# Patient Record
Sex: FEMALE | Race: WHITE | NOT HISPANIC OR LATINO | Employment: OTHER | URBAN - METROPOLITAN AREA
[De-identification: names, ages, dates, MRNs, and addresses within clinical notes are randomized per-mention and may not be internally consistent; named-entity substitution may affect disease eponyms.]

---

## 2018-08-20 ENCOUNTER — HOSPITAL ENCOUNTER (EMERGENCY)
Facility: HOSPITAL | Age: 78
Discharge: HOME | End: 2018-08-21
Attending: EMERGENCY MEDICINE | Admitting: EMERGENCY MEDICINE

## 2018-08-20 ENCOUNTER — APPOINTMENT (OUTPATIENT)
Dept: GENERAL RADIOLOGY | Facility: HOSPITAL | Age: 78
End: 2018-08-20

## 2018-08-20 DIAGNOSIS — R07.89 ATYPICAL CHEST PAIN: Primary | ICD-10-CM

## 2018-08-20 PROCEDURE — 93005 ELECTROCARDIOGRAM TRACING: CPT | Performed by: EMERGENCY MEDICINE

## 2018-08-20 PROCEDURE — 93005 ELECTROCARDIOGRAM TRACING: CPT

## 2018-08-20 PROCEDURE — 93010 ELECTROCARDIOGRAM REPORT: CPT | Performed by: INTERNAL MEDICINE

## 2018-08-20 PROCEDURE — 99284 EMERGENCY DEPT VISIT MOD MDM: CPT

## 2018-08-20 RX ORDER — SODIUM CHLORIDE 0.9 % (FLUSH) 0.9 %
10 SYRINGE (ML) INJECTION AS NEEDED
Status: DISCONTINUED | OUTPATIENT
Start: 2018-08-20 | End: 2018-08-21 | Stop reason: HOSPADM

## 2018-08-21 ENCOUNTER — APPOINTMENT (OUTPATIENT)
Dept: GENERAL RADIOLOGY | Facility: HOSPITAL | Age: 78
End: 2018-08-21

## 2018-08-21 VITALS
HEIGHT: 63 IN | HEART RATE: 65 BPM | BODY MASS INDEX: 36.41 KG/M2 | OXYGEN SATURATION: 95 % | SYSTOLIC BLOOD PRESSURE: 162 MMHG | TEMPERATURE: 98.4 F | WEIGHT: 205.5 LBS | DIASTOLIC BLOOD PRESSURE: 78 MMHG | RESPIRATION RATE: 18 BRPM

## 2018-08-21 LAB
ALBUMIN SERPL-MCNC: 5.1 G/DL (ref 3.5–5.2)
ALBUMIN/GLOB SERPL: 1.5 G/DL
ALP SERPL-CCNC: 106 U/L (ref 39–117)
ALT SERPL W P-5'-P-CCNC: 20 U/L (ref 1–33)
ANION GAP SERPL CALCULATED.3IONS-SCNC: 17.9 MMOL/L
AST SERPL-CCNC: 16 U/L (ref 1–32)
BASOPHILS # BLD AUTO: 0.01 10*3/MM3 (ref 0–0.2)
BASOPHILS NFR BLD AUTO: 0.1 % (ref 0–1.5)
BILIRUB SERPL-MCNC: 0.4 MG/DL (ref 0.1–1.2)
BUN BLD-MCNC: 23 MG/DL (ref 8–23)
BUN/CREAT SERPL: 16.1 (ref 7–25)
CALCIUM SPEC-SCNC: 10.2 MG/DL (ref 8.6–10.5)
CHLORIDE SERPL-SCNC: 104 MMOL/L (ref 98–107)
CO2 SERPL-SCNC: 20.1 MMOL/L (ref 22–29)
CREAT BLD-MCNC: 1.43 MG/DL (ref 0.57–1)
DEPRECATED RDW RBC AUTO: 46.9 FL (ref 37–54)
EOSINOPHIL # BLD AUTO: 0.1 10*3/MM3 (ref 0–0.7)
EOSINOPHIL NFR BLD AUTO: 1.1 % (ref 0.3–6.2)
ERYTHROCYTE [DISTWIDTH] IN BLOOD BY AUTOMATED COUNT: 13.8 % (ref 11.7–13)
GFR SERPL CREATININE-BSD FRML MDRD: 35 ML/MIN/1.73
GLOBULIN UR ELPH-MCNC: 3.3 GM/DL
GLUCOSE BLD-MCNC: 95 MG/DL (ref 65–99)
HCT VFR BLD AUTO: 40.1 % (ref 35.6–45.5)
HGB BLD-MCNC: 12.3 G/DL (ref 11.9–15.5)
IMM GRANULOCYTES # BLD: 0 10*3/MM3 (ref 0–0.03)
IMM GRANULOCYTES NFR BLD: 0 % (ref 0–0.5)
LYMPHOCYTES # BLD AUTO: 2.28 10*3/MM3 (ref 0.9–4.8)
LYMPHOCYTES NFR BLD AUTO: 24.9 % (ref 19.6–45.3)
MCH RBC QN AUTO: 28.7 PG (ref 26.9–32)
MCHC RBC AUTO-ENTMCNC: 30.7 G/DL (ref 32.4–36.3)
MCV RBC AUTO: 93.5 FL (ref 80.5–98.2)
MONOCYTES # BLD AUTO: 0.94 10*3/MM3 (ref 0.2–1.2)
MONOCYTES NFR BLD AUTO: 10.3 % (ref 5–12)
NEUTROPHILS # BLD AUTO: 5.84 10*3/MM3 (ref 1.9–8.1)
NEUTROPHILS NFR BLD AUTO: 63.6 % (ref 42.7–76)
PLATELET # BLD AUTO: 183 10*3/MM3 (ref 140–500)
PMV BLD AUTO: 11.7 FL (ref 6–12)
POTASSIUM BLD-SCNC: 4.7 MMOL/L (ref 3.5–5.2)
PROT SERPL-MCNC: 8.4 G/DL (ref 6–8.5)
RBC # BLD AUTO: 4.29 10*6/MM3 (ref 3.9–5.2)
SODIUM BLD-SCNC: 142 MMOL/L (ref 136–145)
TROPONIN T SERPL-MCNC: <0.01 NG/ML (ref 0–0.03)
WBC NRBC COR # BLD: 9.17 10*3/MM3 (ref 4.5–10.7)

## 2018-08-21 PROCEDURE — 85025 COMPLETE CBC W/AUTO DIFF WBC: CPT | Performed by: EMERGENCY MEDICINE

## 2018-08-21 PROCEDURE — 84484 ASSAY OF TROPONIN QUANT: CPT | Performed by: EMERGENCY MEDICINE

## 2018-08-21 PROCEDURE — 80053 COMPREHEN METABOLIC PANEL: CPT | Performed by: EMERGENCY MEDICINE

## 2018-08-21 PROCEDURE — 71046 X-RAY EXAM CHEST 2 VIEWS: CPT

## 2018-08-21 RX ORDER — ATENOLOL 100 MG/1
100 TABLET ORAL 2 TIMES DAILY
COMMUNITY

## 2018-08-21 RX ORDER — FERROUS SULFATE 325(65) MG
325 TABLET ORAL
COMMUNITY

## 2018-08-21 RX ORDER — RIVASTIGMINE TARTRATE 1.5 MG/1
1.5 CAPSULE ORAL 2 TIMES DAILY
COMMUNITY

## 2018-08-21 RX ORDER — DESLORATADINE 5 MG/1
5 TABLET, ORALLY DISINTEGRATING ORAL DAILY
COMMUNITY

## 2018-08-21 RX ORDER — AMLODIPINE BESYLATE 10 MG/1
10 TABLET ORAL DAILY
COMMUNITY

## 2018-08-21 RX ORDER — CALCIUM POLYCARBOPHIL 625 MG 625 MG/1
625 TABLET ORAL DAILY
COMMUNITY

## 2018-08-21 RX ORDER — HYDRALAZINE HYDROCHLORIDE 50 MG/1
50 TABLET, FILM COATED ORAL 2 TIMES DAILY
COMMUNITY

## 2018-08-21 RX ORDER — TRAMADOL HYDROCHLORIDE 50 MG/1
50 TABLET ORAL 2 TIMES DAILY PRN
COMMUNITY

## 2018-08-21 RX ORDER — ASPIRIN 81 MG/1
81 TABLET, CHEWABLE ORAL DAILY
COMMUNITY

## 2018-08-21 RX ORDER — TEMAZEPAM 30 MG/1
30 CAPSULE ORAL NIGHTLY PRN
COMMUNITY

## 2018-08-21 RX ORDER — MELATONIN
2000 DAILY
COMMUNITY

## 2018-08-21 NOTE — ED NOTES
"Pt report left side of chest with \"tingling down left side of arm\" that started yesterday. Pt contacted PCP MD Juancarlos and was recommended to be seen in ER.     Pt denies cardiac hx.      Mandy Herr, RN  08/20/18 3206    "

## 2018-08-21 NOTE — ED PROVIDER NOTES
EMERGENCY DEPARTMENT ENCOUNTER    CHIEF COMPLAINT  Chief Complaint: Chest pain  History given by: patient   History limited by: n/a  Room Number: 11/11  PMD: Rowan Bauer MD      HPI:  Pt is a 78 y.o. female who presents complaining of left lateral chest pain that has been intermittent since yesterday. Pt states that the episodes last about 1 minute each. Pt also reports an intermittent tingling sensation in the LUE. Pt states that the CP and the tingling in the LUE occur independently of each other. She denies any aggravating or alleviating factors. She also reports 2 episodes of dizziness today. She denies associated nausea, vomiting, SOA, or diaphoresis.     Duration:  2 day  Onset: gradual  Timing: intermittent- one minute episodes   Location: left lateral chest   Radiation: none  Quality: pain  Intensity/Severity: moderate  Progression: unchanged   Associated Symptoms: LUE tingling, dizziness  Aggravating Factors: none  Alleviating Factors: none  Previous Episodes: none    PAST MEDICAL HISTORY  Active Ambulatory Problems     Diagnosis Date Noted   • No Active Ambulatory Problems     Resolved Ambulatory Problems     Diagnosis Date Noted   • No Resolved Ambulatory Problems     Past Medical History:   Diagnosis Date   • Hypertension    • Kidney stone        PAST SURGICAL HISTORY  Past Surgical History:   Procedure Laterality Date   • ABDOMINAL SURGERY      polyp removal   • CHOLECYSTECTOMY     • HYSTERECTOMY     • TONSILLECTOMY         FAMILY HISTORY  History reviewed. No pertinent family history.    SOCIAL HISTORY  Social History     Social History   • Marital status:      Spouse name: N/A   • Number of children: N/A   • Years of education: N/A     Occupational History   • Not on file.     Social History Main Topics   • Smoking status: Never Smoker   • Smokeless tobacco: Never Used   • Alcohol use Yes      Comment: very rarely   • Drug use: No   • Sexual activity: Not on file     Other Topics  Concern   • Not on file     Social History Narrative   • No narrative on file       ALLERGIES  Patient has no known allergies.    REVIEW OF SYSTEMS  Review of Systems   Constitutional: Negative for fever.   HENT: Negative for sore throat.    Eyes: Negative.    Respiratory: Negative for cough and shortness of breath.    Cardiovascular: Positive for chest pain (left lateral).   Gastrointestinal: Negative for abdominal pain, diarrhea and vomiting.   Genitourinary: Negative for dysuria.   Musculoskeletal: Negative for neck pain.   Skin: Negative for rash.   Allergic/Immunologic: Negative.    Neurological: Positive for dizziness and numbness (LUE tingling). Negative for weakness and headaches.   Hematological: Negative.    Psychiatric/Behavioral: Negative.    All other systems reviewed and are negative.      PHYSICAL EXAM  ED Triage Vitals [08/20/18 2252]   Temp Heart Rate Resp BP SpO2   98.4 °F (36.9 °C) 68 18 -- 98 %      Temp src Heart Rate Source Patient Position BP Location FiO2 (%)   Tympanic -- -- -- --       Physical Exam   Constitutional: She is oriented to person, place, and time. No distress.   HENT:   Head: Normocephalic and atraumatic.   Eyes: Pupils are equal, round, and reactive to light. EOM are normal.   Neck: Normal range of motion. Neck supple.   Cardiovascular: Normal rate, regular rhythm and normal heart sounds.    Pulmonary/Chest: Effort normal and breath sounds normal. No respiratory distress.   Abdominal: Soft. There is no tenderness. There is no rebound and no guarding.   Musculoskeletal: Normal range of motion. She exhibits no edema.   Neurological: She is alert and oriented to person, place, and time. She has normal sensation and normal strength.   Skin: Skin is warm and dry. No rash noted.   Psychiatric: Mood and affect normal.   Nursing note and vitals reviewed.      LAB RESULTS  Lab Results (last 24 hours)     Procedure Component Value Units Date/Time    CBC & Differential [[id number]]  Collected:  08/21/18 0017    Specimen:  Blood Updated:  08/21/18 0030    Narrative:       The following orders were created for panel order CBC & Differential.  Procedure                               Abnormality         Status                     ---------                               -----------         ------                     CBC Auto Differential[[id number]]        Abnormal            Final result                 Please view results for these tests on the individual orders.    Comprehensive Metabolic Panel [[id number]]  (Abnormal) Collected:  08/21/18 0017    Specimen:  Blood Updated:  08/21/18 0043     Glucose 95 mg/dL      BUN 23 mg/dL      Creatinine 1.43 (H) mg/dL      Sodium 142 mmol/L      Potassium 4.7 mmol/L      Chloride 104 mmol/L      CO2 20.1 (L) mmol/L      Calcium 10.2 mg/dL      Total Protein 8.4 g/dL      Albumin 5.10 g/dL      ALT (SGPT) 20 U/L      AST (SGOT) 16 U/L      Alkaline Phosphatase 106 U/L      Total Bilirubin 0.4 mg/dL      eGFR Non African Amer 35 (L) mL/min/1.73      Globulin 3.3 gm/dL      A/G Ratio 1.5 g/dL      BUN/Creatinine Ratio 16.1     Anion Gap 17.9 mmol/L     Narrative:       The MDRD GFR formula is only valid for adults with stable renal function between ages 18 and 70.    Troponin [[id number]]  (Normal) Collected:  08/21/18 0017    Specimen:  Blood Updated:  08/21/18 0043     Troponin T <0.010 ng/mL     Narrative:       Troponin T Reference Ranges:  Less than 0.03 ng/mL:    Negative for AMI  0.03 to 0.09 ng/mL:      Indeterminant for AMI  Greater than 0.09 ng/mL: Positive for AMI    CBC Auto Differential [[id number]]  (Abnormal) Collected:  08/21/18 0017    Specimen:  Blood Updated:  08/21/18 0030     WBC 9.17 10*3/mm3      RBC 4.29 10*6/mm3      Hemoglobin 12.3 g/dL      Hematocrit 40.1 %      MCV 93.5 fL      MCH 28.7 pg      MCHC 30.7 (L) g/dL      RDW 13.8 (H) %      RDW-SD 46.9 fl      MPV 11.7 fL      Platelets 183 10*3/mm3      Neutrophil % 63.6 %      Lymphocyte  % 24.9 %      Monocyte % 10.3 %      Eosinophil % 1.1 %      Basophil % 0.1 %      Immature Grans % 0.0 %      Neutrophils, Absolute 5.84 10*3/mm3      Lymphocytes, Absolute 2.28 10*3/mm3      Monocytes, Absolute 0.94 10*3/mm3      Eosinophils, Absolute 0.10 10*3/mm3      Basophils, Absolute 0.01 10*3/mm3      Immature Grans, Absolute 0.00 10*3/mm3           I ordered the above labs and reviewed the results    RADIOLOGY  XR Chest 2 View   Final Result   Minimal left lung base atelectasis       This report was finalized on 8/21/2018 12:27 AM by James Cooper M.D.               I ordered the above noted radiological studies. Interpreted by radiologist. Reviewed by me in PACS.       PROCEDURES  Procedures    EKG           EKG time: 22:58  Rhythm/Rate: NSR 66  P waves and IA: nml  QRS, axis: nml   ST and T waves: nml     Interpreted Contemporaneously by me, independently viewed  No prior      PROGRESS AND CONSULTS        23;38  CXR, EKG, CMP, CBC, and troponin ordered.    23:47  BP-   HR- 68 Temp- 98.4 °F (36.9 °C) (Tympanic) O2 sat- 98%  Advised pt of the plan for labs, CXR, and EKG. Pt understands and agrees with the plan, all questions answered.    01:35  BP- 162/78 HR- 65 Temp- 98.4 °F (36.9 °C) (Tympanic) O2 sat- 95%  Rechecked the patient who is in NAD and is resting comfortably. Advised pt that the workup in the ED shows NAD, including a negative CXR, EKG, and troponin. Informed her of the plan for discharge and pt will need to f/u with her PMD. Pt understands and agrees with the plan, all questions answered.    MEDICAL DECISION MAKING  Results were reviewed/discussed with the patient and they were also made aware of online access. Pt also made aware that some labs, such as cultures, will not be resulted during ER visit and follow up with PMD is necessary.     MDM  Number of Diagnoses or Management Options     Amount and/or Complexity of Data Reviewed  Clinical lab tests: ordered and reviewed (Troponin is  <0.010)  Tests in the radiology section of CPT®: ordered and reviewed (CXR shows left lung base atelectasis )  Tests in the medicine section of CPT®: ordered and reviewed (See EKG procedure note. )  Decide to obtain previous medical records or to obtain history from someone other than the patient: yes  Review and summarize past medical records: yes (No prior ED visits. )    Patient Progress  Patient progress: stable         DIAGNOSIS  Final diagnoses:   Atypical chest pain       DISPOSITION  DISCHARGE    Patient discharged in stable condition.    Reviewed implications of results, diagnosis, meds, responsibility to follow up, warning signs and symptoms of possible worsening, potential complications and reasons to return to ER.    Patient/Family voiced understanding of above instructions.    Discussed plan for discharge, as there is no emergent indication for admission. Patient referred to primary care provider for BP management due to today's BP. Pt/family is agreeable and understands need for follow up and repeat testing.  Pt is aware that discharge does not mean that nothing is wrong but it indicates no emergency is present that requires admission and they must continue care with follow-up as given below or physician of their choice.     FOLLOW-UP  Rowan Bauer MD  [address] [place] [address]  [phone]    Schedule an appointment as soon as possible for a visit            Medication List      No changes were made to your prescriptions during this visit.       Latest Documented Vital Signs:  As of 6:07 AM  BP- 162/78 HR- 65 Temp- 98.4 °F (36.9 °C) (Tympanic) O2 sat- 95%    --  Documentation assistance provided by claudio Avila for Dr Shearer.  Information recorded by the scrjen was done at my direction and has been verified and validated by me.        Ghazal Avila  08/21/18 0138       Sudeep Shearer MD  08/21/18 7634

## 2019-03-02 ENCOUNTER — HOSPITAL ENCOUNTER (EMERGENCY)
Facility: HOSPITAL | Age: 79
Discharge: HOME | End: 2019-03-02
Attending: EMERGENCY MEDICINE | Admitting: EMERGENCY MEDICINE

## 2019-03-02 ENCOUNTER — APPOINTMENT (OUTPATIENT)
Dept: GENERAL RADIOLOGY | Facility: HOSPITAL | Age: 79
End: 2019-03-02

## 2019-03-02 VITALS
OXYGEN SATURATION: 99 % | HEART RATE: 70 BPM | BODY MASS INDEX: 37.21 KG/M2 | WEIGHT: 210 LBS | RESPIRATION RATE: 16 BRPM | DIASTOLIC BLOOD PRESSURE: 66 MMHG | TEMPERATURE: 98.2 F | SYSTOLIC BLOOD PRESSURE: 98 MMHG | HEIGHT: 63 IN

## 2019-03-02 DIAGNOSIS — M21.612 BUNION OF GREAT TOE OF LEFT FOOT: Primary | ICD-10-CM

## 2019-03-02 PROCEDURE — 73630 X-RAY EXAM OF FOOT: CPT

## 2019-03-02 PROCEDURE — 99283 EMERGENCY DEPT VISIT LOW MDM: CPT

## 2019-03-02 RX ORDER — CEPHALEXIN 500 MG/1
500 CAPSULE ORAL 3 TIMES DAILY
Qty: 15 CAPSULE | Refills: 0 | Status: SHIPPED | OUTPATIENT
Start: 2019-03-02 | End: 2019-03-07

## 2019-03-02 NOTE — ED PROVIDER NOTES
EMERGENCY DEPARTMENT ENCOUNTER    CHIEF COMPLAINT  Chief Complaint: left foot pain  History given by: patient  History limited by: nothing  Room Number: 07/07  PMD: Rowan Bauer MD      HPI:  Pt is a 78 y.o. female who presents complaining of left foot pain that began yesterday. Pt denies any fever or any newly prescribed medications.  The pain is located in her bunion it her left foot.  There was no known direct trauma.  Pt reports that she has a Podiatrist but has not been to recently. Pt denies a family hx of gout.the patient has never had gout in the past as well.  There are no other symptoms at this time.    Duration:  yesterday  Onset: gradual  Timing: constant  Location: left foot  Radiation: none  Quality: pain  Intensity/Severity: moderate  Progression: unchanged  Associated Symptoms: none  Aggravating Factors: none  Alleviating Factors: none  Previous Episodes: none  Treatment before arrival: none    PAST MEDICAL HISTORY  Active Ambulatory Problems     Diagnosis Date Noted   • No Active Ambulatory Problems     Resolved Ambulatory Problems     Diagnosis Date Noted   • No Resolved Ambulatory Problems     Past Medical History:   Diagnosis Date   • Hypertension    • Kidney stone        PAST SURGICAL HISTORY  Past Surgical History:   Procedure Laterality Date   • ABDOMINAL SURGERY      polyp removal   • CHOLECYSTECTOMY     • HYSTERECTOMY     • TONSILLECTOMY         FAMILY HISTORY  No family history on file.    SOCIAL HISTORY  Social History     Socioeconomic History   • Marital status:      Spouse name: Not on file   • Number of children: Not on file   • Years of education: Not on file   • Highest education level: Not on file   Social Needs   • Financial resource strain: Not on file   • Food insecurity - worry: Not on file   • Food insecurity - inability: Not on file   • Transportation needs - medical: Not on file   • Transportation needs - non-medical: Not on file   Occupational History   •  Not on file   Tobacco Use   • Smoking status: Never Smoker   • Smokeless tobacco: Never Used   Substance and Sexual Activity   • Alcohol use: Yes     Comment: very rarely   • Drug use: No   • Sexual activity: Not on file   Other Topics Concern   • Not on file   Social History Narrative   • Not on file       ALLERGIES  Patient has no known allergies.    REVIEW OF SYSTEMS  Review of Systems   Constitutional: Negative for fever.   HENT: Negative for sore throat.    Eyes: Negative.    Respiratory: Negative for cough and shortness of breath.    Cardiovascular: Negative for chest pain.   Gastrointestinal: Negative for abdominal pain, diarrhea and vomiting.   Genitourinary: Negative for dysuria.   Musculoskeletal: Positive for myalgias (foot pain). Negative for neck pain.   Skin: Negative for rash.   Neurological: Negative for weakness, numbness and headaches.   Hematological: Negative.    Psychiatric/Behavioral: Negative.    All other systems reviewed and are negative.      PHYSICAL EXAM  ED Triage Vitals   Temp Heart Rate Resp BP SpO2   03/02/19 1000 03/02/19 0927 03/02/19 0927 03/02/19 1000 03/02/19 0927   98.2 °F (36.8 °C) 70 18 110/71 97 %      Temp src Heart Rate Source Patient Position BP Location FiO2 (%)   03/02/19 1000 -- -- -- --   Oral           Physical Exam   Constitutional: She is oriented to person, place, and time. No distress.   HENT:   Head: Normocephalic and atraumatic.   Eyes: EOM are normal. Pupils are equal, round, and reactive to light.   Neck: Normal range of motion. Neck supple.   Cardiovascular: Normal rate, regular rhythm and normal heart sounds.   Pulmonary/Chest: Effort normal and breath sounds normal. No respiratory distress.   Abdominal: Soft. There is no tenderness. There is no rebound and no guarding.   Musculoskeletal: Normal range of motion. She exhibits no edema.   Very large bunions bilaterally. Mild erythema to the left foot located on the medial aspect of bunion on left foot.  The  area of erythema is approximately an inch and a half in diameter.  There is no signs of abscess.. Severe onychomycosis and onycholysis.    Neurological: She is alert and oriented to person, place, and time. She has normal sensation and normal strength.   Skin: Skin is warm and dry. No rash noted.   Psychiatric: Mood and affect normal.   Nursing note and vitals reviewed.      LAB RESULTS  Lab Results (last 24 hours)     ** No results found for the last 24 hours. **          I ordered the above labs and reviewed the results    RADIOLOGY  XR Foot 3+ View Left   Final Result       No acute fracture or erosion is identified. Chronic and degenerative   changes.       This report was finalized on 3/2/2019 10:35 AM by Dr. Otf Cardenas M.D.               I ordered the above noted radiological studies. Interpreted by radiologist. Discussed with radiologist (). Reviewed by me in PACS.       PROCEDURES  Procedures      PROGRESS AND CONSULTS     1013 Ordered XR Foot for further evaluation.    1043 Rechecked the patient who is resting comfortably and in NAD. Patient is stable. BP- 110/71 HR- 70 Temp- 98.2 °F (36.8 °C) (Oral) O2 sat- 97%. Informed the patient of foot XR which shows nothing acute. Discussed the plan for discharge with a prescription for antibiotics and instructions to f/u with Podiatrist for further evaluation and management. Strict RTER warnings given. Pt understands and agrees with the plan, all questions answered.    3 possible etiology of the patient's symptoms.  I think the most likely etiology is this is inflammation from her bunion is very large to her left foot.  There is localized erythema.  The patient does not have a break in skin and there is no obvious definitive cellulitis seen.  I will start the patient on Keflex for a few days I gave her strict warnings to return the emergency department if erythema progresses up her foot or she develops any fever or worsening pain.  The third possibility is  the possibility of gout.  The patient has never had gout in the past and there is no family history of gout.  The pain is seems to be local to the medial aspect on the skin located over the bunion rather than in the joint of the first MTP.  The patient does have chronic renal failure and is not able to take nonsteroidal medicine.  I instructed the patient take Tylenol and I did give the patient low-dose Lortab to take for breakthrough pain.  She agrees with this plan and will follow up with her primary doctor as scheduled in 2 days on Monday.    MEDICAL DECISION MAKING  Results were reviewed/discussed with the patient and they were also made aware of online access. Pt also made aware that some labs, such as cultures, will not be resulted during ER visit and follow up with PMD is necessary.     MDM  Number of Diagnoses or Management Options  Bunion of great toe of left foot: inflamation:      Amount and/or Complexity of Data Reviewed  Tests in the radiology section of CPT®: reviewed and ordered (XR Foot-No acute fracture or erosion is identified. Chronic and degenerative  changes.)           DIAGNOSIS  Final diagnoses:   Bunion of great toe of left foot: inflamation       DISPOSITION  DISCHARGE    Patient discharged in stable condition.    Reviewed implications of results, diagnosis, meds, responsibility to follow up, warning signs and symptoms of possible worsening, potential complications and reasons to return to ER.    Patient/Family voiced understanding of above instructions.    Discussed plan for discharge, as there is no emergent indication for admission. Patient referred to primary care provider for BP management due to today's BP. Pt/family is agreeable and understands need for follow up and repeat testing.  Pt is aware that discharge does not mean that nothing is wrong but it indicates no emergency is present that requires admission and they must continue care with follow-up as given below or physician of  their choice.     FOLLOW-UP  Rowan Bauer MD  [address] [address]  [phone]      Follow up with your doctor as scheduled in 2 days ( monday)., Return if pain worsens, If symptoms worsen, shortness of breath, fever, any concerns         Medication List      New Prescriptions    cephalexin 500 MG capsule  Commonly known as:  KEFLEX  Take 1 capsule by mouth 3 (Three) Times a Day for 5 days.              Latest Documented Vital Signs:  As of 2:17 PM  BP- 98/66 HR- 70 Temp- 98.2 °F (36.8 °C) (Oral) O2 sat- 99%    --  Documentation assistance provided by claudio Ryan for Dr. Castano.  Information recorded by the scribe was done at my direction and has been verified and validated by me.                        Forest Ryan  03/02/19 5110       Juarez Castano MD  03/02/19 1580

## 2019-03-02 NOTE — ED NOTES
Pt has left bunion pain. Pt reports the pain started yesterday. Pt states she has not worn any new shoes or any injury to the area     Olivia Villalba RN  03/02/19 1007

## 2022-01-18 ENCOUNTER — TRANSCRIBE ORDERS (OUTPATIENT)
Dept: ADMINISTRATIVE | Facility: HOSPITAL | Age: 82
End: 2022-01-18

## 2022-01-18 DIAGNOSIS — Z78.0 MENOPAUSE: Primary | ICD-10-CM

## 2022-01-18 DIAGNOSIS — Z12.31 VISIT FOR SCREENING MAMMOGRAM: ICD-10-CM

## 2022-07-21 ENCOUNTER — APPOINTMENT (OUTPATIENT)
Dept: BONE DENSITY | Facility: HOSPITAL | Age: 82
End: 2022-07-21

## 2024-09-30 ENCOUNTER — APPOINTMENT (OUTPATIENT)
Dept: CT IMAGING | Facility: HOSPITAL | Age: 84
End: 2024-09-30
Payer: MEDICARE

## 2024-09-30 ENCOUNTER — APPOINTMENT (OUTPATIENT)
Dept: ULTRASOUND IMAGING | Facility: HOSPITAL | Age: 84
End: 2024-09-30
Payer: MEDICARE

## 2024-09-30 ENCOUNTER — HOSPITAL ENCOUNTER (INPATIENT)
Facility: HOSPITAL | Age: 84
LOS: 8 days | Discharge: HOME | End: 2024-10-09
Attending: EMERGENCY MEDICINE | Admitting: HOSPITALIST
Payer: MEDICARE

## 2024-09-30 DIAGNOSIS — R10.9 LEFT FLANK PAIN: Primary | ICD-10-CM

## 2024-09-30 DIAGNOSIS — N17.9 AKI (ACUTE KIDNEY INJURY): ICD-10-CM

## 2024-09-30 DIAGNOSIS — N17.9 ACUTE RENAL FAILURE, UNSPECIFIED ACUTE RENAL FAILURE TYPE: ICD-10-CM

## 2024-09-30 DIAGNOSIS — D72.829 LEUKOCYTOSIS, UNSPECIFIED TYPE: ICD-10-CM

## 2024-09-30 DIAGNOSIS — R93.5 ABNORMAL CT OF THE ABDOMEN: ICD-10-CM

## 2024-09-30 LAB
ALBUMIN SERPL-MCNC: 3.6 G/DL (ref 3.5–5.2)
ALBUMIN/GLOB SERPL: 0.9 G/DL
ALP SERPL-CCNC: 159 U/L (ref 39–117)
ALT SERPL W P-5'-P-CCNC: 15 U/L (ref 1–33)
ANION GAP SERPL CALCULATED.3IONS-SCNC: 17 MMOL/L (ref 5–15)
AST SERPL-CCNC: 22 U/L (ref 1–32)
BACTERIA UR QL AUTO: ABNORMAL /HPF
BASOPHILS # BLD AUTO: 0.03 10*3/MM3 (ref 0–0.2)
BASOPHILS NFR BLD AUTO: 0.2 % (ref 0–1.5)
BILIRUB SERPL-MCNC: 0.5 MG/DL (ref 0–1.2)
BILIRUB UR QL STRIP: NEGATIVE
BUN SERPL-MCNC: 53 MG/DL (ref 8–23)
BUN/CREAT SERPL: 17.8 (ref 7–25)
CALCIUM SPEC-SCNC: 9.8 MG/DL (ref 8.6–10.5)
CHLORIDE SERPL-SCNC: 101 MMOL/L (ref 98–107)
CLARITY UR: ABNORMAL
CO2 SERPL-SCNC: 17 MMOL/L (ref 22–29)
COLOR UR: ABNORMAL
CREAT SERPL-MCNC: 2.97 MG/DL (ref 0.57–1)
CREAT UR-MCNC: 133.6 MG/DL
D-LACTATE SERPL-SCNC: 1.5 MMOL/L (ref 0.5–2)
DEPRECATED RDW RBC AUTO: 41 FL (ref 37–54)
EGFRCR SERPLBLD CKD-EPI 2021: 14.9 ML/MIN/1.73
EOSINOPHIL # BLD AUTO: 0.03 10*3/MM3 (ref 0–0.4)
EOSINOPHIL NFR BLD AUTO: 0.2 % (ref 0.3–6.2)
ERYTHROCYTE [DISTWIDTH] IN BLOOD BY AUTOMATED COUNT: 12.6 % (ref 12.3–15.4)
GLOBULIN UR ELPH-MCNC: 3.8 GM/DL
GLUCOSE SERPL-MCNC: 109 MG/DL (ref 65–99)
GLUCOSE UR STRIP-MCNC: NEGATIVE MG/DL
HCT VFR BLD AUTO: 31.9 % (ref 34–46.6)
HGB BLD-MCNC: 10.6 G/DL (ref 12–15.9)
HGB UR QL STRIP.AUTO: ABNORMAL
HYALINE CASTS UR QL AUTO: ABNORMAL /LPF
IMM GRANULOCYTES # BLD AUTO: 0.21 10*3/MM3 (ref 0–0.05)
IMM GRANULOCYTES NFR BLD AUTO: 1.2 % (ref 0–0.5)
KETONES UR QL STRIP: ABNORMAL
LEUKOCYTE ESTERASE UR QL STRIP.AUTO: ABNORMAL
LIPASE SERPL-CCNC: 14 U/L (ref 13–60)
LYMPHOCYTES # BLD AUTO: 0.93 10*3/MM3 (ref 0.7–3.1)
LYMPHOCYTES NFR BLD AUTO: 5.4 % (ref 19.6–45.3)
MCH RBC QN AUTO: 30.2 PG (ref 26.6–33)
MCHC RBC AUTO-ENTMCNC: 33.2 G/DL (ref 31.5–35.7)
MCV RBC AUTO: 90.9 FL (ref 79–97)
MONOCYTES # BLD AUTO: 1.62 10*3/MM3 (ref 0.1–0.9)
MONOCYTES NFR BLD AUTO: 9.5 % (ref 5–12)
NEUTROPHILS NFR BLD AUTO: 14.3 10*3/MM3 (ref 1.7–7)
NEUTROPHILS NFR BLD AUTO: 83.5 % (ref 42.7–76)
NITRITE UR QL STRIP: POSITIVE
NRBC BLD AUTO-RTO: 0 /100 WBC (ref 0–0.2)
PH UR STRIP.AUTO: 5.5 [PH] (ref 5–8)
PLATELET # BLD AUTO: 267 10*3/MM3 (ref 140–450)
PMV BLD AUTO: 11.2 FL (ref 6–12)
POTASSIUM SERPL-SCNC: 4 MMOL/L (ref 3.5–5.2)
PROCALCITONIN SERPL-MCNC: 1.19 NG/ML (ref 0–0.25)
PROT SERPL-MCNC: 7.4 G/DL (ref 6–8.5)
PROT UR QL STRIP: ABNORMAL
RBC # BLD AUTO: 3.51 10*6/MM3 (ref 3.77–5.28)
RBC # UR STRIP: ABNORMAL /HPF
REF LAB TEST METHOD: ABNORMAL
SODIUM SERPL-SCNC: 135 MMOL/L (ref 136–145)
SODIUM UR-SCNC: 35 MMOL/L
SP GR UR STRIP: 1.01 (ref 1–1.03)
SQUAMOUS #/AREA URNS HPF: ABNORMAL /HPF
UROBILINOGEN UR QL STRIP: ABNORMAL
WBC # UR STRIP: ABNORMAL /HPF
WBC NRBC COR # BLD AUTO: 17.12 10*3/MM3 (ref 3.4–10.8)

## 2024-09-30 PROCEDURE — 87088 URINE BACTERIA CULTURE: CPT | Performed by: HOSPITALIST

## 2024-09-30 PROCEDURE — 93005 ELECTROCARDIOGRAM TRACING: CPT | Performed by: HOSPITALIST

## 2024-09-30 PROCEDURE — 83690 ASSAY OF LIPASE: CPT | Performed by: EMERGENCY MEDICINE

## 2024-09-30 PROCEDURE — 93010 ELECTROCARDIOGRAM REPORT: CPT | Performed by: INTERNAL MEDICINE

## 2024-09-30 PROCEDURE — G0378 HOSPITAL OBSERVATION PER HR: HCPCS

## 2024-09-30 PROCEDURE — 36415 COLL VENOUS BLD VENIPUNCTURE: CPT

## 2024-09-30 PROCEDURE — 25010000002 CEFTRIAXONE PER 250 MG: Performed by: EMERGENCY MEDICINE

## 2024-09-30 PROCEDURE — 87086 URINE CULTURE/COLONY COUNT: CPT | Performed by: HOSPITALIST

## 2024-09-30 PROCEDURE — 74176 CT ABD & PELVIS W/O CONTRAST: CPT

## 2024-09-30 PROCEDURE — 81001 URINALYSIS AUTO W/SCOPE: CPT | Performed by: EMERGENCY MEDICINE

## 2024-09-30 PROCEDURE — 84300 ASSAY OF URINE SODIUM: CPT | Performed by: INTERNAL MEDICINE

## 2024-09-30 PROCEDURE — 36415 COLL VENOUS BLD VENIPUNCTURE: CPT | Performed by: EMERGENCY MEDICINE

## 2024-09-30 PROCEDURE — 25810000003 SODIUM CHLORIDE 0.9 % SOLUTION: Performed by: HOSPITALIST

## 2024-09-30 PROCEDURE — 83605 ASSAY OF LACTIC ACID: CPT | Performed by: EMERGENCY MEDICINE

## 2024-09-30 PROCEDURE — 25810000003 SODIUM CHLORIDE 0.9 % SOLUTION: Performed by: EMERGENCY MEDICINE

## 2024-09-30 PROCEDURE — 76775 US EXAM ABDO BACK WALL LIM: CPT

## 2024-09-30 PROCEDURE — 82570 ASSAY OF URINE CREATININE: CPT | Performed by: INTERNAL MEDICINE

## 2024-09-30 PROCEDURE — 87040 BLOOD CULTURE FOR BACTERIA: CPT | Performed by: EMERGENCY MEDICINE

## 2024-09-30 PROCEDURE — 99285 EMERGENCY DEPT VISIT HI MDM: CPT

## 2024-09-30 PROCEDURE — 83880 ASSAY OF NATRIURETIC PEPTIDE: CPT | Performed by: HOSPITALIST

## 2024-09-30 PROCEDURE — 80053 COMPREHEN METABOLIC PANEL: CPT | Performed by: EMERGENCY MEDICINE

## 2024-09-30 PROCEDURE — 87186 SC STD MICRODIL/AGAR DIL: CPT | Performed by: HOSPITALIST

## 2024-09-30 PROCEDURE — 85025 COMPLETE CBC W/AUTO DIFF WBC: CPT | Performed by: EMERGENCY MEDICINE

## 2024-09-30 PROCEDURE — 84145 PROCALCITONIN (PCT): CPT | Performed by: EMERGENCY MEDICINE

## 2024-09-30 RX ORDER — SODIUM BICARBONATE 650 MG/1
650 TABLET ORAL 3 TIMES DAILY
Status: DISCONTINUED | OUTPATIENT
Start: 2024-09-30 | End: 2024-10-01

## 2024-09-30 RX ORDER — IRBESARTAN 300 MG/1
300 TABLET ORAL NIGHTLY
COMMUNITY
End: 2024-10-09 | Stop reason: HOSPADM

## 2024-09-30 RX ORDER — TEMAZEPAM 7.5 MG/1
30 CAPSULE ORAL NIGHTLY PRN
Status: DISCONTINUED | OUTPATIENT
Start: 2024-09-30 | End: 2024-10-09

## 2024-09-30 RX ORDER — AMLODIPINE BESYLATE 10 MG/1
10 TABLET ORAL NIGHTLY
Status: DISCONTINUED | OUTPATIENT
Start: 2024-09-30 | End: 2024-10-02

## 2024-09-30 RX ORDER — ASPIRIN 81 MG/1
81 TABLET ORAL NIGHTLY
Status: DISCONTINUED | OUTPATIENT
Start: 2024-09-30 | End: 2024-10-09 | Stop reason: HOSPADM

## 2024-09-30 RX ORDER — LOSARTAN POTASSIUM 50 MG/1
100 TABLET ORAL
Status: DISCONTINUED | OUTPATIENT
Start: 2024-09-30 | End: 2024-09-30

## 2024-09-30 RX ORDER — RIVASTIGMINE TARTRATE 1.5 MG/1
6 CAPSULE ORAL 2 TIMES DAILY WITH MEALS
Status: DISCONTINUED | OUTPATIENT
Start: 2024-09-30 | End: 2024-10-09 | Stop reason: HOSPADM

## 2024-09-30 RX ORDER — SODIUM CHLORIDE 9 MG/ML
75 INJECTION, SOLUTION INTRAVENOUS CONTINUOUS
Status: DISCONTINUED | OUTPATIENT
Start: 2024-09-30 | End: 2024-10-01

## 2024-09-30 RX ORDER — SODIUM CHLORIDE 0.9 % (FLUSH) 0.9 %
10 SYRINGE (ML) INJECTION AS NEEDED
Status: DISCONTINUED | OUTPATIENT
Start: 2024-09-30 | End: 2024-10-09 | Stop reason: HOSPADM

## 2024-09-30 RX ORDER — MEMANTINE HYDROCHLORIDE 10 MG/1
10 TABLET ORAL EVERY 12 HOURS SCHEDULED
Status: DISCONTINUED | OUTPATIENT
Start: 2024-09-30 | End: 2024-10-09 | Stop reason: HOSPADM

## 2024-09-30 RX ORDER — CALCIUM POLYCARBOPHIL 625 MG
625 TABLET ORAL DAILY
Status: DISCONTINUED | OUTPATIENT
Start: 2024-10-01 | End: 2024-10-09 | Stop reason: HOSPADM

## 2024-09-30 RX ORDER — MEMANTINE HYDROCHLORIDE 10 MG/1
10 TABLET ORAL 2 TIMES DAILY
COMMUNITY

## 2024-09-30 RX ORDER — FERROUS SULFATE 325(65) MG
325 TABLET ORAL NIGHTLY
Status: DISCONTINUED | OUTPATIENT
Start: 2024-09-30 | End: 2024-09-30

## 2024-09-30 RX ORDER — FAMOTIDINE 20 MG/1
20 TABLET, FILM COATED ORAL 2 TIMES DAILY
Status: DISCONTINUED | OUTPATIENT
Start: 2024-09-30 | End: 2024-10-09 | Stop reason: HOSPADM

## 2024-09-30 RX ADMIN — CEFTRIAXONE 2000 MG: 2 INJECTION, POWDER, FOR SOLUTION INTRAMUSCULAR; INTRAVENOUS at 16:48

## 2024-09-30 RX ADMIN — SODIUM CHLORIDE 1000 ML: 9 INJECTION, SOLUTION INTRAVENOUS at 15:56

## 2024-09-30 RX ADMIN — SODIUM BICARBONATE 650 MG: 650 TABLET ORAL at 21:10

## 2024-09-30 RX ADMIN — METOPROLOL TARTRATE 5 MG: 1 INJECTION, SOLUTION INTRAVENOUS at 21:11

## 2024-09-30 RX ADMIN — MEMANTINE HYDROCHLORIDE 10 MG: 10 TABLET, FILM COATED ORAL at 21:11

## 2024-09-30 RX ADMIN — RIVASTIGMINE TARTRATE 6 MG: 1.5 CAPSULE ORAL at 21:11

## 2024-09-30 RX ADMIN — FAMOTIDINE 20 MG: 20 TABLET, FILM COATED ORAL at 21:11

## 2024-09-30 RX ADMIN — SODIUM CHLORIDE 75 ML/HR: 9 INJECTION, SOLUTION INTRAVENOUS at 19:11

## 2024-09-30 NOTE — H&P
History and physical    Primary care physician  Dr. Bauer    Chief complaint  Left flank pain    History of present illness  84-year-old female with history of hypertension and dementia who lives by herself presented to [facility] emergency room with left flank pain for last few days which is getting worse.  Patient also complained of nausea but no vomiting diarrhea.  Patient also denies any fever chills chest pain shortness of breath palpitation.  Patient workup in ER revealed    PAST MEDICAL HISTORY   Hypertension      Dementia  Osteoarthritis        PAST SURGICAL HISTORY              Procedure Laterality Date    ABDOMINAL SURGERY         polyp removal    CHOLECYSTECTOMY        HYSTERECTOMY        TONSILLECTOMY             FAMILY HISTORY  No family history on file.     SOCIAL HISTORY                 Socioeconomic History    Marital status:    Tobacco Use    Smoking status: Never    Smokeless tobacco: Never   Substance and Sexual Activity    Alcohol use: Yes       Comment: very rarely    Drug use: No         ALLERGIES  Patient has no known allergies.  Home medications reviewed     REVIEW OF SYSTEMS  All systems reviewed and negative except for those discussed in HPI.     PHYSICAL EXAM   Blood pressure 152/93, pulse 90, temperature 96.4 °F (35.8 °C), temperature source Tympanic, resp. rate 16, SpO2 97%.    GENERAL: Alert well-appearing female no obvious distress.   SKIN: Warm, dry  HENT: Normocephalic, atraumatic  EYES: no scleral icterus  CV: regular rhythm, regular rate  RESPIRATORY: normal effort, moving air bilaterally  ABDOMEN: soft, nontender, nondistended bowel sounds positive  MUSCULOSKELETAL: no deformity  NEURO: alert, moves all extremities, follows commands     LAB RESULTS  Lab Results (last 24 hours)       Procedure Component Value Units Date/Time    Lactic Acid, Plasma [[id number]]  (Normal) Collected: 09/30/24 1631    Specimen: Blood Updated: 09/30/24 1711     Lactate 1.5 mmol/L   "   Urinalysis, Microscopic Only - Straight Cath [[id number]]  (Abnormal) Collected: 09/30/24 1610    Specimen: Urine from Straight Cath Updated: 09/30/24 1641     RBC, UA 0-2 /HPF      WBC, UA Too Numerous to Count /HPF      Bacteria, UA 4+ /HPF      Squamous Epithelial Cells, UA 3-6 /HPF      Hyaline Casts, UA 13-20 /LPF      Methodology Automated Microscopy    Urinalysis With Microscopic If Indicated (No Culture) - Straight Cath [[id number]]  (Abnormal) Collected: 09/30/24 1610    Specimen: Urine from Straight Cath Updated: 09/30/24 1639     Color, UA Dark Yellow     Appearance, UA Turbid     pH, UA 5.5     Specific Gravity, UA 1.015     Glucose, UA Negative     Ketones, UA Trace     Bilirubin, UA Negative     Blood, UA Small (1+)     Protein, UA 30 mg/dL (1+)     Leuk Esterase, UA Large (3+)     Nitrite, UA Positive     Urobilinogen, UA 0.2 E.U./dL    Blood Culture - Blood, Arm, Left [[id number]] Collected: 09/30/24 1622    Specimen: Blood from Arm, Left Updated: 09/30/24 1639    Blood Culture - Blood, Arm, Right [[id number]] Collected: 09/30/24 1631    Specimen: Blood from Arm, Right Updated: 09/30/24 1639    Procalcitonin [[id number]]  (Abnormal) Collected: 09/30/24 1408    Specimen: Blood Updated: 09/30/24 1636     Procalcitonin 1.19 ng/mL     Narrative:      As a Marker for Sepsis (Non-Neonates):    1. <0.5 ng/mL represents a low risk of severe sepsis and/or septic shock.  2. >2 ng/mL represents a high risk of severe sepsis and/or septic shock.    As a Marker for Lower Respiratory Tract Infections that require antibiotic therapy:    PCT on Admission    Antibiotic Therapy       6-12 Hrs later    >0.5                Strongly Recommended  >0.25 - <0.5        Recommended   0.1 - 0.25          Discouraged              Remeasure/reassess PCT  <0.1                Strongly Discouraged     Remeasure/reassess PCT    As 28 day mortality risk marker: \"Change in Procalcitonin Result\" (>80% or <=80%) if Day 0 (or Day 1) and Day " 4 values are available. Refer to [email or web address]    Change in PCT <=80%  A decrease of PCT levels below or equal to 80% defines a positive change in PCT test result representing a higher risk for 28-day all-cause mortality of patients diagnosed with severe sepsis for septic shock.    Change in PCT >80%  A decrease of PCT levels of more than 80% defines a negative change in PCT result representing a lower risk for 28-day all-cause mortality of patients diagnosed with severe sepsis or septic shock.       Comprehensive Metabolic Panel [[id number]]  (Abnormal) Collected: 09/30/24 1408    Specimen: Blood Updated: 09/30/24 1445     Glucose 109 mg/dL      BUN 53 mg/dL      Creatinine 2.97 mg/dL      Sodium 135 mmol/L      Potassium 4.0 mmol/L      Comment: Slight hemolysis detected by analyzer. Result may be falsely elevated.        Chloride 101 mmol/L      CO2 17.0 mmol/L      Calcium 9.8 mg/dL      Total Protein 7.4 g/dL      Albumin 3.6 g/dL      ALT (SGPT) 15 U/L      AST (SGOT) 22 U/L      Comment: Slight hemolysis detected by analyzer. Result may be falsely elevated.        Alkaline Phosphatase 159 U/L      Total Bilirubin 0.5 mg/dL      Globulin 3.8 gm/dL      A/G Ratio 0.9 g/dL      BUN/Creatinine Ratio 17.8     Anion Gap 17.0 mmol/L      eGFR 14.9 mL/min/1.73      Comment: <15 Indicative of kidney failure       Narrative:      GFR Normal >60  Chronic Kidney Disease <60  Kidney Failure <15    The GFR formula is only valid for adults with stable renal function between ages 18 and 70.    Lipase [[id number]]  (Normal) Collected: 09/30/24 1408    Specimen: Blood Updated: 09/30/24 1444     Lipase 14 U/L     CBC & Differential [[id number]]  (Abnormal) Collected: 09/30/24 1408    Specimen: Blood Updated: 09/30/24 1420    Narrative:      The following orders were created for panel order CBC & Differential.  Procedure                               Abnormality         Status                     ---------                                -----------         ------                     CBC Auto Differential[[id number]]        Abnormal            Final result                 Please view results for these tests on the individual orders.    CBC Auto Differential [[id number]]  (Abnormal) Collected: 09/30/24 1408    Specimen: Blood Updated: 09/30/24 1420     WBC 17.12 10*3/mm3      RBC 3.51 10*6/mm3      Hemoglobin 10.6 g/dL      Hematocrit 31.9 %      MCV 90.9 fL      MCH 30.2 pg      MCHC 33.2 g/dL      RDW 12.6 %      RDW-SD 41.0 fl      MPV 11.2 fL      Platelets 267 10*3/mm3      Neutrophil % 83.5 %      Lymphocyte % 5.4 %      Monocyte % 9.5 %      Eosinophil % 0.2 %      Basophil % 0.2 %      Immature Grans % 1.2 %      Neutrophils, Absolute 14.30 10*3/mm3      Lymphocytes, Absolute 0.93 10*3/mm3      Monocytes, Absolute 1.62 10*3/mm3      Eosinophils, Absolute 0.03 10*3/mm3      Basophils, Absolute 0.03 10*3/mm3      Immature Grans, Absolute 0.21 10*3/mm3      nRBC 0.0 /100 WBC           Imaging Results (Last 24 Hours)       Procedure Component Value Units Date/Time    US Renal Bilateral [[id number]] Collected: 09/30/24 1750     Updated: 09/30/24 1756    Narrative:      US RENAL BILATERAL-     INDICATIONS: Complex renal cyst versus mass versus infection     TECHNIQUE: ULTRASOUND OF THE KIDNEYS AND URINARY BLADDER.     COMPARISON: CT from same date     FINDINGS:     The right kidney measures 10.0 centimeters, the left kidney measures  10.6 centimeters.     Multiple left renal cysts are apparent, largest measuring 6.0 cm. No  hydronephrosis or echogenic nephrolithiasis.     The urinary bladder appears unremarkable. Bilateral ureteral jets were  observed.       Impression:      Multiple left renal cysts. No solid renal mass was identified by  ultrasound, suggest follow-up evaluation with enhanced renal imaging if  not contraindicated. No hydronephrosis or echogenic nephrolithiasis.        This report was finalized on 9/30/2024  5:53 PM by Dr. Otf Cardenas M.D on Workstation: [id number]       CT Abdomen Pelvis Without Contrast [[id number]] Collected: 09/30/24 1524     Updated: 09/30/24 1544    Narrative:      CT ABDOMEN PELVIS WO CONTRAST-     DATE OF EXAM: 9/30/2024 2:32 PM     INDICATION: Left flank pain, suspect kidney stone.     COMPARISON: Chest radiographs 8/21/2018.     TECHNIQUE: Multiple contiguous axial images were acquired through the  abdomen and pelvis without the intravenous administration of contrast.  Reformatted coronal and sagittal sequences were also reviewed. Radiation  dose reduction techniques were utilized, including automated exposure  control and exposure modulation based on body size.     FINDINGS:  Multifocal bibasilar subsegmental atelectasis and/or scarring. Partially  imaged calcified coronary artery disease. Aortic valve calcifications.     Incompletely evaluated 1.5 cm low-density lesion in the right lobe of  the liver, statistically representing a hepatic cyst or hemangioma.  Status post cholecystectomy. The spleen is unremarkable in limited  noncontrast CT appearance. Fatty atrophy of the pancreas. Nonspecific  but possibly benign low-density nodular thickening of both adrenal  glands. Complex bilobed heterogeneous predominantly low-attenuation  masslike collection in the upper pole of the left kidney and extending  posteriorly into the retroperitoneal fat, measuring approximately 8.5 cm  x 5 cm in axial dimensions (axial series 2 image 51) and 7 cm  craniocaudal (coronal series 4 image 74). Tiny 2 mm nonobstructing  calcification in the lower pole the right kidney. The right kidney is  otherwise unremarkable in limited noncontrast CT appearance. No  obstructing renal or ureteral stone is identified. No hydronephrosis or  hydroureter. The urinary bladder is nondistended. Status post  hysterectomy. The adnexa not definitively identified.     Mild colorectal stool. Colonic diverticula, without CT evidence  of  diverticulitis. Mild diffuse colonic wall thickening could be  accentuated by underdistention but could reflect a nonspecific colitis.  No bowel obstruction. The appendix is normal.     No free fluid in the abdomen or pelvis. No free intraperitoneal air. No  pathologically enlarged lymph nodes are identified, although evaluation  is mildly limited by the lack of intravenous contrast. Moderate to  severe calcified atherosclerotic disease in the abdominal aorta and its  distal branches without aneurysm.     Mild diffuse anasarca. Rectus abdominis diastases. Anterior lower  abdominal wall surgical scar. Diffuse osteopenia. Transitional  lumbosacral vertebral anatomy with partially lumbarized S1 vertebral  segment and rudimentary S1-S2 disc space. Nonspecific sclerotic foci in  the right T10 transverse process and the right side of the sacrum at the  S4 vertebral level, possible benign bone islands. Mild to moderate  bilateral hip and SI joint DJD and mild to moderate DJD of the pubic  symphysis with chondrocalcinosis. No acute osseous abnormality is  identified.       Impression:         1. Nonspecific complex bilobed heterogeneous predominately  low-attenuation masslike collection in the upper pole of the left kidney  and extending posteriorly into the retroperitoneal fat. Findings could  represent a primary renal neoplasm or possible pyelonephritis with  intrarenal and perirenal abscess. Correlate with urinalysis. Could  consider further evaluation with focused ultrasound or dedicated pre and  postcontrast CT or MRI if clinically indicated.  2. Mild diffuse anasarca.  3. Incompletely evaluated 1.5 cm low-density lesion in the right lobe of  the liver, statistically representing a hepatic cyst or hemangioma.  Recommend attention on follow-up.  4. Sclerotic foci in the right T10 transverse process and right side of  the sacrum, possible benign bone islands. Could consider further  evaluation with routine outpatient  nuclear medicine bone scan if  clinically indicated.  5. Mild diffuse colonic wall thickening could be accentuated by  underdistention but could reflect a nonspecific colitis.     This report was finalized on 9/30/2024 3:41 PM by Dom Dalton MD on  Workstation: [id number]               Current Facility-Administered Medications:     amLODIPine (NORVASC) tablet 10 mg, 10 mg, Oral, Nightly, Roger Rush MD    aspirin EC tablet 81 mg, 81 mg, Oral, Nightly, Roger Rush MD    cefTRIAXone (ROCEPHIN) 1,000 mg in sodium chloride 0.9 % 100 mL MBP, 1,000 mg, Intravenous, Q24H, Roger Rsuh MD    famotidine (PEPCID) tablet 20 mg, 20 mg, Oral, BID, Roger Rush MD    memantine (NAMENDA) tablet 10 mg, 10 mg, Oral, Q12H, Roger Rush MD    polycarbophil tablet 625 mg, 625 mg, Oral, Daily, Roger Rush MD    rivastigmine (EXELON) capsule 6 mg, 6 mg, Oral, BID With Meals, Roger Rush MD    [COMPLETED] Insert Peripheral IV, , , Once **AND** sodium chloride 0.9 % flush 10 mL, 10 mL, Intravenous, PRN, [place], Baldo ZACARIAS MD    sodium chloride 0.9 % infusion, 75 mL/hr, Intravenous, Continuous, Roger Rush MD    temazepam (RESTORIL) capsule 30 mg, 30 mg, Oral, Nightly PRN, Roger Rush MD    Current Outpatient Medications:     amLODIPine (NORVASC) 10 MG tablet, Take 1 tablet by mouth Every Night., Disp: , Rfl:     aspirin 81 MG EC tablet, Take 1 tablet by mouth Every Night., Disp: , Rfl:     B Complex-C (SUPER B COMPLEX PO), Take 1 tablet by mouth Daily., Disp: , Rfl:     calcium polycarbophil (FIBER LAXATIVE) 625 MG tablet, Take 1 tablet by mouth Daily., Disp: , Rfl:     cholecalciferol (VITAMIN D3) 1000 units tablet, Take 2 tablets by mouth Daily., Disp: , Rfl:     Cyanocobalamin (B-12 PO), Take 1 tablet by mouth Every Night., Disp: , Rfl:     desloratadine (CLARINEX REDITAB) 5 MG disintegrating tablet, Take 1 tablet by mouth Every Morning., Disp: , Rfl:     ferrous sulfate 325 (65 FE) MG tablet, Take 1 tablet by mouth  Every Night., Disp: , Rfl:     irbesartan (AVAPRO) 300 MG tablet, Take 1 tablet by mouth Every Night., Disp: , Rfl:     memantine (NAMENDA) 10 MG tablet, Take 1 tablet by mouth 2 (Two) Times a Day., Disp: , Rfl:     rivastigmine (EXELON) 6 MG capsule, Take 1 capsule by mouth 2 (Two) Times a Day., Disp: , Rfl:     temazepam (RESTORIL) 30 MG capsule, Take 1 capsule by mouth At Night As Needed for Sleep., Disp: , Rfl:     atenolol (TENORMIN) 100 MG tablet, Take 1 tablet by mouth 2 (Two) Times a Day., Disp: , Rfl:     hydrALAZINE (APRESOLINE) 50 MG tablet, Take 1 tablet by mouth 2 (Two) Times a Day., Disp: , Rfl:     traMADol (ULTRAM) 50 MG tablet, Take 1 tablet by mouth 2 (Two) Times a Day As Needed for Moderate Pain., Disp: , Rfl:      ASSESSMENT  Acute kidney injury  Left cystic renal mass  Acute UTI  Hypertension  Dementia    PLAN  Admit  IVF  IV antibiotics  Discontinue Avapro  Nephrology consult  Urology to follow patient  Adjust home medications  Stress ulcer DVT prophylaxis  Supportive care  DNR  Discussed with family nursing staff  Follow closely further recommendation current hospital course    LATONIA VELASQUEZ MD

## 2024-09-30 NOTE — CONSULTS
FIRST UROLOGY CONSULT      Patient Identification:  NAME:  Anita Frankel  Age:  84 y.o.   Sex:  female   :  1940   MRN:  [id number]     Chief complaint: L renal mass vs cyst    History of present illness:      84 year old female admitted with elevated leukosytosis from PCP. WBC 17 and came to ED. No fevers.  Poor p.o. intake for last couple days.  Has generalized abdominal pain.  No urinary symptoms.  No dysuria.  No hematuria.  No history of recurrent urinary tract infections.  No  pertinent history.    In hospital:  -AVSS, good UOP  -WBC - 17  -Creat - 2.97  -UA -infected; 4+ bacteria and WBC too numerous to count; nitrite positive    -CT - hypodense cystic structure of L upper pole kidney on noncontrast imaging      Asked to see    Past medical history:  Past Medical History:   Diagnosis Date    Hypertension     Kidney stone        Past surgical history:  Past Surgical History:   Procedure Laterality Date    ABDOMINAL SURGERY      polyp removal    CHOLECYSTECTOMY      HYSTERECTOMY      TONSILLECTOMY         Allergies:  Patient has no known allergies.    Home medications:  (Not in a hospital admission)       Hospital medications:  cefTRIAXone, 2,000 mg, Intravenous, Once           [COMPLETED] Insert Peripheral IV **AND** sodium chloride    Family history:  No family history on file.    Social history:  Social History     Tobacco Use    Smoking status: Never    Smokeless tobacco: Never   Substance Use Topics    Alcohol use: Yes     Comment: very rarely    Drug use: No       Review of systems:      Positive for:  nothing  Negative for:  chest pain, cough, sob, o/w neg    Objective:  TMax 24 hours:   Temp (24hrs), Av.4 °F (35.8 °C), Min:96.4 °F (35.8 °C), Max:96.4 °F (35.8 °C)      Vitals Ranges:   Temp:  [96.4 °F (35.8 °C)] 96.4 °F (35.8 °C)  Heart Rate:  [] 90  Resp:  [16] 16  BP: (136-152)/(72-93) 152/93    Intake/Output Last 3 shifts:  No intake/output data recorded.     Physical  Exam:    General Appearance:    Alert, cooperative, NAD   Back:     No CVA tenderness   Lungs:     Respirations unlabored, no wheezing    Heart:    RRR, intact peripheral pulses   Abdomen:     Soft, NDNT, no masses, no guarding   :    Pelvic not performed, bladder nondistended and nontender   Neuro/Psych:   Orientation intact, mood/affect pleasant       Results review:   I reviewed the patient's new clinical results.    Data review:  Lab Results (last 24 hours)       Procedure Component Value Units Date/Time    Lactic Acid, Plasma [[id number]] Collected: 09/30/24 1631    Specimen: Blood Updated: 09/30/24 1644    Urinalysis, Microscopic Only - Straight Cath [[id number]]  (Abnormal) Collected: 09/30/24 1610    Specimen: Urine from Straight Cath Updated: 09/30/24 1641     RBC, UA 0-2 /HPF      WBC, UA Too Numerous to Count /HPF      Bacteria, UA 4+ /HPF      Squamous Epithelial Cells, UA 3-6 /HPF      Hyaline Casts, UA 13-20 /LPF      Methodology Automated Microscopy    Urinalysis With Microscopic If Indicated (No Culture) - Straight Cath [[id number]]  (Abnormal) Collected: 09/30/24 1610    Specimen: Urine from Straight Cath Updated: 09/30/24 1639     Color, UA Dark Yellow     Appearance, UA Turbid     pH, UA 5.5     Specific Gravity, UA 1.015     Glucose, UA Negative     Ketones, UA Trace     Bilirubin, UA Negative     Blood, UA Small (1+)     Protein, UA 30 mg/dL (1+)     Leuk Esterase, UA Large (3+)     Nitrite, UA Positive     Urobilinogen, UA 0.2 E.U./dL    Blood Culture - Blood, Arm, Left [[id number]] Collected: 09/30/24 1622    Specimen: Blood from Arm, Left Updated: 09/30/24 1639    Blood Culture - Blood, Arm, Right [[id number]] Collected: 09/30/24 1631    Specimen: Blood from Arm, Right Updated: 09/30/24 1639    Procalcitonin [[id number]]  (Abnormal) Collected: 09/30/24 1408    Specimen: Blood Updated: 09/30/24 1636     Procalcitonin 1.19 ng/mL     Narrative:      As a Marker for Sepsis (Non-Neonates):    1. <0.5  "ng/mL represents a low risk of severe sepsis and/or septic shock.  2. >2 ng/mL represents a high risk of severe sepsis and/or septic shock.    As a Marker for Lower Respiratory Tract Infections that require antibiotic therapy:    PCT on Admission    Antibiotic Therapy       6-12 Hrs later    >0.5                Strongly Recommended  >0.25 - <0.5        Recommended   0.1 - 0.25          Discouraged              Remeasure/reassess PCT  <0.1                Strongly Discouraged     Remeasure/reassess PCT    As 28 day mortality risk marker: \"Change in Procalcitonin Result\" (>80% or <=80%) if Day 0 (or Day 1) and Day 4 values are available. Refer to http://[email or web address] Association[employer]istForcepct-calculator.com    Change in PCT <=80%  A decrease of PCT levels below or equal to 80% defines a positive change in PCT test result representing a higher risk for 28-day all-cause mortality of patients diagnosed with severe sepsis for septic shock.    Change in PCT >80%  A decrease of PCT levels of more than 80% defines a negative change in PCT result representing a lower risk for 28-day all-cause mortality of patients diagnosed with severe sepsis or septic shock.       Comprehensive Metabolic Panel [[id number]]  (Abnormal) Collected: 09/30/24 1408    Specimen: Blood Updated: 09/30/24 1445     Glucose 109 mg/dL      BUN 53 mg/dL      Creatinine 2.97 mg/dL      Sodium 135 mmol/L      Potassium 4.0 mmol/L      Comment: Slight hemolysis detected by analyzer. Result may be falsely elevated.        Chloride 101 mmol/L      CO2 17.0 mmol/L      Calcium 9.8 mg/dL      Total Protein 7.4 g/dL      Albumin 3.6 g/dL      ALT (SGPT) 15 U/L      AST (SGOT) 22 U/L      Comment: Slight hemolysis detected by analyzer. Result may be falsely elevated.        Alkaline Phosphatase 159 U/L      Total Bilirubin 0.5 mg/dL      Globulin 3.8 gm/dL      A/G Ratio 0.9 g/dL      BUN/Creatinine Ratio 17.8     Anion Gap 17.0 mmol/L      eGFR 14.9 mL/min/1.73      Comment: <15 Indicative " of kidney failure       Narrative:      GFR Normal >60  Chronic Kidney Disease <60  Kidney Failure <15    The GFR formula is only valid for adults with stable renal function between ages 18 and 70.    Lipase [[id number]]  (Normal) Collected: 09/30/24 1408    Specimen: Blood Updated: 09/30/24 1444     Lipase 14 U/L     CBC & Differential [[id number]]  (Abnormal) Collected: 09/30/24 1408    Specimen: Blood Updated: 09/30/24 1420    Narrative:      The following orders were created for panel order CBC & Differential.  Procedure                               Abnormality         Status                     ---------                               -----------         ------                     CBC Auto Differential[[id number]]        Abnormal            Final result                 Please view results for these tests on the individual orders.    CBC Auto Differential [[id number]]  (Abnormal) Collected: 09/30/24 1408    Specimen: Blood Updated: 09/30/24 1420     WBC 17.12 10*3/mm3      RBC 3.51 10*6/mm3      Hemoglobin 10.6 g/dL      Hematocrit 31.9 %      MCV 90.9 fL      MCH 30.2 pg      MCHC 33.2 g/dL      RDW 12.6 %      RDW-SD 41.0 fl      MPV 11.2 fL      Platelets 267 10*3/mm3      Neutrophil % 83.5 %      Lymphocyte % 5.4 %      Monocyte % 9.5 %      Eosinophil % 0.2 %      Basophil % 0.2 %      Immature Grans % 1.2 %      Neutrophils, Absolute 14.30 10*3/mm3      Lymphocytes, Absolute 0.93 10*3/mm3      Monocytes, Absolute 1.62 10*3/mm3      Eosinophils, Absolute 0.03 10*3/mm3      Basophils, Absolute 0.03 10*3/mm3      Immature Grans, Absolute 0.21 10*3/mm3      nRBC 0.0 /100 WBC              Imaging:  Imaging Results (Last 24 Hours)       Procedure Component Value Units Date/Time    CT Abdomen Pelvis Without Contrast [[id number]] Collected: 09/30/24 1524     Updated: 09/30/24 1544    Narrative:      CT ABDOMEN PELVIS WO CONTRAST-     DATE OF EXAM: 9/30/2024 2:32 PM     INDICATION: Left flank pain, suspect kidney  stone.     COMPARISON: Chest radiographs 8/21/2018.     TECHNIQUE: Multiple contiguous axial images were acquired through the  abdomen and pelvis without the intravenous administration of contrast.  Reformatted coronal and sagittal sequences were also reviewed. Radiation  dose reduction techniques were utilized, including automated exposure  control and exposure modulation based on body size.     FINDINGS:  Multifocal bibasilar subsegmental atelectasis and/or scarring. Partially  imaged calcified coronary artery disease. Aortic valve calcifications.     Incompletely evaluated 1.5 cm low-density lesion in the right lobe of  the liver, statistically representing a hepatic cyst or hemangioma.  Status post cholecystectomy. The spleen is unremarkable in limited  noncontrast CT appearance. Fatty atrophy of the pancreas. Nonspecific  but possibly benign low-density nodular thickening of both adrenal  glands. Complex bilobed heterogeneous predominantly low-attenuation  masslike collection in the upper pole of the left kidney and extending  posteriorly into the retroperitoneal fat, measuring approximately 8.5 cm  x 5 cm in axial dimensions (axial series 2 image 51) and 7 cm  craniocaudal (coronal series 4 image 74). Tiny 2 mm nonobstructing  calcification in the lower pole the right kidney. The right kidney is  otherwise unremarkable in limited noncontrast CT appearance. No  obstructing renal or ureteral stone is identified. No hydronephrosis or  hydroureter. The urinary bladder is nondistended. Status post  hysterectomy. The adnexa not definitively identified.     Mild colorectal stool. Colonic diverticula, without CT evidence of  diverticulitis. Mild diffuse colonic wall thickening could be  accentuated by underdistention but could reflect a nonspecific colitis.  No bowel obstruction. The appendix is normal.     No free fluid in the abdomen or pelvis. No free intraperitoneal air. No  pathologically enlarged lymph nodes are  identified, although evaluation  is mildly limited by the lack of intravenous contrast. Moderate to  severe calcified atherosclerotic disease in the abdominal aorta and its  distal branches without aneurysm.     Mild diffuse anasarca. Rectus abdominis diastases. Anterior lower  abdominal wall surgical scar. Diffuse osteopenia. Transitional  lumbosacral vertebral anatomy with partially lumbarized S1 vertebral  segment and rudimentary S1-S2 disc space. Nonspecific sclerotic foci in  the right T10 transverse process and the right side of the sacrum at the  S4 vertebral level, possible benign bone islands. Mild to moderate  bilateral hip and SI joint DJD and mild to moderate DJD of the pubic  symphysis with chondrocalcinosis. No acute osseous abnormality is  identified.       Impression:         1. Nonspecific complex bilobed heterogeneous predominately  low-attenuation masslike collection in the upper pole of the left kidney  and extending posteriorly into the retroperitoneal fat. Findings could  represent a primary renal neoplasm or possible pyelonephritis with  intrarenal and perirenal abscess. Correlate with urinalysis. Could  consider further evaluation with focused ultrasound or dedicated pre and  postcontrast CT or MRI if clinically indicated.  2. Mild diffuse anasarca.  3. Incompletely evaluated 1.5 cm low-density lesion in the right lobe of  the liver, statistically representing a hepatic cyst or hemangioma.  Recommend attention on follow-up.  4. Sclerotic foci in the right T10 transverse process and right side of  the sacrum, possible benign bone islands. Could consider further  evaluation with routine outpatient nuclear medicine bone scan if  clinically indicated.  5. Mild diffuse colonic wall thickening could be accentuated by  underdistention but could reflect a nonspecific colitis.     This report was finalized on 9/30/2024 3:41 PM by Dom Dalton MD on  Workstation: [id number]                 Assessment:     84 year old female with     L cystic renal mass; new and undiagnosed  SHARON; new and undiagnosed    Plan:     -Renal ultrasound to better characterize this cystic lesion on the left side  -IV fluids and trend creatinine  -Will potentially need repeat axial imaging with contrast or an MRI in the future to better delineate this left sided cystic structure  -Does appear to have a bad UTI; treat with antibiotics; urine culture pending  -If at any point she starts to develop fevers or abnormal vitals potentially will need to have IR aspirate or drain this cystic structure out of concern for potential abscess?  -Will follow    Sea Limon MD  09/30/24  16:49 EDT

## 2024-09-30 NOTE — PROGRESS NOTES
Clinical Pharmacy Services: Medication History    Anita Frankel is a 84 y.o. female presenting to [facility] for   Chief Complaint   Patient presents with    Abnormal Lab     Elevated WBC per family       She  has a past medical history of Hypertension and Kidney stone.    Allergies as of 09/30/2024    (No Known Allergies)       Medication information was obtained from: Family Member   Pharmacy and Phone Number:     Prior to Admission Medications       Prescriptions Last Dose Informant Patient Reported? Taking?    amLODIPine (NORVASC) 10 MG tablet  Family Member Yes Yes    Take 1 tablet by mouth Every Night.    aspirin 81 MG EC tablet  Family Member Yes Yes    Take 1 tablet by mouth Every Night.    B Complex-C (SUPER B COMPLEX PO)  Family Member Yes Yes    Take 1 tablet by mouth Daily.    calcium polycarbophil (FIBER LAXATIVE) 625 MG tablet  Family Member Yes Yes    Take 1 tablet by mouth Daily.    cholecalciferol (VITAMIN D3) 1000 units tablet  Family Member Yes Yes    Take 2 tablets by mouth Daily.    Cyanocobalamin (B-12 PO)  Family Member Yes Yes    Take 1 tablet by mouth Every Night.    desloratadine (CLARINEX REDITAB) 5 MG disintegrating tablet  Family Member Yes Yes    Take 1 tablet by mouth Every Morning.    ferrous sulfate 325 (65 FE) MG tablet  Family Member Yes Yes    Take 1 tablet by mouth Every Night.    irbesartan (AVAPRO) 300 MG tablet  Family Member Yes Yes    Take 1 tablet by mouth Every Night.    memantine (NAMENDA) 10 MG tablet  Family Member Yes Yes    Take 1 tablet by mouth 2 (Two) Times a Day.    rivastigmine (EXELON) 6 MG capsule  Family Member Yes Yes    Take 1 capsule by mouth 2 (Two) Times a Day.    temazepam (RESTORIL) 30 MG capsule  Family Member Yes Yes    Take 1 capsule by mouth At Night As Needed for Sleep.    atenolol (TENORMIN) 100 MG tablet   Yes No    Take 1 tablet by mouth 2 (Two) Times a Day.    hydrALAZINE (APRESOLINE) 50 MG tablet   Yes No    Take 1 tablet by  mouth 2 (Two) Times a Day.    traMADol (ULTRAM) 50 MG tablet   Yes No    Take 1 tablet by mouth 2 (Two) Times a Day As Needed for Moderate Pain.              Medication notes:     This medication list is complete to the best of my knowledge as of 9/30/2024    Please call if questions.    Becka Ghotra  Medication History Technician   [phone]    9/30/2024 16:36 EDT

## 2024-09-30 NOTE — ED PROVIDER NOTES
EMERGENCY DEPARTMENT ENCOUNTER  Room Number:  N532/1  PCP: Rowan Bauer MD  Independent Historians: Patient, daughter at bedside      HPI:  Chief Complaint: had concerns including Abnormal Lab (Elevated WBC per family).  Left flank pain    A complete HPI/ROS/PMH/PSH/SH/FH are unobtainable due to:   Chronic or social conditions impacting patient care (Social Determinants of Health):       Context: Anita Frankel is a 84 y.o. female with a medical history of hypertension, memory loss who presents to the ED c/o acute left flank pain.  Pain is a dull ache and worsened by nothing, improved by nothing.  Pain is moderate at its worst and mild currently.  Patient was a primary care provider to get checked out and apparently had elevated white blood count was told to come to the ER for further workup.  Patient denies history of prior pain in the past although she thinks she might of had a kidney stone many years ago.  Prior abdominal surgery includes cholecystectomy and hysterectomy.  Denies dysuria or problems with bowel.  No recent fever.  No chest pain or trouble breathing.      Review of prior external notes (non-ED) -and- Review of prior external test results outside of this encounter:   I reviewed prior medical records including recent neurology office note with [facility] neurology.  Patient seen in follow-up for memory loss dementia.      Prescription drug monitoring program review:         PAST MEDICAL HISTORY  Active Ambulatory Problems     Diagnosis Date Noted    No Active Ambulatory Problems     Resolved Ambulatory Problems     Diagnosis Date Noted    No Resolved Ambulatory Problems     Past Medical History:   Diagnosis Date    Hypertension     Kidney stone          PAST SURGICAL HISTORY  Past Surgical History:   Procedure Laterality Date    ABDOMINAL SURGERY      polyp removal    CHOLECYSTECTOMY      HYSTERECTOMY      TONSILLECTOMY           FAMILY HISTORY  History reviewed. No pertinent family  history.      SOCIAL HISTORY  Social History     Socioeconomic History    Marital status:    Tobacco Use    Smoking status: Never     Passive exposure: Past    Smokeless tobacco: Never   Vaping Use    Vaping status: Never Used   Substance and Sexual Activity    Alcohol use: Yes     Comment: very rarely    Drug use: No    Sexual activity: Defer         ALLERGIES  Patient has no known allergies.      REVIEW OF SYSTEMS  Review of Systems   Constitutional:  Negative for fever.   Respiratory:  Negative for shortness of breath.    Cardiovascular:  Negative for chest pain.   Genitourinary:  Positive for flank pain.   All other systems reviewed and are negative.    Included in HPI  All systems reviewed and negative except for those discussed in HPI.      PHYSICAL EXAM    I have reviewed the triage vital signs and nursing notes.    ED Triage Vitals   Temp Heart Rate Resp BP SpO2   09/30/24 1333 09/30/24 1333 09/30/24 1333 09/30/24 1336 09/30/24 1333   96.4 °F (35.8 °C) 106 16 136/72 96 %      Temp src Heart Rate Source Patient Position BP Location FiO2 (%)   09/30/24 1333 09/30/24 1333 09/30/24 1336 09/30/24 1336 --   Tympanic Monitor Sitting Right arm        Physical Exam  GENERAL: Alert well-appearing female no obvious distress.  Triage vitals reviewed notable for mild tachycardia with pulse 106.  Temperature, blood pressure and O2 sats unremarkable  SKIN: Warm, dry  HENT: Normocephalic, atraumatic  EYES: no scleral icterus  CV: regular rhythm, regular rate  RESPIRATORY: normal effort, lungs clear  ABDOMEN: soft, nontender, nondistended  MUSCULOSKELETAL: no deformity  NEURO: alert, moves all extremities, follows commands      LAB RESULTS  Recent Results (from the past 24 hour(s))   BNP    Collection Time: 09/30/24 11:50 PM    Specimen: Blood   Result Value Ref Range    proBNP 1,009.0 0.0 - 1,800.0 pg/mL   ECG 12 Lead Rhythm Change    Collection Time: 10/01/24  1:23 AM   Result Value Ref Range    QT Interval 315 ms     QTC Interval 415 ms   Comprehensive Metabolic Panel    Collection Time: 10/01/24  2:48 AM    Specimen: Blood   Result Value Ref Range    Glucose 94 65 - 99 mg/dL    BUN 43 (H) 8 - 23 mg/dL    Creatinine 2.15 (H) 0.57 - 1.00 mg/dL    Sodium 136 136 - 145 mmol/L    Potassium 3.6 3.5 - 5.2 mmol/L    Chloride 104 98 - 107 mmol/L    CO2 16.4 (L) 22.0 - 29.0 mmol/L    Calcium 9.2 8.6 - 10.5 mg/dL    Total Protein 6.4 6.0 - 8.5 g/dL    Albumin 3.2 (L) 3.5 - 5.2 g/dL    ALT (SGPT) 14 1 - 33 U/L    AST (SGOT) 20 1 - 32 U/L    Alkaline Phosphatase 156 (H) 39 - 117 U/L    Total Bilirubin 0.3 0.0 - 1.2 mg/dL    Globulin 3.2 gm/dL    A/G Ratio 1.0 g/dL    BUN/Creatinine Ratio 20.0 7.0 - 25.0    Anion Gap 15.6 (H) 5.0 - 15.0 mmol/L    eGFR 21.9 (L) >60.0 mL/min/1.73   TSH    Collection Time: 10/01/24  2:48 AM    Specimen: Blood   Result Value Ref Range    TSH 0.701 0.270 - 4.200 uIU/mL   Lipid Panel    Collection Time: 10/01/24  2:48 AM    Specimen: Blood   Result Value Ref Range    Total Cholesterol 129 0 - 200 mg/dL    Triglycerides 94 0 - 150 mg/dL    HDL Cholesterol 43 40 - 60 mg/dL    LDL Cholesterol  68 0 - 100 mg/dL    VLDL Cholesterol 18 5 - 40 mg/dL    LDL/HDL Ratio 1.56    Hemoglobin A1c    Collection Time: 10/01/24  2:48 AM    Specimen: Blood   Result Value Ref Range    Hemoglobin A1C 5.00 4.80 - 5.60 %   CBC Auto Differential    Collection Time: 10/01/24  2:48 AM    Specimen: Blood   Result Value Ref Range    WBC 15.88 (H) 3.40 - 10.80 10*3/mm3    RBC 3.27 (L) 3.77 - 5.28 10*6/mm3    Hemoglobin 9.4 (L) 12.0 - 15.9 g/dL    Hematocrit 29.8 (L) 34.0 - 46.6 %    MCV 91.1 79.0 - 97.0 fL    MCH 28.7 26.6 - 33.0 pg    MCHC 31.5 31.5 - 35.7 g/dL    RDW 12.4 12.3 - 15.4 %    RDW-SD 41.9 37.0 - 54.0 fl    MPV 11.1 6.0 - 12.0 fL    Platelets 270 140 - 450 10*3/mm3    Neutrophil % 82.6 (H) 42.7 - 76.0 %    Lymphocyte % 6.3 (L) 19.6 - 45.3 %    Monocyte % 9.2 5.0 - 12.0 %    Eosinophil % 0.1 (L) 0.3 - 6.2 %    Basophil % 0.2  0.0 - 1.5 %    Immature Grans % 1.6 (H) 0.0 - 0.5 %    Neutrophils, Absolute 13.12 (H) 1.70 - 7.00 10*3/mm3    Lymphocytes, Absolute 1.00 0.70 - 3.10 10*3/mm3    Monocytes, Absolute 1.46 (H) 0.10 - 0.90 10*3/mm3    Eosinophils, Absolute 0.01 0.00 - 0.40 10*3/mm3    Basophils, Absolute 0.03 0.00 - 0.20 10*3/mm3    Immature Grans, Absolute 0.26 (H) 0.00 - 0.05 10*3/mm3    nRBC 0.0 0.0 - 0.2 /100 WBC   Uric Acid    Collection Time: 10/01/24  2:48 AM    Specimen: Blood   Result Value Ref Range    Uric Acid 8.9 (H) 2.4 - 5.7 mg/dL   Phosphorus    Collection Time: 10/01/24  2:48 AM    Specimen: Blood   Result Value Ref Range    Phosphorus 2.9 2.5 - 4.5 mg/dL   Magnesium    Collection Time: 10/01/24  2:48 AM    Specimen: Blood   Result Value Ref Range    Magnesium 1.5 (L) 1.6 - 2.4 mg/dL   ECG 12 Lead Drug Monitoring; Amiodarone    Collection Time: 10/01/24  3:23 AM   Result Value Ref Range    QT Interval 320 ms    QTC Interval 409 ms         RADIOLOGY  No Radiology Exams Resulted Within Past 24 Hours      MEDICATIONS GIVEN IN ER  Medications   sodium chloride 0.9 % flush 10 mL (has no administration in time range)   amLODIPine (NORVASC) tablet 10 mg (10 mg Oral Given 10/1/24 2025)   aspirin EC tablet 81 mg (81 mg Oral Given 10/1/24 2025)   polycarbophil tablet 625 mg (625 mg Oral Given 10/1/24 0836)   memantine (NAMENDA) tablet 10 mg (10 mg Oral Given 10/1/24 2025)   rivastigmine (EXELON) capsule 6 mg ( Oral Canceled Entry 10/1/24 1741)   temazepam (RESTORIL) capsule 30 mg (has no administration in time range)   cefTRIAXone (ROCEPHIN) 1,000 mg in sodium chloride 0.9 % 100 mL MBP (1,000 mg Intravenous New Bag 10/1/24 1321)   famotidine (PEPCID) tablet 20 mg (20 mg Oral Given 10/1/24 2025)   amiodarone 150 mg in 100 mL D5W (loading dose) (150 mg Intravenous Not Given 10/1/24 0030)     Followed by   amiodarone 360 mg in 200 mL D5W infusion (1 mg/min Intravenous Not Given 9/30/24 2133)     Followed by   amiodarone 360 mg in  200 mL D5W infusion (0.5 mg/min Intravenous Not Given 10/1/24 0327)   sodium bicarbonate tablet 1,300 mg (1,300 mg Oral Given 10/1/24 2025)   sodium chloride 0.9 % bolus 1,000 mL (0 mL Intravenous Stopped 9/30/24 1900)   cefTRIAXone (ROCEPHIN) 2,000 mg in sodium chloride 0.9 % 100 mL MBP (0 mg Intravenous Stopped 9/30/24 1859)   metoprolol tartrate (LOPRESSOR) injection 5 mg (5 mg Intravenous Given 9/30/24 2111)   magnesium sulfate 2g/50 mL (PREMIX) infusion (2 g Intravenous New Bag 10/1/24 1449)   potassium chloride (K-DUR,KLOR-CON) ER tablet 40 mEq (40 mEq Oral Given 10/1/24 1448)         ORDERS PLACED DURING THIS VISIT:  Orders Placed This Encounter   Procedures    Blood Culture - Blood,    Blood Culture - Blood,    CT Abdomen Pelvis Without Contrast    US Renal Bilateral    Comprehensive Metabolic Panel    Urinalysis With Microscopic If Indicated (No Culture) - Urine, Clean Catch    Lipase    CBC Auto Differential    Lactic Acid, Plasma    Procalcitonin    Urinalysis, Microscopic Only - Urine, Clean Catch    Comprehensive Metabolic Panel    BNP    TSH    Lipid Panel    Hemoglobin A1c    CBC Auto Differential    Uric Acid    Phosphorus    Magnesium    Sodium, Urine, Random -    Creatinine Urine Random (kidney function) GFR component -    Magnesium    Phosphorus    Comprehensive Metabolic Panel    Uric Acid    CBC Auto Differential    Diet: Regular/House; Fluid Consistency: Thin (IDDSI 0)    Place Sequential Compression Device    Maintain Sequential Compression Device    Monitor QTc    Notify Provider - QTc 500 milliseconds or Greater    Code Status and Medical Interventions: No CPR (Do Not Attempt to Resuscitate); Limited Support; No cardioversion, No intubation (DNI)    Urology (on-call MD unless specified)    Nephrology (on -call MD unless specified)    Inpatient Cardiology Consult    Inpatient Case Management  Consult    OT Consult: Eval & Treat ADL Performance Below Baseline    PT Consult: Eval  & Treat Functional Mobility Below Baseline    PT Plan of Care Cert / Re-Cert    ECG 12 Lead Rhythm Change    ECG 12 Lead Drug Monitoring; Amiodarone    Telemetry Scan    Telemetry Scan    ECG 12 Lead Rhythm Change    Telemetry Scan    ECG 12 Lead Other; ER EKG    Telemetry Scan    Adult Transthoracic Echo Complete W/ Cont if Necessary Per Protocol    Insert Peripheral IV    Initiate Observation Status    Inpatient Admission    CBC & Differential    CBC & Differential    CBC & Differential         OUTPATIENT MEDICATION MANAGEMENT:  Current Facility-Administered Medications Ordered in Epic   Medication Dose Route Frequency Provider Last Rate Last Admin    amiodarone 150 mg in 100 mL D5W (loading dose)  150 mg Intravenous Once Braden James MD        amLODIPine (NORVASC) tablet 10 mg  10 mg Oral Nightly Roger Rush MD   10 mg at 10/01/24 2025    aspirin EC tablet 81 mg  81 mg Oral Nightly Roger Rush MD   81 mg at 10/01/24 2025    cefTRIAXone (ROCEPHIN) 1,000 mg in sodium chloride 0.9 % 100 mL MBP  1,000 mg Intravenous Q24H Roger Rush  mL/hr at 10/01/24 1321 1,000 mg at 10/01/24 1321    famotidine (PEPCID) tablet 20 mg  20 mg Oral BID Roger Rush MD   20 mg at 10/01/24 2025    memantine (NAMENDA) tablet 10 mg  10 mg Oral Q12H Roger Rush MD   10 mg at 10/01/24 2025    polycarbophil tablet 625 mg  625 mg Oral Daily Roger Rush MD   625 mg at 10/01/24 0836    rivastigmine (EXELON) capsule 6 mg  6 mg Oral BID With Meals Roger Rush MD   6 mg at 10/01/24 1624    sodium bicarbonate tablet 1,300 mg  1,300 mg Oral TID Gomez Mesa MD   1,300 mg at 10/01/24 2025    sodium chloride 0.9 % flush 10 mL  10 mL Intravenous PRN Baldo Dominguez MD        temazepam (RESTORIL) capsule 30 mg  30 mg Oral Nightly PRN Roger Rush MD         No current [facility]-ordered outpatient medications on file.         PROCEDURES  Procedures            PROGRESS, DATA ANALYSIS, CONSULTS, AND MEDICAL DECISION  MAKING  All labs have been independently interpreted by me.  All radiology studies have been reviewed by me. All EKG's have been independently viewed and interpreted by me.  Discussion below represents my analysis of pertinent findings related to patient's condition, differential diagnosis, treatment plan and final disposition.    Differential diagnosis includes but is not limited to kidney stone, UTI, musculoskeletal pain, diverticulitis or colitis.      ED Course as of 10/01/24 2301   Mon Sep 30, 2024   1428 Patient does have elevated white count of 17.1 of unclear clinical significance.  Hemoglobin 10.6 without recent comparison, last hemoglobin 4 years ago 11.8. [DB]   1449 Chemistries show acute renal failure with elevated BUN/creatinine of 53 and 2.97.  Etiology of renal failure is unclear, would consider ureteral obstruction on the left with left flank pain.  Patient also has metabolic acidosis with bicarb of 17. [DB]   1452 CT imaging independently interpreted by me shows marked distention of the left kidney. [DB]   1631 Phone call with Dr Limon, urology.  Discussed the patient, relevant history, exam, diagnostics, ED findings/progress, and concerns.  He states his desire for a contrasted study if patient's kidney function improved, states no plans for operative intervention at present but patient may need IR procedure if they become febrile.  They agree to consult.    [JG]   1642 Phone call with Dr. Mesa, nephrology.  Discussed the patient, relevant history, exam, diagnostics, ED findings/progress, and concerns. They agree to consult.    [JG]   1644 Phone call with Dr. Rush.  Discussed the patient, relevant history, exam, diagnostics, ED findings/progress, and concerns. They agree to admit the patient to telemetry observation. Care assumed by the admitting physician at this time.     [JG]      ED Course User Index  [DB] Baldo Dominguez MD  [JG] Savage Amador MD             AS OF 23:01 EDT  VITALS:    BP - 151/56  HR - 100  TEMP - 99.3 °F (37.4 °C) (Oral)  O2 SATS - 98%    COMPLEXITY OF CARE  Complicated patient presenting with left flank pain and elevated white count at primary care office.    Please see ED course above my independent valuation for potation of ED testing.  She was found to have acute renal failure, leukocytosis and abnormal CT scan showing left-sided renal mass.    Care of this patient was discussed with Dr. Rush from [facility], on-call urology and Dr. Mesa from nephrology.  Patient treated with IV antibiotics and pain medication.      DIAGNOSIS  Final diagnoses:   Left flank pain   Acute renal failure, unspecified acute renal failure type   Leukocytosis, unspecified type   Abnormal CT of the abdomen   SHARON (acute kidney injury)         DISPOSITION  ED Disposition       ED Disposition   Decision to Admit    Condition   --    Comment   Level of Care: Telemetry [5]   Diagnosis: SHARON (acute kidney injury) [[id number]]   Admitting Physician: LATONIA RUSH [[id number]]   Attending Physician: LATONIA RUSH [[id number]]                  Please note that portions of this document were completed with a voice recognition program.    Note Disclaimer: At [facility], we believe that sharing information builds trust and better relationships. You are receiving this note because you recently visited [facility]. It is possible you will see health information before a provider has talked with you about it. This kind of information can be easy to misunderstand. To help you fully understand what it means for your health, we urge you to discuss this note with your provider.         Baldo Dominguez MD  10/01/24 1067

## 2024-09-30 NOTE — ED TRIAGE NOTES
Patient was sent by PCP for elevated WBC. Patient reports it was being checked at a routine checkup.

## 2024-10-01 LAB
ALBUMIN SERPL-MCNC: 3.2 G/DL (ref 3.5–5.2)
ALBUMIN/GLOB SERPL: 1 G/DL
ALP SERPL-CCNC: 156 U/L (ref 39–117)
ALT SERPL W P-5'-P-CCNC: 14 U/L (ref 1–33)
ANION GAP SERPL CALCULATED.3IONS-SCNC: 15.6 MMOL/L (ref 5–15)
AST SERPL-CCNC: 20 U/L (ref 1–32)
BASOPHILS # BLD AUTO: 0.03 10*3/MM3 (ref 0–0.2)
BASOPHILS NFR BLD AUTO: 0.2 % (ref 0–1.5)
BILIRUB SERPL-MCNC: 0.3 MG/DL (ref 0–1.2)
BUN SERPL-MCNC: 43 MG/DL (ref 8–23)
BUN/CREAT SERPL: 20 (ref 7–25)
CALCIUM SPEC-SCNC: 9.2 MG/DL (ref 8.6–10.5)
CHLORIDE SERPL-SCNC: 104 MMOL/L (ref 98–107)
CHOLEST SERPL-MCNC: 129 MG/DL (ref 0–200)
CO2 SERPL-SCNC: 16.4 MMOL/L (ref 22–29)
CREAT SERPL-MCNC: 2.15 MG/DL (ref 0.57–1)
DEPRECATED RDW RBC AUTO: 41.9 FL (ref 37–54)
EGFRCR SERPLBLD CKD-EPI 2021: 21.9 ML/MIN/1.73
EOSINOPHIL # BLD AUTO: 0.01 10*3/MM3 (ref 0–0.4)
EOSINOPHIL NFR BLD AUTO: 0.1 % (ref 0.3–6.2)
ERYTHROCYTE [DISTWIDTH] IN BLOOD BY AUTOMATED COUNT: 12.4 % (ref 12.3–15.4)
GLOBULIN UR ELPH-MCNC: 3.2 GM/DL
GLUCOSE SERPL-MCNC: 94 MG/DL (ref 65–99)
HBA1C MFR BLD: 5 % (ref 4.8–5.6)
HCT VFR BLD AUTO: 29.8 % (ref 34–46.6)
HDLC SERPL-MCNC: 43 MG/DL (ref 40–60)
HGB BLD-MCNC: 9.4 G/DL (ref 12–15.9)
IMM GRANULOCYTES # BLD AUTO: 0.26 10*3/MM3 (ref 0–0.05)
IMM GRANULOCYTES NFR BLD AUTO: 1.6 % (ref 0–0.5)
LDLC SERPL CALC-MCNC: 68 MG/DL (ref 0–100)
LDLC/HDLC SERPL: 1.56 {RATIO}
LYMPHOCYTES # BLD AUTO: 1 10*3/MM3 (ref 0.7–3.1)
LYMPHOCYTES NFR BLD AUTO: 6.3 % (ref 19.6–45.3)
MAGNESIUM SERPL-MCNC: 1.5 MG/DL (ref 1.6–2.4)
MCH RBC QN AUTO: 28.7 PG (ref 26.6–33)
MCHC RBC AUTO-ENTMCNC: 31.5 G/DL (ref 31.5–35.7)
MCV RBC AUTO: 91.1 FL (ref 79–97)
MONOCYTES # BLD AUTO: 1.46 10*3/MM3 (ref 0.1–0.9)
MONOCYTES NFR BLD AUTO: 9.2 % (ref 5–12)
NEUTROPHILS NFR BLD AUTO: 13.12 10*3/MM3 (ref 1.7–7)
NEUTROPHILS NFR BLD AUTO: 82.6 % (ref 42.7–76)
NRBC BLD AUTO-RTO: 0 /100 WBC (ref 0–0.2)
NT-PROBNP SERPL-MCNC: 1009 PG/ML (ref 0–1800)
PHOSPHATE SERPL-MCNC: 2.9 MG/DL (ref 2.5–4.5)
PLATELET # BLD AUTO: 270 10*3/MM3 (ref 140–450)
PMV BLD AUTO: 11.1 FL (ref 6–12)
POTASSIUM SERPL-SCNC: 3.6 MMOL/L (ref 3.5–5.2)
PROT SERPL-MCNC: 6.4 G/DL (ref 6–8.5)
QT INTERVAL: 210 MS
QT INTERVAL: 307 MS
QT INTERVAL: 315 MS
QTC INTERVAL: 347 MS
QTC INTERVAL: 415 MS
QTC INTERVAL: 492 MS
RBC # BLD AUTO: 3.27 10*6/MM3 (ref 3.77–5.28)
SODIUM SERPL-SCNC: 136 MMOL/L (ref 136–145)
TRIGL SERPL-MCNC: 94 MG/DL (ref 0–150)
TSH SERPL DL<=0.05 MIU/L-ACNC: 0.7 UIU/ML (ref 0.27–4.2)
URATE SERPL-MCNC: 8.9 MG/DL (ref 2.4–5.7)
VLDLC SERPL-MCNC: 18 MG/DL (ref 5–40)
WBC NRBC COR # BLD AUTO: 15.88 10*3/MM3 (ref 3.4–10.8)

## 2024-10-01 PROCEDURE — 93005 ELECTROCARDIOGRAM TRACING: CPT | Performed by: HOSPITALIST

## 2024-10-01 PROCEDURE — 97530 THERAPEUTIC ACTIVITIES: CPT

## 2024-10-01 PROCEDURE — 84550 ASSAY OF BLOOD/URIC ACID: CPT | Performed by: INTERNAL MEDICINE

## 2024-10-01 PROCEDURE — 25010000002 CEFTRIAXONE PER 250 MG: Performed by: HOSPITALIST

## 2024-10-01 PROCEDURE — 84100 ASSAY OF PHOSPHORUS: CPT | Performed by: INTERNAL MEDICINE

## 2024-10-01 PROCEDURE — 80053 COMPREHEN METABOLIC PANEL: CPT | Performed by: HOSPITALIST

## 2024-10-01 PROCEDURE — 93010 ELECTROCARDIOGRAM REPORT: CPT | Performed by: INTERNAL MEDICINE

## 2024-10-01 PROCEDURE — 83036 HEMOGLOBIN GLYCOSYLATED A1C: CPT | Performed by: HOSPITALIST

## 2024-10-01 PROCEDURE — 80061 LIPID PANEL: CPT | Performed by: HOSPITALIST

## 2024-10-01 PROCEDURE — 97165 OT EVAL LOW COMPLEX 30 MIN: CPT

## 2024-10-01 PROCEDURE — 85025 COMPLETE CBC W/AUTO DIFF WBC: CPT | Performed by: HOSPITALIST

## 2024-10-01 PROCEDURE — 25010000002 MAGNESIUM SULFATE 2 GM/50ML SOLUTION

## 2024-10-01 PROCEDURE — 93005 ELECTROCARDIOGRAM TRACING: CPT | Performed by: INTERNAL MEDICINE

## 2024-10-01 PROCEDURE — 83735 ASSAY OF MAGNESIUM: CPT | Performed by: INTERNAL MEDICINE

## 2024-10-01 PROCEDURE — 97161 PT EVAL LOW COMPLEX 20 MIN: CPT

## 2024-10-01 PROCEDURE — 84443 ASSAY THYROID STIM HORMONE: CPT | Performed by: HOSPITALIST

## 2024-10-01 PROCEDURE — 97535 SELF CARE MNGMENT TRAINING: CPT

## 2024-10-01 RX ORDER — POTASSIUM CHLORIDE 750 MG/1
40 TABLET, FILM COATED, EXTENDED RELEASE ORAL ONCE
Status: COMPLETED | OUTPATIENT
Start: 2024-10-01 | End: 2024-10-01

## 2024-10-01 RX ORDER — MAGNESIUM SULFATE HEPTAHYDRATE 40 MG/ML
2 INJECTION, SOLUTION INTRAVENOUS ONCE
Status: COMPLETED | OUTPATIENT
Start: 2024-10-01 | End: 2024-10-01

## 2024-10-01 RX ORDER — SODIUM BICARBONATE 650 MG/1
1300 TABLET ORAL 3 TIMES DAILY
Status: DISCONTINUED | OUTPATIENT
Start: 2024-10-01 | End: 2024-10-04

## 2024-10-01 RX ORDER — POTASSIUM CHLORIDE 750 MG/1
40 TABLET, FILM COATED, EXTENDED RELEASE ORAL ONCE
Status: DISCONTINUED | OUTPATIENT
Start: 2024-10-01 | End: 2024-10-01

## 2024-10-01 RX ADMIN — MEMANTINE HYDROCHLORIDE 10 MG: 10 TABLET, FILM COATED ORAL at 20:25

## 2024-10-01 RX ADMIN — FAMOTIDINE 20 MG: 20 TABLET, FILM COATED ORAL at 20:25

## 2024-10-01 RX ADMIN — RIVASTIGMINE TARTRATE 6 MG: 1.5 CAPSULE ORAL at 16:24

## 2024-10-01 RX ADMIN — SODIUM BICARBONATE 650 MG: 650 TABLET ORAL at 08:36

## 2024-10-01 RX ADMIN — FAMOTIDINE 20 MG: 20 TABLET, FILM COATED ORAL at 08:36

## 2024-10-01 RX ADMIN — SODIUM BICARBONATE 1300 MG: 650 TABLET, ORALLY DISINTEGRATING ORAL at 20:25

## 2024-10-01 RX ADMIN — POTASSIUM CHLORIDE 40 MEQ: 750 TABLET, EXTENDED RELEASE ORAL at 14:48

## 2024-10-01 RX ADMIN — CALCIUM POLYCARBOPHIL 625 MG: 625 TABLET, FILM COATED ORAL at 08:36

## 2024-10-01 RX ADMIN — MEMANTINE HYDROCHLORIDE 10 MG: 10 TABLET, FILM COATED ORAL at 08:36

## 2024-10-01 RX ADMIN — RIVASTIGMINE TARTRATE 6 MG: 1.5 CAPSULE ORAL at 08:36

## 2024-10-01 RX ADMIN — SODIUM BICARBONATE 1300 MG: 650 TABLET, ORALLY DISINTEGRATING ORAL at 16:24

## 2024-10-01 RX ADMIN — MAGNESIUM SULFATE HEPTAHYDRATE 2 G: 40 INJECTION, SOLUTION INTRAVENOUS at 14:49

## 2024-10-01 RX ADMIN — AMLODIPINE BESYLATE 10 MG: 10 TABLET ORAL at 20:25

## 2024-10-01 RX ADMIN — CEFTRIAXONE SODIUM 1000 MG: 1 INJECTION, POWDER, FOR SOLUTION INTRAMUSCULAR; INTRAVENOUS at 13:21

## 2024-10-01 RX ADMIN — ASPIRIN 81 MG: 81 TABLET, COATED ORAL at 20:25

## 2024-10-01 NOTE — PLAN OF CARE
Goal Outcome Evaluation:              Outcome Evaluation: New admit from ER, Pt is A&Ox3, pt had two small runs of SVT, EKG ordered but pt converted out of it by the time it came. VSS, Will continue plan of care.

## 2024-10-01 NOTE — PROGRESS NOTES
"Daily progress note    Primary care physician  Dr. Bauer    Subjective  Awake and alert and feeling little better than yesterday with no new complaints and family at bedside.    History of present illness  84-year-old female with history of hypertension and dementia who lives by herself presented to [facility] emergency room with left flank pain for last few days which is getting worse.  Patient also complained of nausea but no vomiting diarrhea.  Patient also denies any fever chills chest pain shortness of breath palpitation.  Patient workup in ER revealed acute kidney injury and left cystic renal mass admitted for management.      REVIEW OF SYSTEMS  All systems reviewed and negative except for those discussed in HPI.     PHYSICAL EXAM   Blood pressure 140/51, pulse 95, temperature 98.2 °F (36.8 °C), temperature source Oral, resp. rate 18, height 160 cm (63\"), weight 72.9 kg (160 lb 11.5 oz), SpO2 98%.    GENERAL: Alert well-appearing female no obvious distress.   SKIN: Warm, dry  HENT: Normocephalic, atraumatic  EYES: no scleral icterus  CV: regular rhythm, regular rate  RESPIRATORY: normal effort, moving air bilaterally  ABDOMEN: soft, nontender, nondistended bowel sounds positive  MUSCULOSKELETAL: no deformity  NEURO: alert, moves all extremities, follows commands     LAB RESULTS  Lab Results (last 24 hours)       Procedure Component Value Units Date/Time    TSH [[id number]]  (Normal) Collected: 10/01/24 0248    Specimen: Blood Updated: 10/01/24 0348     TSH 0.701 uIU/mL     Comprehensive Metabolic Panel [[id number]]  (Abnormal) Collected: 10/01/24 0248    Specimen: Blood Updated: 10/01/24 0345     Glucose 94 mg/dL      BUN 43 mg/dL      Creatinine 2.15 mg/dL      Sodium 136 mmol/L      Potassium 3.6 mmol/L      Chloride 104 mmol/L      CO2 16.4 mmol/L      Calcium 9.2 mg/dL      Total Protein 6.4 g/dL      Albumin 3.2 g/dL      ALT (SGPT) 14 U/L      AST (SGOT) 20 U/L      Alkaline Phosphatase " 156 U/L      Total Bilirubin 0.3 mg/dL      Globulin 3.2 gm/dL      A/G Ratio 1.0 g/dL      BUN/Creatinine Ratio 20.0     Anion Gap 15.6 mmol/L      eGFR 21.9 mL/min/1.73     Narrative:      GFR Normal >60  Chronic Kidney Disease <60  Kidney Failure <15    The GFR formula is only valid for adults with stable renal function between ages 18 and 70.    Lipid Panel [[id number]] Collected: 10/01/24 0248    Specimen: Blood Updated: 10/01/24 0345     Total Cholesterol 129 mg/dL      Triglycerides 94 mg/dL      HDL Cholesterol 43 mg/dL      LDL Cholesterol  68 mg/dL      VLDL Cholesterol 18 mg/dL      LDL/HDL Ratio 1.56    Narrative:      Cholesterol Reference Ranges  (U.S. Department of Health and Human Services ATP III Classifications)    Desirable          <200 mg/dL  Borderline High    200-239 mg/dL  High Risk          >240 mg/dL      Triglyceride Reference Ranges  (U.S. Department of Health and Human Services ATP III Classifications)    Normal           <150 mg/dL  Borderline High  150-199 mg/dL  High             200-499 mg/dL  Very High        >500 mg/dL    HDL Reference Ranges  (U.S. Department of Health and Human Services ATP III Classifications)    Low     <40 mg/dl (major risk factor for CHD)  High    >60 mg/dl ('negative' risk factor for CHD)        LDL Reference Ranges  (U.S. Department of Health and Human Services ATP III Classifications)    Optimal          <100 mg/dL  Near Optimal     100-129 mg/dL  Borderline High  130-159 mg/dL  High             160-189 mg/dL  Very High        >189 mg/dL    Magnesium [[id number]]  (Abnormal) Collected: 10/01/24 0248    Specimen: Blood Updated: 10/01/24 0345     Magnesium 1.5 mg/dL     Uric Acid [[id number]]  (Abnormal) Collected: 10/01/24 0248    Specimen: Blood Updated: 10/01/24 0345     Uric Acid 8.9 mg/dL     Phosphorus [[id number]]  (Normal) Collected: 10/01/24 0248    Specimen: Blood Updated: 10/01/24 0345     Phosphorus 2.9 mg/dL     Hemoglobin A1c [[id number]]  (Normal)  Collected: 10/01/24 0248    Specimen: Blood Updated: 10/01/24 0333     Hemoglobin A1C 5.00 %     Narrative:      Hemoglobin A1C Ranges:    Increased Risk for Diabetes  5.7% to 6.4%  Diabetes                     >= 6.5%  Diabetic Goal                < 7.0%    CBC & Differential [[id number]]  (Abnormal) Collected: 10/01/24 0248    Specimen: Blood Updated: 10/01/24 0320    Narrative:      The following orders were created for panel order CBC & Differential.  Procedure                               Abnormality         Status                     ---------                               -----------         ------                     CBC Auto Differential[[id number]]        Abnormal            Final result                 Please view results for these tests on the individual orders.    CBC Auto Differential [[id number]]  (Abnormal) Collected: 10/01/24 0248    Specimen: Blood Updated: 10/01/24 0320     WBC 15.88 10*3/mm3      RBC 3.27 10*6/mm3      Hemoglobin 9.4 g/dL      Hematocrit 29.8 %      MCV 91.1 fL      MCH 28.7 pg      MCHC 31.5 g/dL      RDW 12.4 %      RDW-SD 41.9 fl      MPV 11.1 fL      Platelets 270 10*3/mm3      Neutrophil % 82.6 %      Lymphocyte % 6.3 %      Monocyte % 9.2 %      Eosinophil % 0.1 %      Basophil % 0.2 %      Immature Grans % 1.6 %      Neutrophils, Absolute 13.12 10*3/mm3      Lymphocytes, Absolute 1.00 10*3/mm3      Monocytes, Absolute 1.46 10*3/mm3      Eosinophils, Absolute 0.01 10*3/mm3      Basophils, Absolute 0.03 10*3/mm3      Immature Grans, Absolute 0.26 10*3/mm3      nRBC 0.0 /100 WBC     BNP [[id number]]  (Normal) Collected: 09/30/24 2350    Specimen: Blood Updated: 10/01/24 0045     proBNP 1,009.0 pg/mL     Narrative:      This assay is used as an aid in the diagnosis of individuals suspected of having heart failure. It can be used as an aid in the diagnosis of acute decompensated heart failure (ADHF) in patients presenting with signs and symptoms of ADHF to the emergency department  (ED). In addition, NT-proBNP of <300 pg/mL indicates ADHF is not likely.    Age Range Result Interpretation  NT-proBNP Concentration (pg/mL:      <50             Positive            >450                   Gray                 300-450                    Negative             <300    50-75           Positive            >900                  Gray                300-900                  Negative            <300      >75             Positive            >1800                  Gray                300-1800                  Negative            <300    Sodium, Urine, Random - Straight Cath [[id number]] Collected: 09/30/24 1610    Specimen: Urine from Straight Cath Updated: 09/30/24 2354     Sodium, Urine 35 mmol/L     Narrative:      Reference intervals for random urine have not been established.  Clinical usage is dependent upon physician's interpretation in combination with other laboratory tests.       Creatinine Urine Random (kidney function) GFR component - Straight Cath [[id number]] Collected: 09/30/24 1610    Specimen: Urine from Straight Cath Updated: 09/30/24 2354     Creatinine, Urine 133.6 mg/dL     Narrative:      Reference intervals for random urine have not been established.  Clinical usage is dependent upon physician's interpretation in combination with other laboratory tests.       Lactic Acid, Plasma [[id number]]  (Normal) Collected: 09/30/24 1631    Specimen: Blood Updated: 09/30/24 1711     Lactate 1.5 mmol/L     Urinalysis, Microscopic Only - Straight Cath [[id number]]  (Abnormal) Collected: 09/30/24 1610    Specimen: Urine from Straight Cath Updated: 09/30/24 1641     RBC, UA 0-2 /HPF      WBC, UA Too Numerous to Count /HPF      Bacteria, UA 4+ /HPF      Squamous Epithelial Cells, UA 3-6 /HPF      Hyaline Casts, UA 13-20 /LPF      Methodology Automated Microscopy    Urinalysis With Microscopic If Indicated (No Culture) - Straight Cath [[id number]]  (Abnormal) Collected: 09/30/24 1610    Specimen: Urine from  "Straight Cath Updated: 09/30/24 1639     Color, UA Dark Yellow     Appearance, UA Turbid     pH, UA 5.5     Specific Gravity, UA 1.015     Glucose, UA Negative     Ketones, UA Trace     Bilirubin, UA Negative     Blood, UA Small (1+)     Protein, UA 30 mg/dL (1+)     Leuk Esterase, UA Large (3+)     Nitrite, UA Positive     Urobilinogen, UA 0.2 E.U./dL    Blood Culture - Blood, Arm, Left [[id number]] Collected: 09/30/24 1622    Specimen: Blood from Arm, Left Updated: 09/30/24 1639    Blood Culture - Blood, Arm, Right [[id number]] Collected: 09/30/24 1631    Specimen: Blood from Arm, Right Updated: 09/30/24 1639    Procalcitonin [[id number]]  (Abnormal) Collected: 09/30/24 1408    Specimen: Blood Updated: 09/30/24 1636     Procalcitonin 1.19 ng/mL     Narrative:      As a Marker for Sepsis (Non-Neonates):    1. <0.5 ng/mL represents a low risk of severe sepsis and/or septic shock.  2. >2 ng/mL represents a high risk of severe sepsis and/or septic shock.    As a Marker for Lower Respiratory Tract Infections that require antibiotic therapy:    PCT on Admission    Antibiotic Therapy       6-12 Hrs later    >0.5                Strongly Recommended  >0.25 - <0.5        Recommended   0.1 - 0.25          Discouraged              Remeasure/reassess PCT  <0.1                Strongly Discouraged     Remeasure/reassess PCT    As 28 day mortality risk marker: \"Change in Procalcitonin Result\" (>80% or <=80%) if Day 0 (or Day 1) and Day 4 values are available. Refer to http://www.[email or web address]-pct-calculator.com    Change in PCT <=80%  A decrease of PCT levels below or equal to 80% defines a positive change in PCT test result representing a higher risk for 28-day all-cause mortality of patients diagnosed with severe sepsis for septic shock.    Change in PCT >80%  A decrease of PCT levels of more than 80% defines a negative change in PCT result representing a lower risk for 28-day all-cause mortality of patients diagnosed with severe sepsis " or septic shock.       Comprehensive Metabolic Panel [[id number]]  (Abnormal) Collected: 09/30/24 1408    Specimen: Blood Updated: 09/30/24 1445     Glucose 109 mg/dL      BUN 53 mg/dL      Creatinine 2.97 mg/dL      Sodium 135 mmol/L      Potassium 4.0 mmol/L      Comment: Slight hemolysis detected by analyzer. Result may be falsely elevated.        Chloride 101 mmol/L      CO2 17.0 mmol/L      Calcium 9.8 mg/dL      Total Protein 7.4 g/dL      Albumin 3.6 g/dL      ALT (SGPT) 15 U/L      AST (SGOT) 22 U/L      Comment: Slight hemolysis detected by analyzer. Result may be falsely elevated.        Alkaline Phosphatase 159 U/L      Total Bilirubin 0.5 mg/dL      Globulin 3.8 gm/dL      A/G Ratio 0.9 g/dL      BUN/Creatinine Ratio 17.8     Anion Gap 17.0 mmol/L      eGFR 14.9 mL/min/1.73      Comment: <15 Indicative of kidney failure       Narrative:      GFR Normal >60  Chronic Kidney Disease <60  Kidney Failure <15    The GFR formula is only valid for adults with stable renal function between ages 18 and 70.    Lipase [[id number]]  (Normal) Collected: 09/30/24 1408    Specimen: Blood Updated: 09/30/24 1444     Lipase 14 U/L     CBC & Differential [[id number]]  (Abnormal) Collected: 09/30/24 1408    Specimen: Blood Updated: 09/30/24 1420    Narrative:      The following orders were created for panel order CBC & Differential.  Procedure                               Abnormality         Status                     ---------                               -----------         ------                     CBC Auto Differential[[id number]]        Abnormal            Final result                 Please view results for these tests on the individual orders.    CBC Auto Differential [[id number]]  (Abnormal) Collected: 09/30/24 1408    Specimen: Blood Updated: 09/30/24 1420     WBC 17.12 10*3/mm3      RBC 3.51 10*6/mm3      Hemoglobin 10.6 g/dL      Hematocrit 31.9 %      MCV 90.9 fL      MCH 30.2 pg      MCHC 33.2 g/dL      RDW 12.6 %       RDW-SD 41.0 fl      MPV 11.2 fL      Platelets 267 10*3/mm3      Neutrophil % 83.5 %      Lymphocyte % 5.4 %      Monocyte % 9.5 %      Eosinophil % 0.2 %      Basophil % 0.2 %      Immature Grans % 1.2 %      Neutrophils, Absolute 14.30 10*3/mm3      Lymphocytes, Absolute 0.93 10*3/mm3      Monocytes, Absolute 1.62 10*3/mm3      Eosinophils, Absolute 0.03 10*3/mm3      Basophils, Absolute 0.03 10*3/mm3      Immature Grans, Absolute 0.21 10*3/mm3      nRBC 0.0 /100 WBC           Imaging Results (Last 24 Hours)       Procedure Component Value Units Date/Time    US Renal Bilateral [[id number]] Collected: 09/30/24 1750     Updated: 09/30/24 1756    Narrative:      US RENAL BILATERAL-     INDICATIONS: Complex renal cyst versus mass versus infection     TECHNIQUE: ULTRASOUND OF THE KIDNEYS AND URINARY BLADDER.     COMPARISON: CT from same date     FINDINGS:     The right kidney measures 10.0 centimeters, the left kidney measures  10.6 centimeters.     Multiple left renal cysts are apparent, largest measuring 6.0 cm. No  hydronephrosis or echogenic nephrolithiasis.     The urinary bladder appears unremarkable. Bilateral ureteral jets were  observed.       Impression:      Multiple left renal cysts. No solid renal mass was identified by  ultrasound, suggest follow-up evaluation with enhanced renal imaging if  not contraindicated. No hydronephrosis or echogenic nephrolithiasis.        This report was finalized on 9/30/2024 5:53 PM by Dr. Otf Cardenas M.D on Workstation: [id number]       CT Abdomen Pelvis Without Contrast [[id number]] Collected: 09/30/24 1524     Updated: 09/30/24 1544    Narrative:      CT ABDOMEN PELVIS WO CONTRAST-     DATE OF EXAM: 9/30/2024 2:32 PM     INDICATION: Left flank pain, suspect kidney stone.     COMPARISON: Chest radiographs 8/21/2018.     TECHNIQUE: Multiple contiguous axial images were acquired through the  abdomen and pelvis without the intravenous administration of  contrast.  Reformatted coronal and sagittal sequences were also reviewed. Radiation  dose reduction techniques were utilized, including automated exposure  control and exposure modulation based on body size.     FINDINGS:  Multifocal bibasilar subsegmental atelectasis and/or scarring. Partially  imaged calcified coronary artery disease. Aortic valve calcifications.     Incompletely evaluated 1.5 cm low-density lesion in the right lobe of  the liver, statistically representing a hepatic cyst or hemangioma.  Status post cholecystectomy. The spleen is unremarkable in limited  noncontrast CT appearance. Fatty atrophy of the pancreas. Nonspecific  but possibly benign low-density nodular thickening of both adrenal  glands. Complex bilobed heterogeneous predominantly low-attenuation  masslike collection in the upper pole of the left kidney and extending  posteriorly into the retroperitoneal fat, measuring approximately 8.5 cm  x 5 cm in axial dimensions (axial series 2 image 51) and 7 cm  craniocaudal (coronal series 4 image 74). Tiny 2 mm nonobstructing  calcification in the lower pole the right kidney. The right kidney is  otherwise unremarkable in limited noncontrast CT appearance. No  obstructing renal or ureteral stone is identified. No hydronephrosis or  hydroureter. The urinary bladder is nondistended. Status post  hysterectomy. The adnexa not definitively identified.     Mild colorectal stool. Colonic diverticula, without CT evidence of  diverticulitis. Mild diffuse colonic wall thickening could be  accentuated by underdistention but could reflect a nonspecific colitis.  No bowel obstruction. The appendix is normal.     No free fluid in the abdomen or pelvis. No free intraperitoneal air. No  pathologically enlarged lymph nodes are identified, although evaluation  is mildly limited by the lack of intravenous contrast. Moderate to  severe calcified atherosclerotic disease in the abdominal aorta and its  distal  branches without aneurysm.     Mild diffuse anasarca. Rectus abdominis diastases. Anterior lower  abdominal wall surgical scar. Diffuse osteopenia. Transitional  lumbosacral vertebral anatomy with partially lumbarized S1 vertebral  segment and rudimentary S1-S2 disc space. Nonspecific sclerotic foci in  the right T10 transverse process and the right side of the sacrum at the  S4 vertebral level, possible benign bone islands. Mild to moderate  bilateral hip and SI joint DJD and mild to moderate DJD of the pubic  symphysis with chondrocalcinosis. No acute osseous abnormality is  identified.       Impression:         1. Nonspecific complex bilobed heterogeneous predominately  low-attenuation masslike collection in the upper pole of the left kidney  and extending posteriorly into the retroperitoneal fat. Findings could  represent a primary renal neoplasm or possible pyelonephritis with  intrarenal and perirenal abscess. Correlate with urinalysis. Could  consider further evaluation with focused ultrasound or dedicated pre and  postcontrast CT or MRI if clinically indicated.  2. Mild diffuse anasarca.  3. Incompletely evaluated 1.5 cm low-density lesion in the right lobe of  the liver, statistically representing a hepatic cyst or hemangioma.  Recommend attention on follow-up.  4. Sclerotic foci in the right T10 transverse process and right side of  the sacrum, possible benign bone islands. Could consider further  evaluation with routine outpatient nuclear medicine bone scan if  clinically indicated.  5. Mild diffuse colonic wall thickening could be accentuated by  underdistention but could reflect a nonspecific colitis.     This report was finalized on 2024 3:41 PM by Dom Dalton MD on  Workstation: [id number]               Current Facility-Administered Medications:     amiodarone 150 mg in 100 mL D5W (loading dose), 150 mg, Intravenous, Once **FOLLOWED BY** [] amiodarone 360 mg in 200 mL D5W infusion, 1  mg/min, Intravenous, Continuous **FOLLOWED BY** amiodarone 360 mg in 200 mL D5W infusion, 0.5 mg/min, Intravenous, Continuous, Braden James MD    amLODIPine (NORVASC) tablet 10 mg, 10 mg, Oral, Nightly, Latonia Rush MD    aspirin EC tablet 81 mg, 81 mg, Oral, Nightly, Latonia Rush MD    cefTRIAXone (ROCEPHIN) 1,000 mg in sodium chloride 0.9 % 100 mL MBP, 1,000 mg, Intravenous, Q24H, Latonia Rush MD, Last Rate: 200 mL/hr at 10/01/24 1321, 1,000 mg at 10/01/24 1321    famotidine (PEPCID) tablet 20 mg, 20 mg, Oral, BID, Latonia Rush MD, 20 mg at 10/01/24 0836    magnesium sulfate 2g/50 mL (PREMIX) infusion, 2 g, Intravenous, Once, Kerline Elaine APRN    memantine (NAMENDA) tablet 10 mg, 10 mg, Oral, Q12H, Latonia Rush MD, 10 mg at 10/01/24 0836    polycarbophil tablet 625 mg, 625 mg, Oral, Daily, Latonia Rush MD, 625 mg at 10/01/24 0836    potassium chloride (K-DUR,KLOR-CON) ER tablet 40 mEq, 40 mEq, Oral, Once, Gomez Mesa MD    rivastigmine (EXELON) capsule 6 mg, 6 mg, Oral, BID With Meals, Latonia Rush MD, 6 mg at 10/01/24 0836    sodium bicarbonate tablet 1,300 mg, 1,300 mg, Oral, TID, Gomez Mesa MD    [COMPLETED] Insert Peripheral IV, , , Once **AND** sodium chloride 0.9 % flush 10 mL, 10 mL, Intravenous, PRN, AlbertoBaldo MD    temazepam (RESTORIL) capsule 30 mg, 30 mg, Oral, Nightly PRN, Latonia Rush MD     ASSESSMENT  Acute kidney injury  Left cystic renal mass  Supplemented tachycardia  Acute UTI  Hypertension  Dementia    PLAN  CPM  Continue IVF  Continue IV antibiotics  Control the heart rate  OFF Avapro  Cardiology consult pending  Nephrology and urology to follow patient  Adjust home medications  Stress ulcer DVT prophylaxis  Supportive care  PT/OT  Discussed with family nursing staff  Follow closely further recommendation current hospital course    LATONIA RUSH MD    Copied text in this note has been reviewed and is accurate as of 10/01/24

## 2024-10-01 NOTE — THERAPY EVALUATION
Patient Name: Anita Frankel  : 1940    MRN: [id number]                              Today's Date: 10/1/2024       Admit Date: 2024    Visit Dx:     ICD-10-CM ICD-9-CM   1. Left flank pain  R10.9 789.09   2. Acute renal failure, unspecified acute renal failure type  N17.9 584.9   3. Leukocytosis, unspecified type  D72.829 288.60   4. Abnormal CT of the abdomen  R93.5 793.6   5. SHARON (acute kidney injury)  N17.9 584.9     Patient Active Problem List   Diagnosis    SHARON (acute kidney injury)     Past Medical History:   Diagnosis Date    Hypertension     Kidney stone      Past Surgical History:   Procedure Laterality Date    ABDOMINAL SURGERY      polyp removal    CHOLECYSTECTOMY      HYSTERECTOMY      TONSILLECTOMY        General Information       Row Name 10/01/24 1254          OT Time and Intention    Document Type evaluation  -MM     Mode of Treatment co-treatment;physical therapy;occupational therapy  -MM       Row Name 10/01/24 1254          General Information    Patient Profile Reviewed yes  -MM     Prior Level of Function independent:;ADL's;transfer;all household mobility  no AD used, no hx falls  -MM     Existing Precautions/Restrictions fall  -MM     Barriers to Rehab --  hx dementia  -MM       Row Name 10/01/24 1254          Living Environment    People in Home alone  -MM       Row Name 10/01/24 1254          Home Main Entrance    Number of Stairs, Main Entrance two  -MM       Row Name 10/01/24 1254          Cognition    Orientation Status (Cognition) oriented x 3  -MM       Row Name 10/01/24 1254          Safety Issues, Functional Mobility    Impairments Affecting Function (Mobility) pain;balance;endurance/activity tolerance;strength  -MM     Comment, Safety Issues/Impairments (Mobility) Co-treatment medically necessary and appropriate d/t pt's acuity level, decreased endurance and activity tolerance, and safety of patient and staff. Treatment focused on progression of care and goals established  in POC. Gait belt and non-skid socks worn.  -MM               User Key  (r) = Recorded By, (t) = Taken By, (c) = Cosigned By      Initials Name Provider Type    MM Jackie Moya OT Occupational Therapist                     Mobility/ADL's       Row Name 10/01/24 1254          Bed Mobility    Bed Mobility supine-sit;sit-supine  -MM     Supine-Sit [place] (Bed Mobility) contact guard  -MM     Sit-Supine [place] (Bed Mobility) standby assist  -MM     Assistive Device (Bed Mobility) bed rails;head of bed elevated  -MM       Row Name 10/01/24 1254          Transfers    Transfers sit-stand transfer;stand-sit transfer;toilet transfer  -MM       Row Name 10/01/24 1254          Sit-Stand Transfer    Sit-Stand [place] (Transfers) contact guard  -MM     Comment, (Sit-Stand Transfer) once standing, requesting use of rwx for increased stability  -MM       Row Name 10/01/24 1254          Toilet Transfer    Type (Toilet Transfer) stand-sit;sit-stand  -MM     [place] Level (Toilet Transfer) contact guard  -MM     Assistive Device (Toilet Transfer) commode;grab bars/safety frame;walker, front-wheeled  -MM     Comment, (Toilet Transfer) vc's for use of grab bars  -MM       Row Name 10/01/24 1254          Functional Mobility    Functional Mobility- Ind. Level contact guard assist  -MM     Functional Mobility- Comment household distances, to BR commode  -MM       Row Name 10/01/24 1254          Activities of Daily Living    BADL Assessment/Intervention lower body dressing;toileting  -MM       Row Name 10/01/24 1254          Lower Body Dressing Assessment/Training    [place] Level (Lower Body Dressing) don;socks;minimum assist (75% patient effort)  -MM       Row Name 10/01/24 1254          Toileting Assessment/Training    Comment, (Toileting) hygiene while seated on commode SPV, brief mgment SBA/CGA in sitting and standing  -MM               User Key  (r) = Recorded By, (t) = Taken By, (c) = Cosigned By       Initials Name Provider Type    MM Jackie Moya OT Occupational Therapist                   Obj/Interventions       Row Name 10/01/24 1256          Sensory Assessment (Somatosensory)    Sensory Assessment (Somatosensory) UE sensation intact  -MM       Row Name 10/01/24 1256          Vision Assessment/Intervention    Visual Impairment/Limitations WFL  -MM       Row Name 10/01/24 1256          Range of Motion Comprehensive    General Range of Motion bilateral upper extremity ROM WNL  -MM       Row Name 10/01/24 1256          Strength Comprehensive (MMT)    General Manual Muscle Testing (MMT) Assessment upper extremity strength deficits identified  -MM     Comment, General Manual Muscle Testing (MMT) Assessment generalized weakness, BUE grossly 4-/5  -MM       Row Name 10/01/24 1256          Motor Skills    Motor Skills functional endurance  -MM     Functional Endurance fair  -MM       Row Name 10/01/24 1256          Balance    Balance Assessment sitting static balance;sitting dynamic balance;sit to stand dynamic balance;standing dynamic balance;standing static balance  -MM     Static Sitting Balance standby assist  -MM     Dynamic Sitting Balance standby assist  -MM     Position, Sitting Balance sitting edge of bed  -MM     Sit to Stand Dynamic Balance contact guard  -MM     Static Standing Balance contact guard  -MM     Dynamic Standing Balance contact guard  -MM     Position/Device Used, Standing Balance supported;walker, front-wheeled  -MM     Balance Interventions sitting;standing;sit to stand;supported;static;dynamic;minimal challenge;occupation based/functional task;weight shifting activity  -MM     Comment, Balance no overt LOBs  -MM               User Key  (r) = Recorded By, (t) = Taken By, (c) = Cosigned By      Initials Name Provider Type    Jackie Ellison OT Occupational Therapist                   Goals/Plan       Row Name 10/01/24 1259          Transfer Goal 1 (OT)    Activity/Assistive Device  (Transfer Goal 1, OT) transfers, all  -MM     [place] Level/Cues Needed (Transfer Goal 1, OT) modified independence  -MM     Time Frame (Transfer Goal 1, OT) short term goal (STG);2 weeks  -MM     Progress/Outcome (Transfer Goal 1, OT) goal ongoing  -MM       Row Name 10/01/24 1259          Dressing Goal 1 (OT)    Activity/Device (Dressing Goal 1, OT) dressing skills, all  -MM     [place]offee/Cues Needed (Dressing Goal 1, OT) modified independence  -MM     Time Frame (Dressing Goal 1, OT) short term goal (STG);2 weeks  -MM     Progress/Outcome (Dressing Goal 1, OT) goal ongoing  -MM       Row Name 10/01/24 1259          Toileting Goal 1 (OT)    Activity/Device (Toileting Goal 1, OT) toileting skills, all  -MM     [place] Level/Cues Needed (Toileting Goal 1, OT) modified independence  -MM     Time Frame (Toileting Goal 1, OT) short term goal (STG);2 weeks  -MM     Progress/Outcome (Toileting Goal 1, OT) goal ongoing  -MM       Row Name 10/01/24 1257          Grooming Goal 1 (OT)    Activity/Device (Grooming Goal 1, OT) grooming skills, all  -MM     Coffee (Grooming Goal 1, OT) modified independence  -MM     Time Frame (Grooming Goal 1, OT) short term goal (STG);2 weeks  -MM     Progress/Outcome (Grooming Goal 1, OT) goal ongoing  -MM       Row Name 10/01/24 125          Therapy Assessment/Plan (OT)    Planned Therapy Interventions (OT) activity tolerance training;neuromuscular control/coordination retraining;patient/caregiver education/training;transfer/mobility retraining;strengthening exercise;ROM/therapeutic exercise;occupation/activity based interventions;functional balance retraining  -MM               User Key  (r) = Recorded By, (t) = Taken By, (c) = Cosigned By      Initials Name Provider Type    Jackie Ellison OT Occupational Therapist                   Clinical Impression       Row Name 10/01/24 1252          Pain Assessment    Pretreatment Pain Rating 0/10 - no pain  -MM      Posttreatment Pain Rating 0/10 - no pain  -MM       Row Name 10/01/24 1256          Plan of Care Review    Plan of Care Reviewed With patient;daughter  -MM     Progress no change  -MM     Outcome Evaluation Pt is an 85 yo female admitted for L flank pain and nausea. She is seen this date for OT eval, she lives in [facility] alone, (I) with ADLs, no AD used prior to admission. She reports she feels weaker than normal, presenting with generalized weakness and decreased activity tolerance. She requires CGA for STS and functional mobility using rwx this date. She does not own rwx at this time. BR commode transfer with CGA, SBA for hygiene, CGA for brief mgment while standing. She will continue to benefit from skilled OT to address noted functional deficits and progress toward prior level of (I). Anticipate home at d/c.  -MM       Row Name 10/01/24 1256          Therapy Assessment/Plan (OT)    Rehab Potential (OT) good, to achieve stated therapy goals  -MM     Criteria for Skilled Therapeutic Interventions Met (OT) skilled treatment is necessary  -MM     Therapy Frequency (OT) 3 times/wk  -MM       Row Name 10/01/24 1256          Therapy Plan Review/Discharge Plan (OT)    Anticipated Discharge Disposition (OT) home  -MM       Row Name 10/01/24 1256          Vital Signs    O2 Delivery Pre Treatment room air  -MM       Row Name 10/01/24 1256          Positioning and Restraints    Pre-Treatment Position in bed  -MM     Post Treatment Position bed  -MM     In Bed notified nsg;fowlers;call light within reach;encouraged to call for assist;exit alarm on;with family/caregiver;side rails up x3  -MM               User Key  (r) = Recorded By, (t) = Taken By, (c) = Cosigned By      Initials Name Provider Type    Jackie Ellison OT Occupational Therapist                   Outcome Measures       Row Name 10/01/24 1257          How much help from another is currently needed...    Putting on and taking off regular lower body clothing? 3   -MM     Bathing (including washing, rinsing, and drying) 3  -MM     Toileting (which includes using toilet bed pan or urinal) 3  -MM     Putting on and taking off regular upper body clothing 3  -MM     Taking care of personal grooming (such as brushing teeth) 3  -MM     Eating meals 4  -MM     AM-PAC 6 Clicks Score (OT) 19  -MM       Row Name 10/01/24 1007 10/01/24 0800       How much help from another person do you currently need...    Turning from your back to your side while in flat bed without using bedrails? 3  -EF (r) CK (t) EF (c) 3  -RF    Moving from lying on back to sitting on the side of a flat bed without bedrails? 3  -EF (r) CK (t) EF (c) 3  -RF    Moving to and from a bed to a chair (including a wheelchair)? 3  -EF (r) CK (t) EF (c) 3  -RF    Standing up from a chair using your arms (e.g., wheelchair, bedside chair)? 3  -EF (r) CK (t) EF (c) 3  -RF    Climbing 3-5 steps with a railing? 3  -EF (r) CK (t) EF (c) 3  -RF    To walk in hospital room? 3  -EF (r) CK (t) EF (c) 3  -RF    AM-PAC 6 Clicks Score (PT) 18  -EF (r) CK (t) 18  -RF    Highest Level of Mobility Goal 6 --> Walk 10 steps or more  -EF (r) CK (t) 6 --> Walk 10 steps or more  -RF      Row Name 10/01/24 1259          Modified [place] Scale    Modified [place] Scale 3 - Moderate disability.  Requiring some help, but able to walk without assistance.  -MM       Row Name 10/01/24 1259          Functional Assessment    Outcome Measure Options AM-PAC 6 Clicks Daily Activity (OT);Modified [place]  -MM               User Key  (r) = Recorded By, (t) = Taken By, (c) = Cosigned By      Initials Name Provider Type    EF Angelina Mills, PT Physical Therapist    MM Jackie Moya, OT Occupational Therapist    RF Chhaya Weeks RN Registered Nurse    Kiersten Ching, PT Student PT Student                    Occupational Therapy Education       Title: PT OT SLP Therapies (Done)       Topic: Occupational Therapy (Done)       Point: ADL training (Done)        Description:   Instruct learner(s) on proper safety adaptation and remediation techniques during self care or transfers.   Instruct in proper use of assistive devices.                  Learning Progress Summary             Patient Acceptance, E, VU by MM at 10/1/2024 1300    Comment: role of OT                         Point: Precautions (Done)       Description:   Instruct learner(s) on prescribed precautions during self-care and functional transfers.                  Learning Progress Summary             Patient Acceptance, E, VU by MM at 10/1/2024 1300    Comment: role of OT                         Point: Body mechanics (Done)       Description:   Instruct learner(s) on proper positioning and spine alignment during self-care, functional mobility activities and/or exercises.                  Learning Progress Summary             Patient Acceptance, E, VU by MM at 10/1/2024 1300    Comment: role of OT                                         User Key       Initials Effective Dates Name Provider Type Discipline    DARLYN 05/31/24 -  Jackie Moya OT Occupational Therapist OT                  OT Recommendation and Plan  Planned Therapy Interventions (OT): activity tolerance training, neuromuscular control/coordination retraining, patient/caregiver education/training, transfer/mobility retraining, strengthening exercise, ROM/therapeutic exercise, occupation/activity based interventions, functional balance retraining  Therapy Frequency (OT): 3 times/wk  Plan of Care Review  Plan of Care Reviewed With: patient, daughter  Progress: no change  Outcome Evaluation: Pt is an 85 yo female admitted for L flank pain and nausea. She is seen this date for OT krupa, she lives in [facility] alone, (I) with ADLs, no AD used prior to admission. She reports she feels weaker than normal, presenting with generalized weakness and decreased activity tolerance. She requires CGA for STS and functional mobility using rwx this date. She does not  own rwx at this time. BR commode transfer with CGA, SBA for hygiene, CGA for brief mgment while standing. She will continue to benefit from skilled OT to address noted functional deficits and progress toward prior level of (I). Anticipate home at d/c.     Time Calculation:   Evaluation Complexity (OT)  Review Occupational Profile/Medical/Therapy History Complexity: brief/low complexity  Assessment, Occupational Performance/Identification of Deficit Complexity: 1-3 performance deficits  Clinical Decision Making Complexity (OT): problem focused assessment/low complexity  Overall Complexity of Evaluation (OT): low complexity     Time Calculation- OT       Row Name 10/01/24 1300             Time Calculation- OT    OT Start Time 0929  -MM      OT Stop Time 0948  -MM      OT Time Calculation (min) 19 min  -MM      Total Timed Code Minutes- OT 12 minute(s)  -MM      OT Received On 10/01/24  -MM      OT - Next Appointment 10/03/24  -MM      OT Goal Re-Cert Due Date 10/15/24  -MM         Timed Charges    96845 - OT Self Care/Mgmt Minutes 12  -MM         Untimed Charges    OT Eval/Re-eval Minutes 7  -MM         Total Minutes    Timed Charges Total Minutes 12  -MM      Untimed Charges Total Minutes 7  -MM       Total Minutes 19  -MM                User Key  (r) = Recorded By, (t) = Taken By, (c) = Cosigned By      Initials Name Provider Type    MM Jackie Moya OT Occupational Therapist                  Therapy Charges for Today       Code Description Service Date Service Provider Modifiers Qty    29231163046  OT SELF CARE/MGMT/TRAIN EA 15 MIN 10/1/2024 Jackie Moya OT GO 1    [id number] HC OT EVAL LOW COMPLEXITY 2 10/1/2024 Jackie Moya OT GO 1                 Jackie Moya OT  10/1/2024

## 2024-10-01 NOTE — PLAN OF CARE
Goal Outcome Evaluation:              Outcome Evaluation: Pt is an 83 y/o female admitted on 9/30/2024 for L flank pain and nausea. Prior to admission, pt lives in a condo by herself w/2 [address] from outside and uses elevator to reach her floor. No prior AD use & I w/ADLs. Pt is currently A&Ox3 w/no c/o pain. Upon exam, presents with decreased strength and endurance. Reports she is feeling weaker than normal. Pt able to complete BM SBA, STS CGA, and ambulate 50' CGA w/rwx. Pt would benefit from skilled therapy to increase deficits noted above to return to baseline function w/mobility. Rec D/C to home w/HH vs. home pending progress.      Anticipated Discharge Disposition (PT): home with home health, home

## 2024-10-01 NOTE — PROGRESS NOTES
[facility] Progress Note      Patient Name: Anita Frankel  : 1940  MRN: [id number]  Primary Care Physician:  Rowan Bauer MD  Date of admission: 2024    Subjective     Interval History:   Follow-up on SHARON on CKD  Low back pain less than yesterday; still no urinary complaints  No chest pain, SOA, or N/V  Tolerating clear liquid without issues.  Had 1 episode of SVT last night with HR 160s, resolved spontaneously   UOP not fully quantified; 250 mL in canister    Review of Systems:   As noted above    Objective     Vitals:   Temp:  [98.2 °F (36.8 °C)-100.4 °F (38 °C)] 98.2 °F (36.8 °C)  Heart Rate:  [] 95  Resp:  [18] 18  BP: (130-173)/(51-93) 140/51    Intake/Output Summary (Last 24 hours) at 10/1/2024 1358  Last data filed at 2024 1900  Gross per 24 hour   Intake 1100 ml   Output 100 ml   Net 1000 ml       Physical Exam:    General Appearance: alert, not oriented, NAD  Skin: warm and dry  HEENT: oral mucosa normal, nonicteric sclera, mucous membrane moist  Neck: supple, no JVD  Lungs: Bibasilar crackles, nonlabored respiration on RA  Heart: Irregularly irregular, tachycardic, no murmur, no rub  Abdomen: soft, nontender, nondistended, + BS  : no palpable bladder  Extremities: Trace doughy BLE edema, no cyanosis or clubbing  Neuro: normal speech and mental status     Scheduled Meds:     amiodarone, 150 mg, Intravenous, Once  amLODIPine, 10 mg, Oral, Nightly  aspirin, 81 mg, Oral, Nightly  cefTRIAXone, 1,000 mg, Intravenous, Q24H  famotidine, 20 mg, Oral, BID  magnesium sulfate, 2 g, Intravenous, Once  memantine, 10 mg, Oral, Q12H  polycarbophil, 625 mg, Oral, Daily  potassium chloride, 40 mEq, Oral, Once  rivastigmine, 6 mg, Oral, BID With Meals  sodium bicarbonate, 1,300 mg, Oral, TID      IV Meds:   amiodarone, 0.5 mg/min        Results Reviewed:   I have personally reviewed the results from the time of this admission to 10/1/2024 13:58 EDT      Results from last 7 days   Lab Units 10/01/24  0248 09/30/24  1408   SODIUM mmol/L 136 135*   POTASSIUM mmol/L 3.6 4.0   CHLORIDE mmol/L 104 101   CO2 mmol/L 16.4* 17.0*   BUN mg/dL 43* 53*   CREATININE mg/dL 2.15* 2.97*   CALCIUM mg/dL 9.2 9.8   BILIRUBIN mg/dL 0.3 0.5   ALK PHOS U/L 156* 159*   ALT (SGPT) U/L 14 15   AST (SGOT) U/L 20 22   GLUCOSE mg/dL 94 109*       Estimated Creatinine Clearance: 18 mL/min (A) (by C-G formula based on SCr of 2.15 mg/dL (H)).    Results from last 7 days   Lab Units 10/01/24  0248   MAGNESIUM mg/dL 1.5*   PHOSPHORUS mg/dL 2.9       Results from last 7 days   Lab Units 10/01/24  0248   URIC ACID mg/dL 8.9*       Results from last 7 days   Lab Units 10/01/24  0248 09/30/24  1408   WBC 10*3/mm3 15.88* 17.12*   HEMOGLOBIN g/dL 9.4* 10.6*   PLATELETS 10*3/mm3 270 267             Assessment / Plan     ASSESSMENT:  SHARON on likely CKD, improving:  prerenal from decline in oral intake, use of irbesartan limiting renal autoregulation, and possible infection in the urinary tract. Hypovolemia resolved; has a few bibasilar crackles now; low-normal potassium; low mag; likely NAGMA.  Difficult to interpret urine given squamous cells.  Despite significant pyuria and bacteriuria, she has no urinary complaints other than low back pain  UTI/left renal cystic mass by CT scan, continue IV antibiotics, urology not planning any intervention for now  Dementia  Hypertension, BP acceptable on amlodipine 10 mg  SVT, resolved without amiodarone drip, awaiting cardiology evaluation  Anemia       PLAN:  Discontinue IVF (done); encouraged her to eat  Replace magnesium and potassium  Increase sodium bicarbonate to 1300 mg TID  Surveillance labs    Thank you for involving us in the care of Anita Frankel.  Please feel free to call with any questions.    Gomez Mesa MD  10/01/24  13:58 EDT    [facility]  [phone]    Please note that portions of this note were completed  with a voice recognition program.

## 2024-10-01 NOTE — CONSULTS
[facility] Consult Note      Patient Name: Anita Frankel  : 1940  MRN: [id number]  Primary Care Physician:  Rowan Bauer MD  Referring Physician: Roger Rush MD  Date of admission: 2024    Subjective     Reason for Consult:  SHARON on likely CKD    HPI:   Anita Frankel is a 84 y.o. female with history of kidney disease, admitted today for further evaluation of worsening low-back pain, center-left, for the past 5 days.  On arrival, found to have low-grade fever, tachycardia, and stable blood pressure.  SCR found to be 3.0; CT scan of the abdomen pelvis found a complex heterogeneous mass-like collection in the upper pole of the left kidney; primary renal neoplasm versus possible pyelonephritis suggested by CT scan.  UA: Turbid, TNTC WBCs, 4+ bacteria, but also squamous cells.  PMH notable for dementia and hypertension on Avapro.    No dysuria, frequency, urgency, or gross hematuria; denies any color change to urine  No fever at home  Appetite usually good, but was poor for the past 2 days, per family  No significant cognitive changes over the last week, per family  No shortness of breath, chest pain, orthopnea, or PND; no leg swelling  Weight has been fairly stable over the course of this year, per family    Review of Systems:   14 point review of systems is otherwise negative except for mentioned above on HPI    Personal History     Past Medical History:   Diagnosis Date    Hypertension     Kidney stone        Past Surgical History:   Procedure Laterality Date    ABDOMINAL SURGERY      polyp removal    CHOLECYSTECTOMY      HYSTERECTOMY      TONSILLECTOMY         Family History: family history is not on file.    Social History:  reports that she has never smoked. She has never used smokeless tobacco. She reports current alcohol use. She reports that she does not use drugs.    Home Medications:  Prior to Admission medications    Medication Sig Start Date End Date  Taking? Authorizing Provider   amLODIPine (NORVASC) 10 MG tablet Take 1 tablet by mouth Every Night.   Yes Benedicto Noonan MD   aspirin 81 MG EC tablet Take 1 tablet by mouth Every Night.   Yes Benedicto Noonan MD   B Complex-C (SUPER B COMPLEX PO) Take 1 tablet by mouth Daily.   Yes Benedicto Noonan MD   calcium polycarbophil (FIBER LAXATIVE) 625 MG tablet Take 1 tablet by mouth Daily.   Yes Benedicto Noonan MD   cholecalciferol (VITAMIN D3) 1000 units tablet Take 2 tablets by mouth Daily.   Yes Benedicto Noonan MD   Cyanocobalamin (B-12 PO) Take 1 tablet by mouth Every Night.   Yes Benedicto Noonan MD   desloratadine (CLARINEX REDITAB) 5 MG disintegrating tablet Take 1 tablet by mouth Every Morning.   Yes Benedicto Noonan MD   ferrous sulfate 325 (65 FE) MG tablet Take 1 tablet by mouth Every Night.   Yes Benedicto Noonan MD   irbesartan (AVAPRO) 300 MG tablet Take 1 tablet by mouth Every Night.   Yes Benedicto Noonan MD   memantine (NAMENDA) 10 MG tablet Take 1 tablet by mouth 2 (Two) Times a Day.   Yes Benedicto Noonan MD   rivastigmine (EXELON) 6 MG capsule Take 1 capsule by mouth 2 (Two) Times a Day.   Yes Benedicto Noonan MD   temazepam (RESTORIL) 30 MG capsule Take 1 capsule by mouth At Night As Needed for Sleep.   Yes Benedicto Noonan MD   atenolol (TENORMIN) 100 MG tablet Take 1 tablet by mouth 2 (Two) Times a Day.    Benedicto Noonan MD   hydrALAZINE (APRESOLINE) 50 MG tablet Take 1 tablet by mouth 2 (Two) Times a Day.    Benedicto Noonan MD   traMADol (ULTRAM) 50 MG tablet Take 1 tablet by mouth 2 (Two) Times a Day As Needed for Moderate Pain.    Benedicto Noonan MD       Allergies:  No Known Allergies    Objective     Vitals:   Temp:  [96.4 °F (35.8 °C)] 96.4 °F (35.8 °C)  Heart Rate:  [] 100  Resp:  [16] 16  BP: (136-152)/(67-93) 141/67    Intake/Output Summary (Last 24 hours) at 9/30/2024 2047  Last data filed at  9/30/2024 1900  Gross per 24 hour   Intake 1100 ml   Output 100 ml   Net 1000 ml       Physical Exam:   Constitutional: Awake, alert, NAD; cannot identify month or year  HEENT: Sclera anicteric, no conjunctival injection  Neck: Supple, no carotid bruit, trachea at midline, no JVD  Respiratory: Clear to auscultation bilaterally, nonlabored respiration on RA  Cardiovascular: RR, tachycardic, no murmur  Gastrointestinal: BS +, soft, nontender and nondistended  : No palpable bladder  Musculoskeletal: Trace doughy edema, no clubbing or cyanosis  Psychiatric: Appropriate affect, cooperative; not oriented  Neurologic: No asterixis, moving all extremities, normal speech   Skin: Warm and dry       Scheduled Meds:     amLODIPine, 10 mg, Oral, Nightly  aspirin, 81 mg, Oral, Nightly  [START ON 10/1/2024] cefTRIAXone, 1,000 mg, Intravenous, Q24H  famotidine, 20 mg, Oral, BID  memantine, 10 mg, Oral, Q12H  [START ON 10/1/2024] polycarbophil, 625 mg, Oral, Daily  rivastigmine, 6 mg, Oral, BID With Meals  sodium bicarbonate, 650 mg, Oral, TID      IV Meds:   sodium chloride, 75 mL/hr, Last Rate: 75 mL/hr (09/30/24 1911)        Results Reviewed:   I have personally reviewed the results from the time of this admission to 9/30/2024 20:47 EDT     Lab Results   Component Value Date    GLUCOSE 109 (H) 09/30/2024    CALCIUM 9.8 09/30/2024     (L) 09/30/2024    K 4.0 09/30/2024    CO2 17.0 (L) 09/30/2024     09/30/2024    BUN 53 (H) 09/30/2024    CREATININE 2.97 (H) 09/30/2024    EGFRIFNONA 35 (L) 08/21/2018    BCR 17.8 09/30/2024    ANIONGAP 17.0 (H) 09/30/2024      Lab Results   Component Value Date    ALBUMIN 3.6 09/30/2024     US Renal Bilateral    Result Date: 9/30/2024  US RENAL BILATERAL-  INDICATIONS: Complex renal cyst versus mass versus infection  TECHNIQUE: ULTRASOUND OF THE KIDNEYS AND URINARY BLADDER.  COMPARISON: CT from same date  FINDINGS:  The right kidney measures 10.0 centimeters, the left kidney measures  10.6 centimeters.  Multiple left renal cysts are apparent, largest measuring 6.0 cm. No hydronephrosis or echogenic nephrolithiasis.  The urinary bladder appears unremarkable. Bilateral ureteral jets were observed.      Impression: Multiple left renal cysts. No solid renal mass was identified by ultrasound, suggest follow-up evaluation with enhanced renal imaging if not contraindicated. No hydronephrosis or echogenic nephrolithiasis.   This report was finalized on 9/30/2024 5:53 PM by Dr. Otf Cardenas M.D on Workstation: [employer]        Assessment / Plan       SHARON (acute kidney injury)      ASSESSMENT:  1.  SHARON versus SHARON/CKD:  suspect abnormal renal function at baseline given previously abnormal serum creatinine levels several years ago.  Family also was told that patient has abnormal kidney function.  Likely prerenal from decline in oral intake, use of a irbesartan limiting renal autoregulation, and possible infection in the urinary tract.  Looks dry; stable potassium.  Likely hypovolemic hyponatremia and NAGMA.  Difficult to interpret urine given squamous cells.  Despite significant pyuria and bacteriuria, she has no urinary complaints other than low back pain  2.  Left renal cystic mass by CT scan; urology evaluating  3.  Dementia  4.  Hypertension  5.  SVT  6.  Anemia    PLAN:  1.  IVF and empiric antibiotics; follow-up culture  2.   evaluation underway  3.  FENa  4.  Surveillance labs    Thank you for involving us in the care of Anita Frankel.  Please feel free to call with any questions.    Gomez Mesa MD  09/30/24  20:47 EDT    [facility]  [phone]      Please note that portions of this note were completed with a voice recognition program.

## 2024-10-01 NOTE — CASE MANAGEMENT/SOCIAL WORK
Discharge Planning Assessment  [facility]     Patient Name: Anita Frankel  MRN: [id number]  Today's Date: 10/1/2024    Admit Date: 9/30/2024    Plan: Home with family to transport   Discharge Needs Assessment       Row Name 10/01/24 1530       Living Environment    People in Home alone    Current Living Arrangements condominium    Potentially Unsafe Housing Conditions none    In the past 12 months has the electric, gas, oil, or water company threatened to shut off services in your home? No    Primary Care Provided by self    Provides Primary Care For no one    Family Caregiver if Needed child(susanna), adult    Quality of Family Relationships helpful;involved;supportive    Able to Return to Prior Arrangements yes       Resource/Environmental Concerns    Resource/Environmental Concerns none    Transportation Concerns none       Transportation Needs    In the past 12 months, has lack of transportation kept you from medical appointments or from getting medications? no    In the past 12 months, has lack of transportation kept you from meetings, work, or from getting things needed for daily living? No       Food Insecurity    Within the past 12 months, you worried that your food would run out before you got the money to buy more. Never true    Within the past 12 months, the food you bought just didn't last and you didn't have money to get more. Never true       Transition Planning    Patient/Family Anticipates Transition to home with family    Patient/Family Anticipated Services at Transition none    Transportation Anticipated family or friend will provide       Discharge Needs Assessment    Readmission Within the Last 30 Days no previous admission in last 30 days    Equipment Currently Used at Home none    Concerns to be Addressed denies needs/concerns at this time;discharge planning    Anticipated Changes Related to Illness none    Equipment Needed After Discharge none                   Discharge Plan       Row Name  10/01/24 1528       Plan    Plan Home with family to transport    Roadmap to Recovery Yes    Patient/Family in Agreement with Plan yes    Provided Post Acute Provider List? N/A    N/A Provider List Comment Denies need    Provided Post Acute Provider Quality & Resource List? N/A    N/A Quality & Resource List Comment Denies need    Plan Comments Spoke with patient's daughter at bedside. Introduced self and explained CCP role. Verified face sheet and local pharmacy is [facility]. Patient lives alone in 2nd floor condo with 2 [address]CE[address] and an elevator. Family denies HH, SNF, and DME history. States patient is IADL and family provides transport. Daughter states that discharge plan is for patient to return home or will go to daughter's house if needed. Family will transport.                  Continued Care and Services - Admitted Since 9/30/2024    No active coordination exists for this encounter.          Demographic Summary       Row Name 10/01/24 1528       General Information    Admission Type inpatient    Required Notices Provided Important Message from Medicare    Referral Source admission list    Reason for Consult discharge planning    Preferred Language English                   Functional Status       Row Name 10/01/24 1529       Functional Status    Usual Activity Tolerance good    Current Activity Tolerance moderate       Functional Status, IADL    Medications independent    Meal Preparation independent    Housekeeping independent    Laundry independent    Shopping assistive person       Mental Status    General Appearance WDL WDL       Employment/    Employment Status retired                   Psychosocial       Row Name 10/01/24 1530       Values/Beliefs    Spiritual, Cultural Beliefs, Anglican Practices, Values that Affect Care no       Behavior WDL    Behavior WDL WDL       Mental Health    Little Interest or Pleasure in Doing Things 0-->not at all    Feeling Down, Depressed or Hopeless 0-->not at all                    Abuse/Neglect    No documentation.                  Legal    No documentation.                  Substance Abuse    No documentation.                  Patient Forms    No documentation.                     Chhaya Borrego RN

## 2024-10-01 NOTE — PLAN OF CARE
Goal Outcome Evaluation:  Plan of Care Reviewed With: patient, daughter        Progress: no change  Outcome Evaluation: Pt is an 83 yo female admitted for L flank pain and nausea. She is seen this date for OT krupa, she lives in [facility] alone, (I) with ADLs, no AD used prior to admission. She reports she feels weaker than normal, presenting with generalized weakness and decreased activity tolerance. She requires CGA for STS and functional mobility using rwx this date. She does not own rwx at this time. BR commode transfer with CGA, SBA for hygiene, CGA for brief mgment while standing. She will continue to benefit from skilled OT to address noted functional deficits and progress toward prior level of (I). Anticipate home at d/c.      Anticipated Discharge Disposition (OT): home

## 2024-10-01 NOTE — PROGRESS NOTES
"  [facility] DAILY PROGRESS NOTE      Name: Anita Frankel  Age: 84 y.o.  Sex: female  :  1940  MRN: [id number]    Date: 10/1/2024             Subjective:  Interval History:   Stable  Appears well  Sitting up in bed, conversant  Afebrile       Objective:    Scheduled Meds:amiodarone, 150 mg, Intravenous, Once  amLODIPine, 10 mg, Oral, Nightly  aspirin, 81 mg, Oral, Nightly  cefTRIAXone, 1,000 mg, Intravenous, Q24H  famotidine, 20 mg, Oral, BID  memantine, 10 mg, Oral, Q12H  polycarbophil, 625 mg, Oral, Daily  rivastigmine, 6 mg, Oral, BID With Meals  sodium bicarbonate, 650 mg, Oral, TID      Continuous Infusions:amiodarone, 0.5 mg/min  sodium chloride, 75 mL/hr, Last Rate: 75 mL/hr (24)      PRN Meds:  [COMPLETED] Insert Peripheral IV **AND** sodium chloride    temazepam    Vital signs in last 24 hours:  Temp:  [96.4 °F (35.8 °C)-100.4 °F (38 °C)] 98.8 °F (37.1 °C)  Heart Rate:  [] 105  Resp:  [16-18] 18  BP: (130-173)/(56-93) 130/56    Intake/Output:    Intake/Output Summary (Last 24 hours) at 10/1/2024 0649  Last data filed at 2024 1900  Gross per 24 hour   Intake 1100 ml   Output 100 ml   Net 1000 ml       Exam:  /56 (BP Location: Right arm, Patient Position: Lying)   Pulse 105   Temp 98.8 °F (37.1 °C) (Oral)   Resp 18   Ht 160 cm (63\")   Wt 72.9 kg (160 lb 11.5 oz)   SpO2 95%   BMI 28.47 kg/m²     No distress  Unlabored breathing    Data Review:  All labs (24hrs):   Recent Results (from the past 24 hour(s))   Comprehensive Metabolic Panel    Collection Time: 24  2:08 PM    Specimen: Blood   Result Value Ref Range    Glucose 109 (H) 65 - 99 mg/dL    BUN 53 (H) 8 - 23 mg/dL    Creatinine 2.97 (H) 0.57 - 1.00 mg/dL    Sodium 135 (L) 136 - 145 mmol/L    Potassium 4.0 3.5 - 5.2 mmol/L    Chloride 101 98 - 107 mmol/L    CO2 17.0 (L) 22.0 - 29.0 mmol/L    Calcium 9.8 8.6 - 10.5 mg/dL    Total Protein 7.4 6.0 - 8.5 g/dL    Albumin 3.6 3.5 - 5.2 g/dL    ALT (SGPT) 15 1 " - 33 U/L    AST (SGOT) 22 1 - 32 U/L    Alkaline Phosphatase 159 (H) 39 - 117 U/L    Total Bilirubin 0.5 0.0 - 1.2 mg/dL    Globulin 3.8 gm/dL    A/G Ratio 0.9 g/dL    BUN/Creatinine Ratio 17.8 7.0 - 25.0    Anion Gap 17.0 (H) 5.0 - 15.0 mmol/L    eGFR 14.9 (L) >60.0 mL/min/1.73   Lipase    Collection Time: 09/30/24  2:08 PM    Specimen: Blood   Result Value Ref Range    Lipase 14 13 - 60 U/L   CBC Auto Differential    Collection Time: 09/30/24  2:08 PM    Specimen: Blood   Result Value Ref Range    WBC 17.12 (H) 3.40 - 10.80 10*3/mm3    RBC 3.51 (L) 3.77 - 5.28 10*6/mm3    Hemoglobin 10.6 (L) 12.0 - 15.9 g/dL    Hematocrit 31.9 (L) 34.0 - 46.6 %    MCV 90.9 79.0 - 97.0 fL    MCH 30.2 26.6 - 33.0 pg    MCHC 33.2 31.5 - 35.7 g/dL    RDW 12.6 12.3 - 15.4 %    RDW-SD 41.0 37.0 - 54.0 fl    MPV 11.2 6.0 - 12.0 fL    Platelets 267 140 - 450 10*3/mm3    Neutrophil % 83.5 (H) 42.7 - 76.0 %    Lymphocyte % 5.4 (L) 19.6 - 45.3 %    Monocyte % 9.5 5.0 - 12.0 %    Eosinophil % 0.2 (L) 0.3 - 6.2 %    Basophil % 0.2 0.0 - 1.5 %    Immature Grans % 1.2 (H) 0.0 - 0.5 %    Neutrophils, Absolute 14.30 (H) 1.70 - 7.00 10*3/mm3    Lymphocytes, Absolute 0.93 0.70 - 3.10 10*3/mm3    Monocytes, Absolute 1.62 (H) 0.10 - 0.90 10*3/mm3    Eosinophils, Absolute 0.03 0.00 - 0.40 10*3/mm3    Basophils, Absolute 0.03 0.00 - 0.20 10*3/mm3    Immature Grans, Absolute 0.21 (H) 0.00 - 0.05 10*3/mm3    nRBC 0.0 0.0 - 0.2 /100 WBC   Procalcitonin    Collection Time: 09/30/24  2:08 PM    Specimen: Blood   Result Value Ref Range    Procalcitonin 1.19 (H) 0.00 - 0.25 ng/mL   Urinalysis With Microscopic If Indicated (No Culture) - Straight Cath    Collection Time: 09/30/24  4:10 PM    Specimen: Straight Cath; Urine   Result Value Ref Range    Color, UA Dark Yellow (A) Yellow, Straw    Appearance, UA Turbid (A) Clear    pH, UA 5.5 5.0 - 8.0    Specific Gravity, UA 1.015 1.005 - 1.030    Glucose, UA Negative Negative    Ketones, UA Trace (A) Negative     Bilirubin, UA Negative Negative    Blood, UA Small (1+) (A) Negative    Protein, UA 30 mg/dL (1+) (A) Negative    Leuk Esterase, UA Large (3+) (A) Negative    Nitrite, UA Positive (A) Negative    Urobilinogen, UA 0.2 E.U./dL 0.2 - 1.0 E.U./dL   Urinalysis, Microscopic Only - Straight Cath    Collection Time: 09/30/24  4:10 PM    Specimen: Straight Cath; Urine   Result Value Ref Range    RBC, UA 0-2 None Seen, 0-2 /HPF    WBC, UA Too Numerous to Count (A) None Seen, 0-2 /HPF    Bacteria, UA 4+ (A) None Seen /HPF    Squamous Epithelial Cells, UA 3-6 (A) None Seen, 0-2 /HPF    Hyaline Casts, UA 13-20 None Seen /LPF    Methodology Automated Microscopy    Sodium, Urine, Random - Straight Cath    Collection Time: 09/30/24  4:10 PM    Specimen: Straight Cath; Urine   Result Value Ref Range    Sodium, Urine 35 mmol/L   Creatinine Urine Random (kidney function) GFR component - Straight Cath    Collection Time: 09/30/24  4:10 PM    Specimen: Straight Cath; Urine   Result Value Ref Range    Creatinine, Urine 133.6 mg/dL   Lactic Acid, Plasma    Collection Time: 09/30/24  4:31 PM    Specimen: Blood   Result Value Ref Range    Lactate 1.5 0.5 - 2.0 mmol/L   ECG 12 Lead Rhythm Change    Collection Time: 09/30/24  8:48 PM   Result Value Ref Range    QT Interval 210 ms    QTC Interval 347 ms   BNP    Collection Time: 09/30/24 11:50 PM    Specimen: Blood   Result Value Ref Range    proBNP 1,009.0 0.0 - 1,800.0 pg/mL   ECG 12 Lead Rhythm Change    Collection Time: 10/01/24  1:23 AM   Result Value Ref Range    QT Interval 315 ms    QTC Interval 415 ms   Comprehensive Metabolic Panel    Collection Time: 10/01/24  2:48 AM    Specimen: Blood   Result Value Ref Range    Glucose 94 65 - 99 mg/dL    BUN 43 (H) 8 - 23 mg/dL    Creatinine 2.15 (H) 0.57 - 1.00 mg/dL    Sodium 136 136 - 145 mmol/L    Potassium 3.6 3.5 - 5.2 mmol/L    Chloride 104 98 - 107 mmol/L    CO2 16.4 (L) 22.0 - 29.0 mmol/L    Calcium 9.2 8.6 - 10.5 mg/dL    Total Protein  6.4 6.0 - 8.5 g/dL    Albumin 3.2 (L) 3.5 - 5.2 g/dL    ALT (SGPT) 14 1 - 33 U/L    AST (SGOT) 20 1 - 32 U/L    Alkaline Phosphatase 156 (H) 39 - 117 U/L    Total Bilirubin 0.3 0.0 - 1.2 mg/dL    Globulin 3.2 gm/dL    A/G Ratio 1.0 g/dL    BUN/Creatinine Ratio 20.0 7.0 - 25.0    Anion Gap 15.6 (H) 5.0 - 15.0 mmol/L    eGFR 21.9 (L) >60.0 mL/min/1.73   TSH    Collection Time: 10/01/24  2:48 AM    Specimen: Blood   Result Value Ref Range    TSH 0.701 0.270 - 4.200 uIU/mL   Lipid Panel    Collection Time: 10/01/24  2:48 AM    Specimen: Blood   Result Value Ref Range    Total Cholesterol 129 0 - 200 mg/dL    Triglycerides 94 0 - 150 mg/dL    HDL Cholesterol 43 40 - 60 mg/dL    LDL Cholesterol  68 0 - 100 mg/dL    VLDL Cholesterol 18 5 - 40 mg/dL    LDL/HDL Ratio 1.56    Hemoglobin A1c    Collection Time: 10/01/24  2:48 AM    Specimen: Blood   Result Value Ref Range    Hemoglobin A1C 5.00 4.80 - 5.60 %   CBC Auto Differential    Collection Time: 10/01/24  2:48 AM    Specimen: Blood   Result Value Ref Range    WBC 15.88 (H) 3.40 - 10.80 10*3/mm3    RBC 3.27 (L) 3.77 - 5.28 10*6/mm3    Hemoglobin 9.4 (L) 12.0 - 15.9 g/dL    Hematocrit 29.8 (L) 34.0 - 46.6 %    MCV 91.1 79.0 - 97.0 fL    MCH 28.7 26.6 - 33.0 pg    MCHC 31.5 31.5 - 35.7 g/dL    RDW 12.4 12.3 - 15.4 %    RDW-SD 41.9 37.0 - 54.0 fl    MPV 11.1 6.0 - 12.0 fL    Platelets 270 140 - 450 10*3/mm3    Neutrophil % 82.6 (H) 42.7 - 76.0 %    Lymphocyte % 6.3 (L) 19.6 - 45.3 %    Monocyte % 9.2 5.0 - 12.0 %    Eosinophil % 0.1 (L) 0.3 - 6.2 %    Basophil % 0.2 0.0 - 1.5 %    Immature Grans % 1.6 (H) 0.0 - 0.5 %    Neutrophils, Absolute 13.12 (H) 1.70 - 7.00 10*3/mm3    Lymphocytes, Absolute 1.00 0.70 - 3.10 10*3/mm3    Monocytes, Absolute 1.46 (H) 0.10 - 0.90 10*3/mm3    Eosinophils, Absolute 0.01 0.00 - 0.40 10*3/mm3    Basophils, Absolute 0.03 0.00 - 0.20 10*3/mm3    Immature Grans, Absolute 0.26 (H) 0.00 - 0.05 10*3/mm3    nRBC 0.0 0.0 - 0.2 /100 WBC   Uric Acid     Collection Time: 10/01/24  2:48 AM    Specimen: Blood   Result Value Ref Range    Uric Acid 8.9 (H) 2.4 - 5.7 mg/dL   Phosphorus    Collection Time: 10/01/24  2:48 AM    Specimen: Blood   Result Value Ref Range    Phosphorus 2.9 2.5 - 4.5 mg/dL   Magnesium    Collection Time: 10/01/24  2:48 AM    Specimen: Blood   Result Value Ref Range    Magnesium 1.5 (L) 1.6 - 2.4 mg/dL   ECG 12 Lead Drug Monitoring; Amiodarone    Collection Time: 10/01/24  3:23 AM   Result Value Ref Range    QT Interval 320 ms    QTC Interval 409 ms          Assessment:    SHARON (acute kidney injury)      L cystic renal mass; new and undiagnosed  SHARON - mildly improved     EMILI 9/30/2024  Multiple left renal cysts. No solid renal mass was identified by  ultrasound, suggest follow-up evaluation with enhanced renal imaging if  not contraindicated. No hydronephrosis or echogenic nephrolithiasis.     Plan:      -Reviewed EMILI  -IV fluids and trend creatinine  -Will potentially need repeat axial imaging with contrast or an MRI in the future to better delineate this left sided cystic structure  -Does appear to have a UTI; treat with antibiotics; urine culture pending  -If at any point she starts to develop fevers or abnormal vitals potentially will need to have IR aspirate or drain this cystic structure out of concern for potential abscess?  -Will follow    Jerald Vazquez MD  10/1/2024  06:49 EDT

## 2024-10-01 NOTE — ED NOTES
"Nursing report ED to floor  Anita Frankel  84 y.o.  female    HPI :  HPI (Adult)  Stated Reason for Visit: Elevated WBC per daughter  History Obtained From: family    Chief Complaint  Chief Complaint   Patient presents with    Abnormal Lab     Elevated WBC per family       Admitting doctor:   Roger Rush MD    Admitting diagnosis:   The primary encounter diagnosis was Left flank pain. Diagnoses of Acute renal failure, unspecified acute renal failure type, Leukocytosis, unspecified type, Abnormal CT of the abdomen, and SHARON (acute kidney injury) were also pertinent to this visit.    Code status:   Current Code Status       Date Active Code Status Order ID Comments User Context       9/30/2024 1808 No CPR (Do Not Attempt to Resuscitate) [id number]  Roger Rush MD ED        Question Answer    Code Status (Patient has no pulse and is not breathing) No CPR (Do Not Attempt to Resuscitate)    Medical Interventions (Patient has pulse or is breathing) Limited Support    Medical Intervention Limits: No cardioversion     No intubation (DNI)                    Allergies:   Patient has no known allergies.    Isolation:   No active isolations    Intake and Output    Intake/Output Summary (Last 24 hours) at 9/30/2024 2132  Last data filed at 9/30/2024 1900  Gross per 24 hour   Intake 1100 ml   Output 100 ml   Net 1000 ml       Weight:       09/30/24 2021   Weight: 72.6 kg (160 lb)       Most recent vitals:   Vitals:    09/30/24 1901 09/30/24 2020 09/30/24 2021 09/30/24 2111   BP:    150/72   BP Location:       Patient Position:       Pulse: 100   113   Resp:       Temp:       TempSrc:       SpO2: 95%      Weight:   72.6 kg (160 lb)    Height:  160 cm (63\")         Active LDAs/IV Access:   Lines, Drains & Airways       Active LDAs       Name Placement date Placement time Site Days    Peripheral IV 09/30/24 1409 Right Antecubital 09/30/24  1409  Antecubital  less than 1                    Labs (abnormal labs have a star):   Labs " Reviewed   COMPREHENSIVE METABOLIC PANEL - Abnormal; Notable for the following components:       Result Value    Glucose 109 (*)     BUN 53 (*)     Creatinine 2.97 (*)     Sodium 135 (*)     CO2 17.0 (*)     Alkaline Phosphatase 159 (*)     Anion Gap 17.0 (*)     eGFR 14.9 (*)     All other components within normal limits    Narrative:     GFR Normal >60  Chronic Kidney Disease <60  Kidney Failure <15    The GFR formula is only valid for adults with stable renal function between ages 18 and 70.   URINALYSIS W/ MICROSCOPIC IF INDICATED (NO CULTURE) - Abnormal; Notable for the following components:    Color, UA Dark Yellow (*)     Appearance, UA Turbid (*)     Ketones, UA Trace (*)     Blood, UA Small (1+) (*)     Protein, UA 30 mg/dL (1+) (*)     Leuk Esterase, UA Large (3+) (*)     Nitrite, UA Positive (*)     All other components within normal limits   CBC WITH AUTO DIFFERENTIAL - Abnormal; Notable for the following components:    WBC 17.12 (*)     RBC 3.51 (*)     Hemoglobin 10.6 (*)     Hematocrit 31.9 (*)     Neutrophil % 83.5 (*)     Lymphocyte % 5.4 (*)     Eosinophil % 0.2 (*)     Immature Grans % 1.2 (*)     Neutrophils, Absolute 14.30 (*)     Monocytes, Absolute 1.62 (*)     Immature Grans, Absolute 0.21 (*)     All other components within normal limits   PROCALCITONIN - Abnormal; Notable for the following components:    Procalcitonin 1.19 (*)     All other components within normal limits    Narrative:     As a Marker for Sepsis (Non-Neonates):    1. <0.5 ng/mL represents a low risk of severe sepsis and/or septic shock.  2. >2 ng/mL represents a high risk of severe sepsis and/or septic shock.    As a Marker for Lower Respiratory Tract Infections that require antibiotic therapy:    PCT on Admission    Antibiotic Therapy       6-12 Hrs later    >0.5                Strongly Recommended  >0.25 - <0.5        Recommended   0.1 - 0.25          Discouraged              Remeasure/reassess PCT  <0.1                 "Strongly Discouraged     Remeasure/reassess PCT    As 28 day mortality risk marker: \"Change in Procalcitonin Result\" (>80% or <=80%) if Day 0 (or Day 1) and Day 4 values are available. Refer to http://www.[facility]-pct-calculator.com    Change in PCT <=80%  A decrease of PCT levels below or equal to 80% defines a positive change in PCT test result representing a higher risk for 28-day all-cause mortality of patients diagnosed with severe sepsis for septic shock.    Change in PCT >80%  A decrease of PCT levels of more than 80% defines a negative change in PCT result representing a lower risk for 28-day all-cause mortality of patients diagnosed with severe sepsis or septic shock.      URINALYSIS, MICROSCOPIC ONLY - Abnormal; Notable for the following components:    WBC, UA Too Numerous to Count (*)     Bacteria, UA 4+ (*)     Squamous Epithelial Cells, UA 3-6 (*)     All other components within normal limits   LIPASE - Normal   LACTIC ACID, PLASMA - Normal   BLOOD CULTURE   BLOOD CULTURE   BNP (IN-HOUSE)   CBC AND DIFFERENTIAL    Narrative:     The following orders were created for panel order CBC & Differential.  Procedure                               Abnormality         Status                     ---------                               -----------         ------                     CBC Auto Differential[[id number]]        Abnormal            Final result                 Please view results for these tests on the individual orders.       EKG:   ECG 12 Lead Rhythm Change   Preliminary Result   HEART EFML=571  bpm   RR Kjjfjfnz=350  ms   OR Interval=85  ms   P Horizontal Axis=58  deg   P Front Axis=0  deg   QRSD Interval=68  ms   QT Kpuekrzw=595  ms   HNtW=032  ms   QRS Axis=-32  deg   T Wave Axis=162  deg   - ABNORMAL ECG -   Supraventricular tachycardia   Left axis deviation   Repolarization abnormality, prob rate related   Date and Time of Study:2024-09-30 20:48:02      ECG 12 Lead Drug Monitoring; Amiodarone    " (Results Pending)       Meds given in ED:   Medications   sodium chloride 0.9 % flush 10 mL (has no administration in time range)   amLODIPine (NORVASC) tablet 10 mg (0 mg Oral Hold 9/30/24 2055)   aspirin EC tablet 81 mg (has no administration in time range)   polycarbophil tablet 625 mg (has no administration in time range)   memantine (NAMENDA) tablet 10 mg (10 mg Oral Given 9/30/24 2111)   rivastigmine (EXELON) capsule 6 mg (6 mg Oral Given 9/30/24 2111)   temazepam (RESTORIL) capsule 30 mg (has no administration in time range)   cefTRIAXone (ROCEPHIN) 1,000 mg in sodium chloride 0.9 % 100 mL MBP (has no administration in time range)   sodium chloride 0.9 % infusion (75 mL/hr Intravenous New Bag 9/30/24 1911)   famotidine (PEPCID) tablet 20 mg (20 mg Oral Given 9/30/24 2111)   sodium bicarbonate tablet 650 mg (650 mg Oral Given 9/30/24 2110)   amiodarone 150 mg in 100 mL D5W (loading dose) (has no administration in time range)     Followed by   amiodarone 360 mg in 200 mL D5W infusion (has no administration in time range)     Followed by   amiodarone 360 mg in 200 mL D5W infusion (has no administration in time range)   sodium chloride 0.9 % bolus 1,000 mL (0 mL Intravenous Stopped 9/30/24 1900)   cefTRIAXone (ROCEPHIN) 2,000 mg in sodium chloride 0.9 % 100 mL MBP (0 mg Intravenous Stopped 9/30/24 1859)   metoprolol tartrate (LOPRESSOR) injection 5 mg (5 mg Intravenous Given 9/30/24 2111)       Imaging results:  US Renal Bilateral    Result Date: 9/30/2024  Multiple left renal cysts. No solid renal mass was identified by ultrasound, suggest follow-up evaluation with enhanced renal imaging if not contraindicated. No hydronephrosis or echogenic nephrolithiasis.   This report was finalized on 9/30/2024 5:53 PM by Dr. Otf Cardenas M.D on Workstation: [id number]      CT Abdomen Pelvis Without Contrast    Result Date: 9/30/2024   1. Nonspecific complex bilobed heterogeneous predominately low-attenuation masslike  collection in the upper pole of the left kidney and extending posteriorly into the retroperitoneal fat. Findings could represent a primary renal neoplasm or possible pyelonephritis with intrarenal and perirenal abscess. Correlate with urinalysis. Could consider further evaluation with focused ultrasound or dedicated pre and postcontrast CT or MRI if clinically indicated. 2. Mild diffuse anasarca. 3. Incompletely evaluated 1.5 cm low-density lesion in the right lobe of the liver, statistically representing a hepatic cyst or hemangioma. Recommend attention on follow-up. 4. Sclerotic foci in the right T10 transverse process and right side of the sacrum, possible benign bone islands. Could consider further evaluation with routine outpatient nuclear medicine bone scan if clinically indicated. 5. Mild diffuse colonic wall thickening could be accentuated by underdistention but could reflect a nonspecific colitis.  This report was finalized on 9/30/2024 3:41 PM by Dom Dalton MD on Workstation: [id number]       Ambulatory status:   - bed rest    Social issues:   Social History     Socioeconomic History    Marital status:    Tobacco Use    Smoking status: Never    Smokeless tobacco: Never   Substance and Sexual Activity    Alcohol use: Yes     Comment: very rarely    Drug use: No       Peripheral Neurovascular  Peripheral Neurovascular (Adult)  Peripheral Neurovascular WDL: WDL    Neuro Cognitive  Neuro Cognitive (Adult)  Cognitive/Neuro/Behavioral WDL: .WDL except, orientation, level of consciousness  Level of Consciousness: Alert  Orientation: disoriented to, time, situation    Learning  Learning Assessment (Adult)  Learning Readiness and Ability: cognitive limitation noted  Education Provided  Person Taught: patient  Teaching Method: verbal instruction  Teaching Focus: symptom/problem overview, risk factors/triggers, diagnostic test, medical device/equipment use, medication administration  Education Outcome  Evaluation: needs reinforcement    Respiratory  Respiratory (Adult)  Airway WDL: WDL  Respiratory WDL  Respiratory WDL: WDL    Abdominal Pain       Pain Assessments  Pain (Adult)  (0-10) Pain Rating: Rest: 0    NIH Stroke Scale       Paige Disla RN  09/30/24 21:32 EDT

## 2024-10-01 NOTE — CONSULTS
[facility]  Cardiology Consult Note    Patient Identification:  Name: Anita Frankel  Age: 84 y.o.  Sex: female  :  1940  MRN: [id number]             Requesting Physician: Dr. Evangelista    Reason for Consultation / Chief Complaint: SVT      History of Present Illness:   Anita Frankel is a 84-year-old female who presented to [facility] with left flank pain for the last few days who comes in consult for SVT.  She has a history of hypertension, dementia, left renal cystic mass, and osteoarthritis.      On admission BNP 1k, creatinine 2.97, GFR 14.9, ALT/AST WNL, hemoglobin 10.6 and platelets stable.  CT showed nonspecific complex bilobed heterogeneous predominantly low-attenuation masslike collection in the upper pole of the left kidney and extending posteriorly into the retroperitoneal fat, see full report as below.        Past Medical History:  Past Medical History:   Diagnosis Date    Hypertension     Kidney stone      Past Surgical History:  Past Surgical History:   Procedure Laterality Date    ABDOMINAL SURGERY      polyp removal    CHOLECYSTECTOMY      HYSTERECTOMY      TONSILLECTOMY        Allergies:  No Known Allergies  Home Meds:  Medications Prior to Admission   Medication Sig Dispense Refill Last Dose    aspirin 81 MG EC tablet Take 1 tablet by mouth Every Night.       B Complex-C (SUPER B COMPLEX PO) Take 1 tablet by mouth Daily.       calcium polycarbophil (FIBER LAXATIVE) 625 MG tablet Take 1 tablet by mouth Daily.       cholecalciferol (VITAMIN D3) 1000 units tablet Take 2 tablets by mouth Daily.       Cyanocobalamin (B-12 PO) Take 1 tablet by mouth Every Night.       desloratadine (CLARINEX REDITAB) 5 MG disintegrating tablet Take 1 tablet by mouth Every Morning.       ferrous sulfate 325 (65 FE) MG tablet Take 1 tablet by mouth Every Night.       irbesartan (AVAPRO) 300 MG tablet Take 1 tablet by mouth Every Night.       memantine (NAMENDA) 10 MG tablet Take 1  "tablet by mouth 2 (Two) Times a Day.       rivastigmine (EXELON) 6 MG capsule Take 1 capsule by mouth 2 (Two) Times a Day.       temazepam (RESTORIL) 30 MG capsule Take 1 capsule by mouth At Night As Needed for Sleep.       amLODIPine (NORVASC) 10 MG tablet Take 1 tablet by mouth Every Night.       atenolol (TENORMIN) 100 MG tablet Take 1 tablet by mouth 2 (Two) Times a Day.       hydrALAZINE (APRESOLINE) 50 MG tablet Take 1 tablet by mouth 2 (Two) Times a Day.       traMADol (ULTRAM) 50 MG tablet Take 1 tablet by mouth 2 (Two) Times a Day As Needed for Moderate Pain.        Current Meds:   [unfilled]  Social History:   Social History     Tobacco Use    Smoking status: Never     Passive exposure: Past    Smokeless tobacco: Never   Substance Use Topics    Alcohol use: Yes     Comment: very rarely      Family History:  History reviewed. No pertinent family history.     Review of Systems    Review of Systems  Constitutional: No wt loss, fever   Gastrointestinal: No nausea , abdominal pain  Behavioral/Psych: No insomnia or anxiety   Cardiovascular ----positive for shortness of breath. All other systems reviewed and are negative                Constitutional:  Temp:  [98.2 °F (36.8 °C)-100.4 °F (38 °C)] 98.2 °F (36.8 °C)  Heart Rate:  [] 95  Resp:  [18] 18  BP: (130-173)/(51-88) 140/51    Physical Exam   /65 (BP Location: Right arm, Patient Position: Lying)   Pulse 86   Temp 97.7 °F (36.5 °C) (Oral)   Resp 18   Ht 160 cm (63\")   Wt 73.5 kg (162 lb 0.6 oz)   SpO2 95%   BMI 28.70 kg/m²   General appearance: No acute changes   Neck: Trachea midline; NECK, supple, no thyromegaly or lymphadenopathy   Lungs: Normal size and shape, normal breath sounds, equal distribution of air, no rales and rhonchi   CV: S1-S2 regular, no murmurs, no rub, no gallop   Abdomen: Soft, nontender; no masses , no abnormal abdominal sounds   Extremities: No deformity , normal color , no peripheral edema   Skin: Normal temperature, " turgor and texture; no rash, ulcers                Cardiographics  ECG:             Echocardiogram: Pending    Stress test pending    Imaging    Narrative & Impression   CT ABDOMEN PELVIS WO CONTRAST-     DATE OF EXAM: 9/30/2024 2:32 PM     INDICATION: Left flank pain, suspect kidney stone.     COMPARISON: Chest radiographs 8/21/2018.     TECHNIQUE: Multiple contiguous axial images were acquired through the  abdomen and pelvis without the intravenous administration of contrast.  Reformatted coronal and sagittal sequences were also reviewed. Radiation  dose reduction techniques were utilized, including automated exposure  control and exposure modulation based on body size.     FINDINGS:  Multifocal bibasilar subsegmental atelectasis and/or scarring. Partially  imaged calcified coronary artery disease. Aortic valve calcifications.     Incompletely evaluated 1.5 cm low-density lesion in the right lobe of  the liver, statistically representing a hepatic cyst or hemangioma.  Status post cholecystectomy. The spleen is unremarkable in limited  noncontrast CT appearance. Fatty atrophy of the pancreas. Nonspecific  but possibly benign low-density nodular thickening of both adrenal  glands. Complex bilobed heterogeneous predominantly low-attenuation  masslike collection in the upper pole of the left kidney and extending  posteriorly into the retroperitoneal fat, measuring approximately 8.5 cm  x 5 cm in axial dimensions (axial series 2 image 51) and 7 cm  craniocaudal (coronal series 4 image 74). Tiny 2 mm nonobstructing  calcification in the lower pole the right kidney. The right kidney is  otherwise unremarkable in limited noncontrast CT appearance. No  obstructing renal or ureteral stone is identified. No hydronephrosis or  hydroureter. The urinary bladder is nondistended. Status post  hysterectomy. The adnexa not definitively identified.     Mild colorectal stool. Colonic diverticula, without CT evidence  of  diverticulitis. Mild diffuse colonic wall thickening could be  accentuated by underdistention but could reflect a nonspecific colitis.  No bowel obstruction. The appendix is normal.     No free fluid in the abdomen or pelvis. No free intraperitoneal air. No  pathologically enlarged lymph nodes are identified, although evaluation  is mildly limited by the lack of intravenous contrast. Moderate to  severe calcified atherosclerotic disease in the abdominal aorta and its  distal branches without aneurysm.     Mild diffuse anasarca. Rectus abdominis diastases. Anterior lower  abdominal wall surgical scar. Diffuse osteopenia. Transitional  lumbosacral vertebral anatomy with partially lumbarized S1 vertebral  segment and rudimentary S1-S2 disc space. Nonspecific sclerotic foci in  the right T10 transverse process and the right side of the sacrum at the  S4 vertebral level, possible benign bone islands. Mild to moderate  bilateral hip and SI joint DJD and mild to moderate DJD of the pubic  symphysis with chondrocalcinosis. No acute osseous abnormality is  identified.     IMPRESSION:     1. Nonspecific complex bilobed heterogeneous predominately  low-attenuation masslike collection in the upper pole of the left kidney  and extending posteriorly into the retroperitoneal fat. Findings could  represent a primary renal neoplasm or possible pyelonephritis with  intrarenal and perirenal abscess. Correlate with urinalysis. Could  consider further evaluation with focused ultrasound or dedicated pre and  postcontrast CT or MRI if clinically indicated.  2. Mild diffuse anasarca.  3. Incompletely evaluated 1.5 cm low-density lesion in the right lobe of  the liver, statistically representing a hepatic cyst or hemangioma.  Recommend attention on follow-up.  4. Sclerotic foci in the right T10 transverse process and right side of  the sacrum, possible benign bone islands. Could consider further  evaluation with routine outpatient  nuclear medicine bone scan if  clinically indicated.  5. Mild diffuse colonic wall thickening could be accentuated by  underdistention but could reflect a nonspecific colitis.     This report was finalized on 9/30/2024 3:41 PM by Dom Dalton MD on  Workstation: [id number]        Lab Review       Results from last 7 days   Lab Units 10/01/24  0248   MAGNESIUM mg/dL 1.5*     Results from last 7 days   Lab Units 10/01/24  0248   SODIUM mmol/L 136   POTASSIUM mmol/L 3.6   BUN mg/dL 43*   CREATININE mg/dL 2.15*   CALCIUM mg/dL 9.2     @[id number]@  Results from last 7 days   Lab Units 10/01/24  0248 09/30/24  1408   WBC 10*3/mm3 15.88* 17.12*   HEMOGLOBIN g/dL 9.4* 10.6*   HEMATOCRIT % 29.8* 31.9*   PLATELETS 10*3/mm3 270 267             Assessment:  SHARON versus CKD: Nephrology following  Left renal cystic mass  SVT  Dementia   hypertension   Anemia  hypomagnesia      Recommendations / Plan:   Anita Frankel is a 84-year-old female who presents to [facility] with left back flank pain she comes in consult due to atrial fibrillation.  Sinus rhythm noted now on the monitor.  No previous cardiac workup noted in the computer. At this time we will trend troponin, EKG, obtain an echo, and she will have a stress test tomorrow.      It was explained to the patient that stress testing carries 85% specificity/sensitivity, and does not rule out future cardiac event.  Risks of the procedure were explained to the patient including shortness of breath, induction of myocardial infarction, and dizziness.  Patient is agreeable to proceeding with stress testing.     Jeny Rodriguez, APRN  10/1/2024, 16:35 EDT  84 years old female admitted with flank pain was noted to have supraventricular tachycardia we will repeat EKGs and enzymes proceed with echo and the stress test  Braden James MD      EMR Dragon/Transcription:   Dictated utilizing Dragon dictation

## 2024-10-01 NOTE — THERAPY EVALUATION
Patient Name: Anita Frankel  : 1940    MRN: [id number]                              Today's Date: 10/1/2024       Admit Date: 2024    Visit Dx:     ICD-10-CM ICD-9-CM   1. Left flank pain  R10.9 789.09   2. Acute renal failure, unspecified acute renal failure type  N17.9 584.9   3. Leukocytosis, unspecified type  D72.829 288.60   4. Abnormal CT of the abdomen  R93.5 793.6   5. SHARON (acute kidney injury)  N17.9 584.9     Patient Active Problem List   Diagnosis    SHARON (acute kidney injury)     Past Medical History:   Diagnosis Date    Hypertension     Kidney stone      Past Surgical History:   Procedure Laterality Date    ABDOMINAL SURGERY      polyp removal    CHOLECYSTECTOMY      HYSTERECTOMY      TONSILLECTOMY        General Information       Row Name 10/01/24 0952          Physical Therapy Time and Intention    Document Type evaluation  -EF (r) CK (t) EF (c)     Mode of Treatment co-treatment;physical therapy;occupational therapy  -EF (r) CK (t) EF (c)       Row Name 10/01/24 0952          General Information    Patient Profile Reviewed yes  -EF (r) CK (t) EF (c)     Prior Level of Function independent:;all household mobility  no prior AD use  -EF (r) CK (t) EF (c)     Existing Precautions/Restrictions fall  -EF (r) CK (t) EF (c)     Barriers to Rehab none identified  -EF (r) CK (t) EF (c)       Row Name 10/01/24 0952          Living Environment    People in Home alone  -EF (r) CK (t) EF (c)       Row Name 10/01/24 0952          Home Main Entrance    Number of Stairs, Main Entrance two  -EF (r) CK (t) EF (c)       Row Name 10/01/24 0952          Stairs Within Home, Primary    Stairs, Within Home, Primary uses elevator to reach her condo  -EF (r) CK (t) EF (c)       Row Name 10/01/24 0989          Cognition    Orientation Status (Cognition) oriented x 3  -EF (r) CK (t) EF (c)       Row Name 10/01/24 0930          Safety Issues, Functional Mobility    Impairments Affecting Function (Mobility)  pain;balance;endurance/activity tolerance;strength  -EF (r) CK (t) EF (c)               User Key  (r) = Recorded By, (t) = Taken By, (c) = Cosigned By      Initials Name Provider Type    EF Angelina Mills, PT Physical Therapist    Kiersten Ching, PT Student PT Student                   Mobility       Row Name 10/01/24 0953          Bed Mobility    Bed Mobility supine-sit;sit-supine  -EF (r) CK (t) EF (c)     Supine-Sit [place] (Bed Mobility) contact guard  -EF (r) CK (t) EF (c)     Sit-Supine [place] (Bed Mobility) standby assist  -EF (r) CK (t) EF (c)     Assistive Device (Bed Mobility) bed rails;head of bed elevated  -EF (r) CK (t) EF (c)       Row Name 10/01/24 0953          Sit-Stand Transfer    Sit-Stand [place] (Transfers) contact guard  -EF (r) CK (t) EF (c)     Comment, (Sit-Stand Transfer) Able to complete STS transfer w/B UE pushing up off bed. No physical assistance required.  -EF (r) CK (t) EF (c)       Row Name 10/01/24 0953          Gait/Stairs (Locomotion)    [place] Level (Gait) contact guard  -EF (r) CK (t) EF (c)     Assistive Device (Gait) walker, front-wheeled  -EF (r) CK (t) EF (c)     Patient was able to Ambulate yes  -EF (r) CK (t) EF (c)     Distance in Feet (Gait) 50  -EF (r) CK (t) EF (c)     Deviations/Abnormal Patterns (Gait) stride length decreased;otto decreased  -EF (r) CK (t) EF (c)     Bilateral Gait Deviations forward flexed posture;heel strike decreased  -EF (r) CK (t) EF (c)     Comment, (Gait/Stairs) Few standing rest breaks due to first time walking since admission.  -EF (r) CK (t) EF (c)               User Key  (r) = Recorded By, (t) = Taken By, (c) = Cosigned By      Initials Name Provider Type    Angelina Rose, PT Physical Therapist    Kiersten Ching, PT Student PT Student                   Obj/Interventions       Row Name 10/01/24 0912          Range of Motion Comprehensive    General Range of Motion bilateral lower extremity ROM  WFL  -EF (r) CK (t) EF (c)       Row Name 10/01/24 0956          Strength Comprehensive (MMT)    Comment, General Manual Muscle Testing (MMT) Assessment Generalized weakness and deconditioning noted with functional tasks  -EF (r) CK (t) EF (c)       Row Name 10/01/24 0956          Balance    Balance Assessment sitting static balance;sitting dynamic balance;standing static balance;standing dynamic balance  -EF (r) CK (t) EF (c)     Static Sitting Balance standby assist  -EF (r) CK (t) EF (c)     Dynamic Sitting Balance standby assist  -EF (r) CK (t) EF (c)     Position, Sitting Balance sitting edge of bed  -EF (r) CK (t) EF (c)     Static Standing Balance contact guard  -EF (r) CK (t) EF (c)     Dynamic Standing Balance contact guard  -EF (r) CK (t) EF (c)     Comment, Balance Able to maintain sitting balance w/ B UE support. Slight unsteadiness noted w/ambulation, but no overt LOB.  -EF (r) CK (t) EF (c)               User Key  (r) = Recorded By, (t) = Taken By, (c) = Cosigned By      Initials Name Provider Type    EF Angelina Mills, PT Physical Therapist    Kiersten Ching, PT Student PT Student                   Goals/Plan       Row Name 10/01/24 1006          Bed Mobility Goal 1 (PT)    Activity/Assistive Device (Bed Mobility Goal 1, PT) bed mobility activities, all  -EF (r) CK (t) EF (c)     [place] Level/Cues Needed (Bed Mobility Goal 1, PT) independent  -EF (r) CK (t) EF (c)     Time Frame (Bed Mobility Goal 1, PT) long term goal (LTG);1 week  -EF (r) CK (t) EF (c)     Progress/Outcomes (Bed Mobility Goal 1, PT) new goal  -EF (r) CK (t) EF (c)       Row Name 10/01/24 1006          Transfer Goal 1 (PT)    Activity/Assistive Device (Transfer Goal 1, PT) transfers, all  -EF (r) CK (t) EF (c)     [place] Level/Cues Needed (Transfer Goal 1, PT) independent  -EF (r) CK (t) EF (c)     Time Frame (Transfer Goal 1, PT) 1 week;long term goal (LTG)  -EF (r) CK (t) EF (c)     Progress/Outcome (Transfer  Goal 1, PT) new goal  -EF (r) CK (t) EF (c)       Row Name 10/01/24 1006          Gait Training Goal 1 (PT)    Activity/Assistive Device (Gait Training Goal 1, PT) gait (walking locomotion)  -EF (r) CK (t) EF (c)     [place] Level (Gait Training Goal 1, PT) modified independence  -EF (r) CK (t) EF (c)     Distance (Gait Training Goal 1, PT) 150'  -EF (r) CK (t) EF (c)     Time Frame (Gait Training Goal 1, PT) long term goal (LTG);1 week  -EF (r) CK (t) EF (c)     Progress/Outcome (Gait Training Goal 1, PT) new goal  -EF (r) CK (t) EF (c)       Row Name 10/01/24 1007          Therapy Assessment/Plan (PT)    Planned Therapy Interventions (PT) gait training;transfer training;strengthening;balance training;bed mobility training;stair training;ROM (range of motion)  -EF (r) CK (t) EF (c)               User Key  (r) = Recorded By, (t) = Taken By, (c) = Cosigned By      Initials Name Provider Type    EF Angelina Mills, PT Physical Therapist    Kiersten Ching, PT Student PT Student                   Clinical Impression       Row Name 10/01/24 0923          Pain    Pretreatment Pain Rating 0/10 - no pain  -EF (r) CK (t) EF (c)     Posttreatment Pain Rating 0/10 - no pain  -EF (r) CK (t) EF (c)     Pain Intervention(s) Repositioned;Rest  -EF (r) CK (t) EF (c)       Row Name 10/01/24 0957          Plan of Care Review    Outcome Evaluation Pt is an 83 y/o female admitted on 9/30/2024 for L flank pain and nausea. Prior to admission, pt lives in a condo by herself w/2 MARCELLUS from outside and uses elevator to reach her floor. No prior AD use & I w/ADLs. Pt is currently A&Ox3 w/no c/o pain. Upon exam, presents with decreased strength and endurance. Reports she is feeling weaker than normal. Pt able to complete BM SBA, STS CGA, and ambulate 50' CGA w/rwx. Pt would benefit from skilled therapy to increase deficits noted above to return to baseline function w/mobility. Rec D/C to home w/HH vs. home pending progress.  -EF (r)  CK (t) EF (c)       Row Name 10/01/24 0957          Therapy Assessment/Plan (PT)    Rehab Potential (PT) good, to achieve stated therapy goals  -EF (r) CK (t) EF (c)     Criteria for Skilled Interventions Met (PT) yes  -EF (r) CK (t) EF (c)     Therapy Frequency (PT) 5 times/wk  -EF (r) CK (t) EF (c)       Row Name 10/01/24 0957          Positioning and Restraints    Pre-Treatment Position in bed  -EF (r) CK (t) EF (c)     Post Treatment Position bed  -EF (r) CK (t) EF (c)     In Bed call light within reach;encouraged to call for assist;exit alarm on;with family/caregiver;fowlers;side rails up x3  -EF (r) CK (t) EF (c)               User Key  (r) = Recorded By, (t) = Taken By, (c) = Cosigned By      Initials Name Provider Type    EF Angelina Mills, PT Physical Therapist    Kiersten Ching, PT Student PT Student                   Outcome Measures       Row Name 10/01/24 1007 10/01/24 0800       How much help from another person do you currently need...    Turning from your back to your side while in flat bed without using bedrails? 3  -EF (r) CK (t) EF (c) 3  -RF    Moving from lying on back to sitting on the side of a flat bed without bedrails? 3  -EF (r) CK (t) EF (c) 3  -RF    Moving to and from a bed to a chair (including a wheelchair)? 3  -EF (r) CK (t) EF (c) 3  -RF    Standing up from a chair using your arms (e.g., wheelchair, bedside chair)? 3  -EF (r) CK (t) EF (c) 3  -RF    Climbing 3-5 steps with a railing? 3  -EF (r) CK (t) EF (c) 3  -RF    To walk in hospital room? 3  -EF (r) CK (t) EF (c) 3  -RF    AM-PAC 6 Clicks Score (PT) 18  -EF (r) CK (t) 18  -RF    Highest Level of Mobility Goal 6 --> Walk 10 steps or more  -EF (r) CK (t) 6 --> Walk 10 steps or more  -RF              User Key  (r) = Recorded By, (t) = Taken By, (c) = Cosigned By      Initials Name Provider Type    Angelina Rose, PT Physical Therapist    hChaya Savage, RN Registered Nurse    Kiersten Ching, PT Student PT  Student                                 Physical Therapy Education       Title: PT OT SLP Therapies (Done)       Topic: Physical Therapy (Done)       Point: Mobility training (Done)       Learning Progress Summary             Patient Acceptance, MICA VASQUEZ DU by  at 10/1/2024 1008                                         User Key       Initials Effective Dates Name Provider Type Discipline     08/22/24 -  Kiersten Smith, PT Student PT Student PT                  PT Recommendation and Plan  Planned Therapy Interventions (PT): gait training, transfer training, strengthening, balance training, bed mobility training, stair training, ROM (range of motion)  Outcome Evaluation: Pt is an 83 y/o female admitted on 9/30/2024 for L flank pain and nausea. Prior to admission, pt lives in a condo by herself w/2 [address] from outside and uses elevator to reach her floor. No prior AD use & I w/ADLs. Pt is currently A&Ox3 w/no c/o pain. Upon exam, presents with decreased strength and endurance. Reports she is feeling weaker than normal. Pt able to complete BM SBA, STS CGA, and ambulate 50' CGA w/rwx. Pt would benefit from skilled therapy to increase deficits noted above to return to baseline function w/mobility. Rec D/C to home w/HH vs. home pending progress.     Time Calculation:         PT Charges       Row Name 10/01/24 1008             Time Calculation    Start Time 0930  -EF (r) CK (t) EF (c)      Stop Time 0947  -EF (r) CK (t) EF (c)      Time Calculation (min) 17 min  -EF (r) CK (t)      PT Received On 10/01/24  -EF (r) CK (t) EF (c)      PT - Next Appointment 10/02/24  -EF (r) CK (t) EF (c)      PT Goal Re-Cert Due Date 10/08/24  -EF (r) CK (t) EF (c)         Time Calculation- PT    Total Timed Code Minutes- PT 13 minute(s)  -EF (r) CK (t) EF (c)         Timed Charges    31725 - PT Therapeutic Activity Minutes 13  -EF (r) CK (t) EF (c)         Total Minutes    Timed Charges Total Minutes 13  -EF (r) CK (t)       Total Minutes 13   -EF (r) CK (t)                User Key  (r) = Recorded By, (t) = Taken By, (c) = Cosigned By      Initials Name Provider Type    Angelina Rose, PT Physical Therapist    Kiersten Ching, PT Student PT Student                  Therapy Charges for Today       Code Description Service Date Service Provider Modifiers Qty    [id number]  PT THERAPEUTIC ACT EA 15 MIN 10/1/2024 Kiersten Smith, PT Student GP 1    [id number]  PT EVAL LOW COMPLEXITY 2 10/1/2024 Kiersten Smith, PT Student GP 1            PT G-Codes  AM-PAC 6 Clicks Score (PT): 18  PT Discharge Summary  Anticipated Discharge Disposition (PT): home with home health, home    Kiersten Smith PT Student  10/1/2024

## 2024-10-02 ENCOUNTER — APPOINTMENT (OUTPATIENT)
Dept: CARDIOLOGY | Facility: HOSPITAL | Age: 84
End: 2024-10-02
Payer: MEDICARE

## 2024-10-02 LAB
ALBUMIN SERPL-MCNC: 3.2 G/DL (ref 3.5–5.2)
ALBUMIN/GLOB SERPL: 1 G/DL
ALP SERPL-CCNC: 123 U/L (ref 39–117)
ALT SERPL W P-5'-P-CCNC: 12 U/L (ref 1–33)
ANION GAP SERPL CALCULATED.3IONS-SCNC: 12 MMOL/L (ref 5–15)
AORTIC DIMENSIONLESS INDEX: 0.8 (DI)
ASCENDING AORTA: 3.2 CM
AST SERPL-CCNC: 16 U/L (ref 1–32)
BASOPHILS # BLD AUTO: 0.03 10*3/MM3 (ref 0–0.2)
BASOPHILS NFR BLD AUTO: 0.2 % (ref 0–1.5)
BH CV ECHO MEAS - ACS: 1.57 CM
BH CV ECHO MEAS - AO MAX PG: 16.6 MMHG
BH CV ECHO MEAS - AO MEAN PG: 7.8 MMHG
BH CV ECHO MEAS - AO ROOT DIAM: 2.9 CM
BH CV ECHO MEAS - AO V2 MAX: 203.8 CM/SEC
BH CV ECHO MEAS - AO V2 VTI: 36.7 CM
BH CV ECHO MEAS - AVA(I,D): 1.73 CM2
BH CV ECHO MEAS - EDV(CUBED): 79 ML
BH CV ECHO MEAS - EDV(MOD-SP2): 83 ML
BH CV ECHO MEAS - EDV(MOD-SP4): 83 ML
BH CV ECHO MEAS - EF(MOD-BP): 74.6 %
BH CV ECHO MEAS - EF(MOD-SP2): 74.7 %
BH CV ECHO MEAS - EF(MOD-SP4): 77.1 %
BH CV ECHO MEAS - ESV(CUBED): 19.1 ML
BH CV ECHO MEAS - ESV(MOD-SP2): 21 ML
BH CV ECHO MEAS - ESV(MOD-SP4): 19 ML
BH CV ECHO MEAS - FS: 37.7 %
BH CV ECHO MEAS - IVS/LVPW: 1.25 CM
BH CV ECHO MEAS - IVSD: 1 CM
BH CV ECHO MEAS - LAT PEAK E' VEL: 9.5 CM/SEC
BH CV ECHO MEAS - LV DIASTOLIC VOL/BSA (35-75): 46.9 CM2
BH CV ECHO MEAS - LV MASS(C)D: 123.5 GRAMS
BH CV ECHO MEAS - LV MAX PG: 9.3 MMHG
BH CV ECHO MEAS - LV MEAN PG: 4.7 MMHG
BH CV ECHO MEAS - LV SYSTOLIC VOL/BSA (12-30): 10.7 CM2
BH CV ECHO MEAS - LV V1 MAX: 152.2 CM/SEC
BH CV ECHO MEAS - LV V1 VTI: 26.7 CM
BH CV ECHO MEAS - LVIDD: 4.3 CM
BH CV ECHO MEAS - LVIDS: 2.7 CM
BH CV ECHO MEAS - LVOT AREA: 2.38 CM2
BH CV ECHO MEAS - LVOT DIAM: 1.74 CM
BH CV ECHO MEAS - LVPWD: 0.8 CM
BH CV ECHO MEAS - MED PEAK E' VEL: 11.9 CM/SEC
BH CV ECHO MEAS - MV A DUR: 0.08 SEC
BH CV ECHO MEAS - MV A MAX VEL: 89.2 CM/SEC
BH CV ECHO MEAS - MV DEC SLOPE: 420.9 CM/SEC2
BH CV ECHO MEAS - MV DEC TIME: 0.25 SEC
BH CV ECHO MEAS - MV E MAX VEL: 103 CM/SEC
BH CV ECHO MEAS - MV E/A: 1.16
BH CV ECHO MEAS - PA ACC TIME: 0.15 SEC
BH CV ECHO MEAS - PA V2 MAX: 69.4 CM/SEC
BH CV ECHO MEAS - QP/QS: 0.81
BH CV ECHO MEAS - RAP SYSTOLE: 3 MMHG
BH CV ECHO MEAS - RV MAX PG: 2.9 MMHG
BH CV ECHO MEAS - RV V1 MAX: 84.8 CM/SEC
BH CV ECHO MEAS - RV V1 VTI: 18.1 CM
BH CV ECHO MEAS - RVOT DIAM: 1.91 CM
BH CV ECHO MEAS - RVSP: 42.5 MMHG
BH CV ECHO MEAS - SV(LVOT): 63.5 ML
BH CV ECHO MEAS - SV(MOD-SP2): 62 ML
BH CV ECHO MEAS - SV(MOD-SP4): 64 ML
BH CV ECHO MEAS - SV(RVOT): 51.6 ML
BH CV ECHO MEAS - SVI(LVOT): 35.9 ML/M2
BH CV ECHO MEAS - SVI(MOD-SP2): 35.1 ML/M2
BH CV ECHO MEAS - SVI(MOD-SP4): 36.2 ML/M2
BH CV ECHO MEAS - TAPSE (>1.6): 3.3 CM
BH CV ECHO MEAS - TR MAX PG: 39.5 MMHG
BH CV ECHO MEAS - TR MAX VEL: 314.3 CM/SEC
BH CV ECHO MEASUREMENTS AVERAGE E/E' RATIO: 9.63
BH CV XLRA - TDI S': 21.9 CM/SEC
BILIRUB SERPL-MCNC: 0.3 MG/DL (ref 0–1.2)
BUN SERPL-MCNC: 32 MG/DL (ref 8–23)
BUN/CREAT SERPL: 19.4 (ref 7–25)
CALCIUM SPEC-SCNC: 9.4 MG/DL (ref 8.6–10.5)
CHLORIDE SERPL-SCNC: 108 MMOL/L (ref 98–107)
CO2 SERPL-SCNC: 17 MMOL/L (ref 22–29)
CREAT SERPL-MCNC: 1.65 MG/DL (ref 0.57–1)
DEPRECATED RDW RBC AUTO: 38.6 FL (ref 37–54)
EGFRCR SERPLBLD CKD-EPI 2021: 30.1 ML/MIN/1.73
EOSINOPHIL # BLD AUTO: 0.07 10*3/MM3 (ref 0–0.4)
EOSINOPHIL NFR BLD AUTO: 0.5 % (ref 0.3–6.2)
ERYTHROCYTE [DISTWIDTH] IN BLOOD BY AUTOMATED COUNT: 12 % (ref 12.3–15.4)
GEN 5 2HR TROPONIN T REFLEX: 24 NG/L
GLOBULIN UR ELPH-MCNC: 3.2 GM/DL
GLUCOSE SERPL-MCNC: 92 MG/DL (ref 65–99)
HCT VFR BLD AUTO: 28.4 % (ref 34–46.6)
HGB BLD-MCNC: 9.3 G/DL (ref 12–15.9)
IMM GRANULOCYTES # BLD AUTO: 0.35 10*3/MM3 (ref 0–0.05)
IMM GRANULOCYTES NFR BLD AUTO: 2.5 % (ref 0–0.5)
LEFT ATRIUM VOLUME INDEX: 15.8 ML/M2
LYMPHOCYTES # BLD AUTO: 1.51 10*3/MM3 (ref 0.7–3.1)
LYMPHOCYTES NFR BLD AUTO: 10.7 % (ref 19.6–45.3)
MAGNESIUM SERPL-MCNC: 2.1 MG/DL (ref 1.6–2.4)
MCH RBC QN AUTO: 29.2 PG (ref 26.6–33)
MCHC RBC AUTO-ENTMCNC: 32.7 G/DL (ref 31.5–35.7)
MCV RBC AUTO: 89.3 FL (ref 79–97)
MONOCYTES # BLD AUTO: 1.32 10*3/MM3 (ref 0.1–0.9)
MONOCYTES NFR BLD AUTO: 9.3 % (ref 5–12)
NEUTROPHILS NFR BLD AUTO: 10.86 10*3/MM3 (ref 1.7–7)
NEUTROPHILS NFR BLD AUTO: 76.8 % (ref 42.7–76)
NRBC BLD AUTO-RTO: 0 /100 WBC (ref 0–0.2)
PHOSPHATE SERPL-MCNC: 2.5 MG/DL (ref 2.5–4.5)
PLATELET # BLD AUTO: 288 10*3/MM3 (ref 140–450)
PMV BLD AUTO: 10.5 FL (ref 6–12)
POTASSIUM SERPL-SCNC: 3.9 MMOL/L (ref 3.5–5.2)
PROT SERPL-MCNC: 6.4 G/DL (ref 6–8.5)
RBC # BLD AUTO: 3.18 10*6/MM3 (ref 3.77–5.28)
SINUS: 2.47 CM
SODIUM SERPL-SCNC: 137 MMOL/L (ref 136–145)
STJ: 2.48 CM
TROPONIN T DELTA: 0 NG/L
TROPONIN T SERPL HS-MCNC: 24 NG/L
URATE SERPL-MCNC: 9.7 MG/DL (ref 2.4–5.7)
WBC NRBC COR # BLD AUTO: 14.14 10*3/MM3 (ref 3.4–10.8)

## 2024-10-02 PROCEDURE — 25510000001 PERFLUTREN 6.52 MG/ML SUSPENSION 2 ML VIAL: Performed by: HOSPITALIST

## 2024-10-02 PROCEDURE — 93306 TTE W/DOPPLER COMPLETE: CPT

## 2024-10-02 PROCEDURE — 93005 ELECTROCARDIOGRAM TRACING: CPT | Performed by: INTERNAL MEDICINE

## 2024-10-02 PROCEDURE — 99232 SBSQ HOSP IP/OBS MODERATE 35: CPT | Performed by: NURSE PRACTITIONER

## 2024-10-02 PROCEDURE — 93306 TTE W/DOPPLER COMPLETE: CPT | Performed by: INTERNAL MEDICINE

## 2024-10-02 PROCEDURE — 93010 ELECTROCARDIOGRAM REPORT: CPT | Performed by: INTERNAL MEDICINE

## 2024-10-02 PROCEDURE — 84550 ASSAY OF BLOOD/URIC ACID: CPT

## 2024-10-02 PROCEDURE — 83735 ASSAY OF MAGNESIUM: CPT

## 2024-10-02 PROCEDURE — 80053 COMPREHEN METABOLIC PANEL: CPT

## 2024-10-02 PROCEDURE — 84100 ASSAY OF PHOSPHORUS: CPT

## 2024-10-02 PROCEDURE — 25010000002 CEFTRIAXONE PER 250 MG: Performed by: HOSPITALIST

## 2024-10-02 PROCEDURE — 85025 COMPLETE CBC W/AUTO DIFF WBC: CPT

## 2024-10-02 PROCEDURE — 84484 ASSAY OF TROPONIN QUANT: CPT | Performed by: NURSE PRACTITIONER

## 2024-10-02 RX ORDER — AMLODIPINE BESYLATE 5 MG/1
5 TABLET ORAL NIGHTLY
Status: DISCONTINUED | OUTPATIENT
Start: 2024-10-03 | End: 2024-10-03

## 2024-10-02 RX ORDER — METOPROLOL SUCCINATE 25 MG/1
25 TABLET, EXTENDED RELEASE ORAL
Status: DISCONTINUED | OUTPATIENT
Start: 2024-10-02 | End: 2024-10-09 | Stop reason: HOSPADM

## 2024-10-02 RX ADMIN — MEMANTINE HYDROCHLORIDE 10 MG: 10 TABLET, FILM COATED ORAL at 20:58

## 2024-10-02 RX ADMIN — ASPIRIN 81 MG: 81 TABLET, COATED ORAL at 20:58

## 2024-10-02 RX ADMIN — SODIUM BICARBONATE 1300 MG: 650 TABLET, ORALLY DISINTEGRATING ORAL at 13:47

## 2024-10-02 RX ADMIN — PERFLUTREN 2 ML: 6.52 INJECTION, SUSPENSION INTRAVENOUS at 11:34

## 2024-10-02 RX ADMIN — CEFTRIAXONE SODIUM 1000 MG: 1 INJECTION, POWDER, FOR SOLUTION INTRAMUSCULAR; INTRAVENOUS at 13:57

## 2024-10-02 RX ADMIN — RIVASTIGMINE TARTRATE 6 MG: 1.5 CAPSULE ORAL at 13:56

## 2024-10-02 RX ADMIN — METOPROLOL SUCCINATE 25 MG: 25 TABLET, EXTENDED RELEASE ORAL at 20:59

## 2024-10-02 RX ADMIN — FAMOTIDINE 20 MG: 20 TABLET, FILM COATED ORAL at 20:58

## 2024-10-02 RX ADMIN — RIVASTIGMINE TARTRATE 6 MG: 1.5 CAPSULE ORAL at 17:45

## 2024-10-02 RX ADMIN — CALCIUM POLYCARBOPHIL 625 MG: 625 TABLET, FILM COATED ORAL at 13:56

## 2024-10-02 RX ADMIN — MEMANTINE HYDROCHLORIDE 10 MG: 10 TABLET, FILM COATED ORAL at 13:56

## 2024-10-02 RX ADMIN — SODIUM BICARBONATE 1300 MG: 650 TABLET, ORALLY DISINTEGRATING ORAL at 20:59

## 2024-10-02 RX ADMIN — FAMOTIDINE 20 MG: 20 TABLET, FILM COATED ORAL at 13:56

## 2024-10-02 NOTE — PROGRESS NOTES
[facility]  Cardiology Progress Note    Patient Identification:  Name: Anita Frankel  Age: 84 y.o.  Sex: female  :  1940  MRN: [id number]                 Follow Up / Chief Complaint: follow up for svt  Interval History: no cp or shob            Objective:    Past Medical History:  Past Medical History:   Diagnosis Date    Hypertension     Kidney stone      Past Surgical History:  Past Surgical History:   Procedure Laterality Date    ABDOMINAL SURGERY      polyp removal    CHOLECYSTECTOMY      HYSTERECTOMY      TONSILLECTOMY          Social History:   Social History     Tobacco Use    Smoking status: Never     Passive exposure: Past    Smokeless tobacco: Never   Substance Use Topics    Alcohol use: Yes     Comment: very rarely      Family History:  History reviewed. No pertinent family history.       Allergies:  No Known Allergies  Scheduled Meds:  amiodarone, 150 mg, Once  amLODIPine, 10 mg, Nightly  aspirin, 81 mg, Nightly  cefTRIAXone, 1,000 mg, Q24H  famotidine, 20 mg, BID  memantine, 10 mg, Q12H  polycarbophil, 625 mg, Daily  rivastigmine, 6 mg, BID With Meals  sodium bicarbonate, 1,300 mg, TID            INTAKE AND OUTPUT:    Intake/Output Summary (Last 24 hours) at 10/2/2024 1630  Last data filed at 10/2/2024 1300  Gross per 24 hour   Intake 450 ml   Output 200 ml   Net 250 ml       Review of Systems:   GI: no n/v  Cardiac: no cp  Pulmonary: no shob    Constitutional:  Temp:  [98.2 °F (36.8 °C)-100.6 °F (38.1 °C)] 98.2 °F (36.8 °C)  Heart Rate:  [] 93  Resp:  [17-18] 17  BP: (137-151)/(56-61) 141/59    Physical Exam:  General:  Appears in no acute distress, resting in bed  Eyes: eom normal no conjunctival drainage  HEENT:  No JVD. Thyroid not visibly enlarged. No mucosal pallor or cyanosis  Respiratory: Respirations regular and unlabored at rest. BBS with good air entry in all fields. No crackles, rubs or wheezes auscultated  Cardiovascular: S1S2 Regular rate and rhythm. No  murmur, rub or gallop. No carotid bruits. DP/PT pulses     . No pretibial pitting edema  Gastrointestinal: Abdomen soft, flat, non tender. Bowel sounds present. No hepatosplenomegaly. No ascites  Skin:   Skin warm and dry to touch. No rashes    Neuro: AAO x3 CN II-XII grossly intact  Psych: Mood and affect normal, pleasant and cooperative          I reviewed the patient's new clinical results, and personally reviewed and interpreted the patient's ECG and telemetry data from the last 24 hours          Cardiographics       ECG:      :                   Echocardiogram: Pending     Stress test pending     Imaging     Narrative & Impression   CT ABDOMEN PELVIS WO CONTRAST-     DATE OF EXAM: 9/30/2024 2:32 PM     INDICATION: Left flank pain, suspect kidney stone.     COMPARISON: Chest radiographs 8/21/2018.     TECHNIQUE: Multiple contiguous axial images were acquired through the  abdomen and pelvis without the intravenous administration of contrast.  Reformatted coronal and sagittal sequences were also reviewed. Radiation  dose reduction techniques were utilized, including automated exposure  control and exposure modulation based on body size.     FINDINGS:  Multifocal bibasilar subsegmental atelectasis and/or scarring. Partially  imaged calcified coronary artery disease. Aortic valve calcifications.     Incompletely evaluated 1.5 cm low-density lesion in the right lobe of  the liver, statistically representing a hepatic cyst or hemangioma.  Status post cholecystectomy. The spleen is unremarkable in limited  noncontrast CT appearance. Fatty atrophy of the pancreas. Nonspecific  but possibly benign low-density nodular thickening of both adrenal  glands. Complex bilobed heterogeneous predominantly low-attenuation  masslike collection in the upper pole of the left kidney and extending  posteriorly into the retroperitoneal fat, measuring approximately 8.5 cm  x 5 cm in axial dimensions (axial series 2 image 51) and 7  cm  craniocaudal (coronal series 4 image 74). Tiny 2 mm nonobstructing  calcification in the lower pole the right kidney. The right kidney is  otherwise unremarkable in limited noncontrast CT appearance. No  obstructing renal or ureteral stone is identified. No hydronephrosis or  hydroureter. The urinary bladder is nondistended. Status post  hysterectomy. The adnexa not definitively identified.     Mild colorectal stool. Colonic diverticula, without CT evidence of  diverticulitis. Mild diffuse colonic wall thickening could be  accentuated by underdistention but could reflect a nonspecific colitis.  No bowel obstruction. The appendix is normal.     No free fluid in the abdomen or pelvis. No free intraperitoneal air. No  pathologically enlarged lymph nodes are identified, although evaluation  is mildly limited by the lack of intravenous contrast. Moderate to  severe calcified atherosclerotic disease in the abdominal aorta and its  distal branches without aneurysm.     Mild diffuse anasarca. Rectus abdominis diastases. Anterior lower  abdominal wall surgical scar. Diffuse osteopenia. Transitional  lumbosacral vertebral anatomy with partially lumbarized S1 vertebral  segment and rudimentary S1-S2 disc space. Nonspecific sclerotic foci in  the right T10 transverse process and the right side of the sacrum at the  S4 vertebral level, possible benign bone islands. Mild to moderate  bilateral hip and SI joint DJD and mild to moderate DJD of the pubic  symphysis with chondrocalcinosis. No acute osseous abnormality is  identified.     IMPRESSION:     1. Nonspecific complex bilobed heterogeneous predominately  low-attenuation masslike collection in the upper pole of the left kidney  and extending posteriorly into the retroperitoneal fat. Findings could  represent a primary renal neoplasm or possible pyelonephritis with  intrarenal and perirenal abscess. Correlate with urinalysis. Could  consider further evaluation with focused  ultrasound or dedicated pre and  postcontrast CT or MRI if clinically indicated.  2. Mild diffuse anasarca.  3. Incompletely evaluated 1.5 cm low-density lesion in the right lobe of  the liver, statistically representing a hepatic cyst or hemangioma.  Recommend attention on follow-up.  4. Sclerotic foci in the right T10 transverse process and right side of  the sacrum, possible benign bone islands. Could consider further  evaluation with routine outpatient nuclear medicine bone scan if  clinically indicated.  5. Mild diffuse colonic wall thickening could be accentuated by  underdistention but could reflect a nonspecific colitis.     This report was finalized on 9/30/2024 3:41 PM by Dom Dalton MD on  Workstation: [id number]     Lab Review       Results from last 7 days   Lab Units 10/02/24  0327   MAGNESIUM mg/dL 2.1     Results from last 7 days   Lab Units 10/02/24  0327   SODIUM mmol/L 137   POTASSIUM mmol/L 3.9   BUN mg/dL 32*   CREATININE mg/dL 1.65*   CALCIUM mg/dL 9.4     @[id number]@  Results from last 7 days   Lab Units 10/02/24  0327 10/01/24  0248 09/30/24  1408   WBC 10*3/mm3 14.14* 15.88* 17.12*   HEMOGLOBIN g/dL 9.3* 9.4* 10.6*   HEMATOCRIT % 28.4* 29.8* 31.9*   PLATELETS 10*3/mm3 288 270 267      10/2/24            10/1/24      Assessment:    HSARON versus CKD: Nephrology following  Left renal cystic mass  SVT  Dementia   hypertension   Anemia  hypomagnesia      Plan:  -SVT noted yesterday  -Blood pressure elevated and will add metoprolol succinate 25 mg daily and decrease amlodipine.  She will undergo a stress test tomorrow, n.p.o. after midnight, and lab.      It was explained to the patient that stress testing carries 85% specificity/sensitivity, and does not rule out future cardiac event.  Risks of the procedure were explained to the patient including shortness of breath, induction of myocardial infarction, and dizziness.  Patient is agreeable to proceeding with stress testing.  "      )10/2/2024  GLENN Goode/Transcription:   \"Dictated utilizing Dragon dictation\".     "

## 2024-10-02 NOTE — PROGRESS NOTES
"Daily progress note    Primary care physician  Dr. Bauer    Subjective  Doing better with no new complaint and family at bedside    History of present illness  84-year-old female with history of hypertension and dementia who lives by herself presented to [facility] emergency room with left flank pain for last few days which is getting worse.  Patient also complained of nausea but no vomiting diarrhea.  Patient also denies any fever chills chest pain shortness of breath palpitation.  Patient workup in ER revealed acute kidney injury and left cystic renal mass admitted for management.      REVIEW OF SYSTEMS  Unremarkable except generalized weakness     PHYSICAL EXAM   Blood pressure 141/59, pulse 93, temperature 98.2 °F (36.8 °C), temperature source Oral, resp. rate 17, height 160 cm (63\"), weight 73.5 kg (162 lb), SpO2 98%.    GENERAL: Alert well-appearing female no obvious distress.   SKIN: Warm, dry  HENT: Normocephalic, atraumatic  EYES: no scleral icterus  CV: regular rhythm, regular rate  RESPIRATORY: normal effort, moving air bilaterally  ABDOMEN: soft, nontender, nondistended bowel sounds positive  MUSCULOSKELETAL: no deformity  NEURO: alert, moves all extremities, follows commands     LAB RESULTS  Lab Results (last 24 hours)       Procedure Component Value Units Date/Time    Magnesium [[id number]]  (Normal) Collected: 10/02/24 0327    Specimen: Blood Updated: 10/02/24 0415     Magnesium 2.1 mg/dL     Phosphorus [[id number]]  (Normal) Collected: 10/02/24 0327    Specimen: Blood Updated: 10/02/24 0413     Phosphorus 2.5 mg/dL     Comprehensive Metabolic Panel [[id number]]  (Abnormal) Collected: 10/02/24 0327    Specimen: Blood Updated: 10/02/24 0413     Glucose 92 mg/dL      BUN 32 mg/dL      Creatinine 1.65 mg/dL      Sodium 137 mmol/L      Potassium 3.9 mmol/L      Chloride 108 mmol/L      CO2 17.0 mmol/L      Calcium 9.4 mg/dL      Total Protein 6.4 g/dL      Albumin 3.2 g/dL      ALT (SGPT) 12 " U/L      AST (SGOT) 16 U/L      Alkaline Phosphatase 123 U/L      Total Bilirubin 0.3 mg/dL      Globulin 3.2 gm/dL      A/G Ratio 1.0 g/dL      BUN/Creatinine Ratio 19.4     Anion Gap 12.0 mmol/L      eGFR 30.1 mL/min/1.73     Narrative:      GFR Normal >60  Chronic Kidney Disease <60  Kidney Failure <15    The GFR formula is only valid for adults with stable renal function between ages 18 and 70.    Uric Acid [[id number]]  (Abnormal) Collected: 10/02/24 0327    Specimen: Blood Updated: 10/02/24 0413     Uric Acid 9.7 mg/dL     CBC & Differential [[id number]]  (Abnormal) Collected: 10/02/24 0327    Specimen: Blood Updated: 10/02/24 0353    Narrative:      The following orders were created for panel order CBC & Differential.  Procedure                               Abnormality         Status                     ---------                               -----------         ------                     CBC Auto Differential[[id number]]        Abnormal            Final result                 Please view results for these tests on the individual orders.    CBC Auto Differential [[id number]]  (Abnormal) Collected: 10/02/24 0327    Specimen: Blood Updated: 10/02/24 0353     WBC 14.14 10*3/mm3      RBC 3.18 10*6/mm3      Hemoglobin 9.3 g/dL      Hematocrit 28.4 %      MCV 89.3 fL      MCH 29.2 pg      MCHC 32.7 g/dL      RDW 12.0 %      RDW-SD 38.6 fl      MPV 10.5 fL      Platelets 288 10*3/mm3      Neutrophil % 76.8 %      Lymphocyte % 10.7 %      Monocyte % 9.3 %      Eosinophil % 0.5 %      Basophil % 0.2 %      Immature Grans % 2.5 %      Neutrophils, Absolute 10.86 10*3/mm3      Lymphocytes, Absolute 1.51 10*3/mm3      Monocytes, Absolute 1.32 10*3/mm3      Eosinophils, Absolute 0.07 10*3/mm3      Basophils, Absolute 0.03 10*3/mm3      Immature Grans, Absolute 0.35 10*3/mm3      nRBC 0.0 /100 WBC     Blood Culture - Blood, Arm, Left [[id number]]  (Normal) Collected: 09/30/24 1622    Specimen: Blood from Arm, Left Updated:  10/01/24 1645     Blood Culture No growth at 24 hours    Blood Culture - Blood, Arm, Right [[id number]]  (Normal) Collected: 24 163    Specimen: Blood from Arm, Right Updated: 10/01/24 1645     Blood Culture No growth at 24 hours          Imaging Results (Last 24 Hours)       ** No results found for the last 24 hours. **            Current Facility-Administered Medications:     amiodarone 150 mg in 100 mL D5W (loading dose), 150 mg, Intravenous, Once **FOLLOWED BY** [] amiodarone 360 mg in 200 mL D5W infusion, 1 mg/min, Intravenous, Continuous **FOLLOWED BY** [] amiodarone 360 mg in 200 mL D5W infusion, 0.5 mg/min, Intravenous, Continuous, Braden James MD    amLODIPine (NORVASC) tablet 10 mg, 10 mg, Oral, Nightly, Roger Rush MD, 10 mg at 10/01/24 2025    aspirin EC tablet 81 mg, 81 mg, Oral, Nightly, Roger Rush MD, 81 mg at 10/01/24 2025    cefTRIAXone (ROCEPHIN) 1,000 mg in sodium chloride 0.9 % 100 mL MBP, 1,000 mg, Intravenous, Q24H, Roger Rush MD, Last Rate: 200 mL/hr at 10/02/24 1357, 1,000 mg at 10/02/24 1357    famotidine (PEPCID) tablet 20 mg, 20 mg, Oral, BID, Roger Rush MD, 20 mg at 10/02/24 135    memantine (NAMENDA) tablet 10 mg, 10 mg, Oral, Q12H, Roger Rush MD, 10 mg at 10/02/24 135    polycarbophil tablet 625 mg, 625 mg, Oral, Daily, Roger Rush MD, 625 mg at 10/02/24 135    rivastigmine (EXELON) capsule 6 mg, 6 mg, Oral, BID With Meals, Roger Rush MD, 6 mg at 10/02/24 135    sodium bicarbonate tablet 1,300 mg, 1,300 mg, Oral, TID, Gomez Mesa MD, 1,300 mg at 10/02/24 9957    [COMPLETED] Insert Peripheral IV, , , Once **AND** sodium chloride 0.9 % flush 10 mL, 10 mL, Intravenous, PRN, AlbertoBaldo ortiz MD    temazepam (RESTORIL) capsule 30 mg, 30 mg, Oral, Nightly PRN, Roger Rush MD     ASSESSMENT  Acute kidney injury resolving  Left cystic renal mass  Supraventricular tachycardia  Acute  UTI  Hypertension  Dementia    PLAN  CPM  Continue IVF  Continue IV antibiotics  Control the heart rate  Cardiology consult appreciated  Nephrology and urology to follow patient  Adjust home medications  Stress ulcer DVT prophylaxis  Supportive care  PT/OT  Discussed with family nursing staff  Follow closely further recommendation current hospital course    LATONIA VELASQUEZ MD    Copied text in this note has been reviewed and is accurate as of 10/02/24

## 2024-10-02 NOTE — PLAN OF CARE
Goal Outcome Evaluation:  Plan of Care Reviewed With: patient, daughter           Outcome Evaluation: No acute changes this shift. VSS. AOX3. medications given per MAR.

## 2024-10-02 NOTE — PROGRESS NOTES
[facility] Progress Note      Patient Name: Anita Frankel  : 1940  MRN: [id number]  Primary Care Physician:  Rowan Bauer MD  Date of admission: 2024    Subjective     Interval History:   Follow-up on SHARON on CKD    Low grade fever last night  Was not dizzy when standing briefly earlier today  No chest pain, SOA, or N/V; appetite is good  UOP not fully quantified, though voiding fine per daughter    Review of Systems:   As noted above    Objective     Vitals:   Temp:  [98.2 °F (36.8 °C)-100.6 °F (38.1 °C)] 98.2 °F (36.8 °C)  Heart Rate:  [] 93  Resp:  [17-18] 17  BP: (137-151)/(56-61) 141/59    Intake/Output Summary (Last 24 hours) at 10/2/2024 1640  Last data filed at 10/2/2024 1300  Gross per 24 hour   Intake 450 ml   Output 200 ml   Net 250 ml       Physical Exam:    General Appearance: alert, not oriented, NAD  Skin: warm and dry  HEENT: oral mucosa normal, nonicteric sclera, mucous membrane moist  Neck: supple, no JVD  Lungs: Bibasilar crackles, fewer than yesterday, nonlabored on RA  Heart: Irregularly irregular, tachycardic, no murmur, no rub  Abdomen: soft, nontender, nondistended, + BS  : no palpable bladder  Extremities: Trace doughy BLE edema, no cyanosis or clubbing  Neuro: normal speech and mental status     Scheduled Meds:     amiodarone, 150 mg, Intravenous, Once  [START ON 10/3/2024] amLODIPine, 5 mg, Oral, Nightly  aspirin, 81 mg, Oral, Nightly  cefTRIAXone, 1,000 mg, Intravenous, Q24H  famotidine, 20 mg, Oral, BID  memantine, 10 mg, Oral, Q12H  metoprolol succinate XL, 25 mg, Oral, Q24H  polycarbophil, 625 mg, Oral, Daily  rivastigmine, 6 mg, Oral, BID With Meals  sodium bicarbonate, 1,300 mg, Oral, TID      IV Meds:          Results Reviewed:   I have personally reviewed the results from the time of this admission to 10/2/2024 16:40 EDT     Results from last 7 days   Lab Units 10/02/24  0327 10/01/24  0248 24  1408   SODIUM mmol/L  137 136 135*   POTASSIUM mmol/L 3.9 3.6 4.0   CHLORIDE mmol/L 108* 104 101   CO2 mmol/L 17.0* 16.4* 17.0*   BUN mg/dL 32* 43* 53*   CREATININE mg/dL 1.65* 2.15* 2.97*   CALCIUM mg/dL 9.4 9.2 9.8   BILIRUBIN mg/dL 0.3 0.3 0.5   ALK PHOS U/L 123* 156* 159*   ALT (SGPT) U/L 12 14 15   AST (SGOT) U/L 16 20 22   GLUCOSE mg/dL 92 94 109*       Estimated Creatinine Clearance: 23.5 mL/min (A) (by C-G formula based on SCr of 1.65 mg/dL (H)).    Results from last 7 days   Lab Units 10/02/24  0327 10/01/24  0248   MAGNESIUM mg/dL 2.1 1.5*   PHOSPHORUS mg/dL 2.5 2.9       Results from last 7 days   Lab Units 10/02/24  0327 10/01/24  0248   URIC ACID mg/dL 9.7* 8.9*       Results from last 7 days   Lab Units 10/02/24  0327 10/01/24  0248 09/30/24  1408   WBC 10*3/mm3 14.14* 15.88* 17.12*   HEMOGLOBIN g/dL 9.3* 9.4* 10.6*   PLATELETS 10*3/mm3 288 270 267             Assessment / Plan     ASSESSMENT:  SHARON on likely CKD, improving:  prerenal from decline in oral intake, use of irbesartan limiting renal autoregulation, and infection in the urinary tract. Hypovolemia resolved with IVF; normal potassium and likely NAGMA. Urine sodium-avid. Difficult to interpret UA given squamous cells.  Despite significant pyuria and bacteriuria, she has no urinary complaints other than low back pain  UTI/fever/left renal cystic mass by CT scan, on IV antibiotics, urology not planning any intervention for now  Dementia  Hypertension, BP acceptable on amlodipine 10 mg  SVT, resolved without amiodarone drip, echo today (10/2) showed EF 74.6% with RVSP 43mmHg  Anemia, hgb stable     PLAN:  Encouraged her to eat  Continue oral sodium bicarbonate  Surveillance labs  Discussed with daughter at bedside    Thank you for involving us in the care of Anita Frankel.  Please feel free to call with any questions.    Gomez Elia Moshe, MD  10/02/24  16:40 EDT    [facility]  [phone]    Please note that portions of this note were  completed with a voice recognition program.

## 2024-10-02 NOTE — PROGRESS NOTES
LOS: 1 day   Patient Care Team:  Rowan Bauer MD as PCP - General (Internal Medicine)  Braden James MD as Consulting Physician (Cardiology)    Chief Complaint:  Flank pain.      Subjective     Interval History:     Patient Complaints: Pain improved.  Voiding well.      Review of Systems:    All systems were reviewed and negative except for:  Genitourinary: postivie for  pain    Objective     Vital Signs  Temp:  [98.2 °F (36.8 °C)-100.6 °F (38.1 °C)] 99.3 °F (37.4 °C)  Heart Rate:  [] 95  Resp:  [18] 18  BP: (137-151)/(51-61) 137/61    Physical Exam:   No exam performed today,     Results Review:     I reviewed the patient's new clinical results.    Medication Review:     Assessment & Plan       SHARON (acute kidney injury)      SHARON - improving.  Left upper pole cystic renal mass.  Pt is improving on antibx. Do not see need for intervention emergently. Will need f/u MRI to further evaluate lesion once infection cleared.  Await final cultures.        Plan for disposition:    Albaro Barcenas MD  10/02/24  08:06 EDT      Time:

## 2024-10-03 ENCOUNTER — APPOINTMENT (OUTPATIENT)
Dept: NUCLEAR MEDICINE | Facility: HOSPITAL | Age: 84
End: 2024-10-03
Payer: MEDICARE

## 2024-10-03 LAB
ALBUMIN SERPL-MCNC: 3.1 G/DL (ref 3.5–5.2)
ANION GAP SERPL CALCULATED.3IONS-SCNC: 12.3 MMOL/L (ref 5–15)
BASOPHILS # BLD AUTO: 0.03 10*3/MM3 (ref 0–0.2)
BASOPHILS NFR BLD AUTO: 0.2 % (ref 0–1.5)
BH CV REST NUCLEAR ISOTOPE DOSE: 11 MCI
BH CV STRESS BP STAGE 1: NORMAL
BH CV STRESS COMMENTS STAGE 1: NORMAL
BH CV STRESS DOSE REGADENOSON STAGE 1: 0.4
BH CV STRESS DURATION MIN STAGE 1: 3
BH CV STRESS DURATION SEC STAGE 1: 59
BH CV STRESS HR STAGE 1: 98
BH CV STRESS NUCLEAR ISOTOPE DOSE: 35 MCI
BH CV STRESS PROTOCOL 1: NORMAL
BH CV STRESS RECOVERY BP: NORMAL MMHG
BH CV STRESS RECOVERY HR: 97 BPM
BH CV STRESS STAGE 1: 1
BUN SERPL-MCNC: 25 MG/DL (ref 8–23)
BUN/CREAT SERPL: 16 (ref 7–25)
CALCIUM SPEC-SCNC: 9.4 MG/DL (ref 8.6–10.5)
CHLORIDE SERPL-SCNC: 106 MMOL/L (ref 98–107)
CO2 SERPL-SCNC: 18.7 MMOL/L (ref 22–29)
CREAT SERPL-MCNC: 1.56 MG/DL (ref 0.57–1)
DEPRECATED RDW RBC AUTO: 39.5 FL (ref 37–54)
EGFRCR SERPLBLD CKD-EPI 2021: 32.2 ML/MIN/1.73
EOSINOPHIL # BLD AUTO: 0.1 10*3/MM3 (ref 0–0.4)
EOSINOPHIL NFR BLD AUTO: 0.8 % (ref 0.3–6.2)
ERYTHROCYTE [DISTWIDTH] IN BLOOD BY AUTOMATED COUNT: 12 % (ref 12.3–15.4)
GLUCOSE SERPL-MCNC: 95 MG/DL (ref 65–99)
HCT VFR BLD AUTO: 30.6 % (ref 34–46.6)
HGB BLD-MCNC: 10 G/DL (ref 12–15.9)
IMM GRANULOCYTES # BLD AUTO: 0.35 10*3/MM3 (ref 0–0.05)
IMM GRANULOCYTES NFR BLD AUTO: 2.7 % (ref 0–0.5)
LV EF NUC BP: 60 %
LYMPHOCYTES # BLD AUTO: 1.33 10*3/MM3 (ref 0.7–3.1)
LYMPHOCYTES NFR BLD AUTO: 10.2 % (ref 19.6–45.3)
MAXIMAL PREDICTED HEART RATE: 134 BPM
MCH RBC QN AUTO: 29.7 PG (ref 26.6–33)
MCHC RBC AUTO-ENTMCNC: 32.7 G/DL (ref 31.5–35.7)
MCV RBC AUTO: 90.8 FL (ref 79–97)
MONOCYTES # BLD AUTO: 1.12 10*3/MM3 (ref 0.1–0.9)
MONOCYTES NFR BLD AUTO: 8.6 % (ref 5–12)
NEUTROPHILS NFR BLD AUTO: 10.13 10*3/MM3 (ref 1.7–7)
NEUTROPHILS NFR BLD AUTO: 77.5 % (ref 42.7–76)
NRBC BLD AUTO-RTO: 0 /100 WBC (ref 0–0.2)
PERCENT MAX PREDICTED HR: 76.12 %
PHOSPHATE SERPL-MCNC: 2.9 MG/DL (ref 2.5–4.5)
PLATELET # BLD AUTO: 306 10*3/MM3 (ref 140–450)
PMV BLD AUTO: 10.4 FL (ref 6–12)
POTASSIUM SERPL-SCNC: 4 MMOL/L (ref 3.5–5.2)
RBC # BLD AUTO: 3.37 10*6/MM3 (ref 3.77–5.28)
SODIUM SERPL-SCNC: 137 MMOL/L (ref 136–145)
STRESS BASELINE BP: NORMAL MMHG
STRESS BASELINE HR: 93 BPM
STRESS PERCENT HR: 90 %
STRESS POST EXERCISE DUR MIN: 3 MIN
STRESS POST EXERCISE DUR SEC: 59 SEC
STRESS POST PEAK BP: NORMAL MMHG
STRESS POST PEAK HR: 102 BPM
STRESS TARGET HR: 114 BPM
TROPONIN T SERPL HS-MCNC: 25 NG/L
URATE SERPL-MCNC: 9.1 MG/DL (ref 2.4–5.7)
WBC NRBC COR # BLD AUTO: 13.06 10*3/MM3 (ref 3.4–10.8)

## 2024-10-03 PROCEDURE — 78452 HT MUSCLE IMAGE SPECT MULT: CPT

## 2024-10-03 PROCEDURE — 84550 ASSAY OF BLOOD/URIC ACID: CPT

## 2024-10-03 PROCEDURE — 0 TECHNETIUM TETROFOSMIN KIT: Performed by: HOSPITALIST

## 2024-10-03 PROCEDURE — 85025 COMPLETE CBC W/AUTO DIFF WBC: CPT

## 2024-10-03 PROCEDURE — 80069 RENAL FUNCTION PANEL: CPT

## 2024-10-03 PROCEDURE — 84484 ASSAY OF TROPONIN QUANT: CPT | Performed by: NURSE PRACTITIONER

## 2024-10-03 PROCEDURE — 93010 ELECTROCARDIOGRAM REPORT: CPT | Performed by: INTERNAL MEDICINE

## 2024-10-03 PROCEDURE — 25010000002 REGADENOSON 0.4 MG/5ML SOLUTION: Performed by: HOSPITALIST

## 2024-10-03 PROCEDURE — 99232 SBSQ HOSP IP/OBS MODERATE 35: CPT | Performed by: INTERNAL MEDICINE

## 2024-10-03 PROCEDURE — 78452 HT MUSCLE IMAGE SPECT MULT: CPT | Performed by: INTERNAL MEDICINE

## 2024-10-03 PROCEDURE — 93017 CV STRESS TEST TRACING ONLY: CPT

## 2024-10-03 PROCEDURE — A9502 TC99M TETROFOSMIN: HCPCS | Performed by: HOSPITALIST

## 2024-10-03 PROCEDURE — 25010000002 CEFTRIAXONE PER 250 MG: Performed by: HOSPITALIST

## 2024-10-03 PROCEDURE — 93018 CV STRESS TEST I&R ONLY: CPT | Performed by: INTERNAL MEDICINE

## 2024-10-03 PROCEDURE — 93005 ELECTROCARDIOGRAM TRACING: CPT | Performed by: INTERNAL MEDICINE

## 2024-10-03 RX ORDER — ROSUVASTATIN CALCIUM 10 MG/1
10 TABLET, COATED ORAL NIGHTLY
Status: DISCONTINUED | OUTPATIENT
Start: 2024-10-03 | End: 2024-10-09 | Stop reason: HOSPADM

## 2024-10-03 RX ORDER — AMLODIPINE BESYLATE 10 MG/1
10 TABLET ORAL NIGHTLY
Status: DISCONTINUED | OUTPATIENT
Start: 2024-10-03 | End: 2024-10-09 | Stop reason: HOSPADM

## 2024-10-03 RX ORDER — REGADENOSON 0.08 MG/ML
0.4 INJECTION, SOLUTION INTRAVENOUS
Status: COMPLETED | OUTPATIENT
Start: 2024-10-03 | End: 2024-10-03

## 2024-10-03 RX ADMIN — ROSUVASTATIN CALCIUM 10 MG: 10 TABLET, FILM COATED ORAL at 20:40

## 2024-10-03 RX ADMIN — FAMOTIDINE 20 MG: 20 TABLET, FILM COATED ORAL at 20:40

## 2024-10-03 RX ADMIN — TETROFOSMIN 1 DOSE: 1.38 INJECTION, POWDER, LYOPHILIZED, FOR SOLUTION INTRAVENOUS at 06:30

## 2024-10-03 RX ADMIN — SODIUM BICARBONATE 1300 MG: 650 TABLET, ORALLY DISINTEGRATING ORAL at 15:37

## 2024-10-03 RX ADMIN — AMLODIPINE BESYLATE 10 MG: 10 TABLET ORAL at 20:40

## 2024-10-03 RX ADMIN — FAMOTIDINE 20 MG: 20 TABLET, FILM COATED ORAL at 10:57

## 2024-10-03 RX ADMIN — TEMAZEPAM 30 MG: 7.5 CAPSULE ORAL at 20:40

## 2024-10-03 RX ADMIN — MEMANTINE HYDROCHLORIDE 10 MG: 10 TABLET, FILM COATED ORAL at 20:40

## 2024-10-03 RX ADMIN — REGADENOSON 0.4 MG: 0.08 INJECTION, SOLUTION INTRAVENOUS at 09:34

## 2024-10-03 RX ADMIN — RIVASTIGMINE TARTRATE 6 MG: 1.5 CAPSULE ORAL at 10:59

## 2024-10-03 RX ADMIN — RIVASTIGMINE TARTRATE 6 MG: 1.5 CAPSULE ORAL at 17:02

## 2024-10-03 RX ADMIN — ASPIRIN 81 MG: 81 TABLET, COATED ORAL at 20:40

## 2024-10-03 RX ADMIN — CEFTRIAXONE SODIUM 1000 MG: 1 INJECTION, POWDER, FOR SOLUTION INTRAMUSCULAR; INTRAVENOUS at 11:01

## 2024-10-03 RX ADMIN — MEMANTINE HYDROCHLORIDE 10 MG: 10 TABLET, FILM COATED ORAL at 10:58

## 2024-10-03 RX ADMIN — SODIUM BICARBONATE 1300 MG: 650 TABLET, ORALLY DISINTEGRATING ORAL at 11:00

## 2024-10-03 RX ADMIN — METOPROLOL SUCCINATE 25 MG: 25 TABLET, EXTENDED RELEASE ORAL at 10:58

## 2024-10-03 RX ADMIN — CALCIUM POLYCARBOPHIL 625 MG: 625 TABLET, FILM COATED ORAL at 10:58

## 2024-10-03 RX ADMIN — TETROFOSMIN 1 DOSE: 1.38 INJECTION, POWDER, LYOPHILIZED, FOR SOLUTION INTRAVENOUS at 09:34

## 2024-10-03 NOTE — PROGRESS NOTES
[facility] Progress Note      Patient Name: Anita Frankel  : 1940  MRN: [id number]  Primary Care Physician:  Rowan Bauer MD  Date of admission: 2024    Subjective     Interval History:   Follow-up on SHARON on CKD    Low grade fever again today  Low left back pain slightly better  No chest pain, SOA, or N/V; appetite is good  UOP not fully quantified, though voiding fine per daughter    Review of Systems:   As noted above    Objective     Vitals:   Temp:  [98.2 °F (36.8 °C)-100.6 °F (38.1 °C)] 100.6 °F (38.1 °C)  Heart Rate:  [] 92  Resp:  [17-18] 18  BP: (129-152)/(59-67) 152/62    Intake/Output Summary (Last 24 hours) at 10/3/2024 1041  Last data filed at 10/3/2024 0700  Gross per 24 hour   Intake 480 ml   Output 800 ml   Net -320 ml       Physical Exam:    General Appearance: alert, not oriented, NAD  Skin: warm and dry  HEENT: oral mucosa normal, nonicteric sclera, mucous membrane moist  Neck: supple, no JVD  Lungs: Bibasilar crackles, fewer than yesterday, nonlabored on RA  Heart: Irregularly irregular, tachycardic, no murmur, no rub  Abdomen: soft, nontender, nondistended, + BS  : no palpable bladder  Extremities: Trace doughy BLE edema, no cyanosis or clubbing  Neuro: normal speech and mental status     Scheduled Meds:     amiodarone, 150 mg, Intravenous, Once  amLODIPine, 5 mg, Oral, Nightly  aspirin, 81 mg, Oral, Nightly  cefTRIAXone, 1,000 mg, Intravenous, Q24H  famotidine, 20 mg, Oral, BID  memantine, 10 mg, Oral, Q12H  metoprolol succinate XL, 25 mg, Oral, Q24H  polycarbophil, 625 mg, Oral, Daily  rivastigmine, 6 mg, Oral, BID With Meals  sodium bicarbonate, 1,300 mg, Oral, TID      IV Meds:          Results Reviewed:   I have personally reviewed the results from the time of this admission to 10/3/2024 10:41 EDT     Results from last 7 days   Lab Units 10/03/24  0416 10/02/24  0327 10/01/24  0248 24  1408   SODIUM mmol/L 137 137 136 135*    POTASSIUM mmol/L 4.0 3.9 3.6 4.0   CHLORIDE mmol/L 106 108* 104 101   CO2 mmol/L 18.7* 17.0* 16.4* 17.0*   BUN mg/dL 25* 32* 43* 53*   CREATININE mg/dL 1.56* 1.65* 2.15* 2.97*   CALCIUM mg/dL 9.4 9.4 9.2 9.8   BILIRUBIN mg/dL  --  0.3 0.3 0.5   ALK PHOS U/L  --  123* 156* 159*   ALT (SGPT) U/L  --  12 14 15   AST (SGOT) U/L  --  16 20 22   GLUCOSE mg/dL 95 92 94 109*       Estimated Creatinine Clearance: 24.8 mL/min (A) (by C-G formula based on SCr of 1.56 mg/dL (H)).    Results from last 7 days   Lab Units 10/03/24  0416 10/02/24  0327 10/01/24  0248   MAGNESIUM mg/dL  --  2.1 1.5*   PHOSPHORUS mg/dL 2.9 2.5 2.9       Results from last 7 days   Lab Units 10/03/24  0416 10/02/24  0327 10/01/24  0248   URIC ACID mg/dL 9.1* 9.7* 8.9*       Results from last 7 days   Lab Units 10/03/24  0416 10/02/24  0327 10/01/24  0248 09/30/24  1408   WBC 10*3/mm3 13.06* 14.14* 15.88* 17.12*   HEMOGLOBIN g/dL 10.0* 9.3* 9.4* 10.6*   PLATELETS 10*3/mm3 306 288 270 267             Assessment / Plan     ASSESSMENT:  SHARON on likely CKD, improving:  prerenal from decline in oral intake, use of irbesartan limiting renal autoregulation, and infection in the urinary tract. Hypovolemia resolved with IVF; normal potassium and likely NAGMA. Urine sodium-avid. Difficult to interpret UA given squamous cells.  Despite significant pyuria and bacteriuria, she has no urinary complaints other than low back pain  UTI/fever/left renal cystic mass by CT scan, on IV antibiotics;  plans further imaging  Dementia  Hypertension, BP acceptable on amlodipine 10 mg daily  SVT, resolved without amiodarone drip, echo (10/2) showed EF 74.6% with RVSP 43mmHg  Anemia, hgb stable     PLAN:  MRI of the abdomen  Continue oral sodium bicarbonate  Surveillance labs  Discussed with daughter at bedside    Thank you for involving us in the care of Anita Frankel.  Please feel free to call with any questions.    Gomez Mesa MD  10/03/24  10:41 EDT    Nephrology  [facility]  [phone]    Please note that portions of this note were completed with a voice recognition program.

## 2024-10-03 NOTE — PROGRESS NOTES
"[facility]  Cardiology Progress Note    Patient Identification:  Name: Anita Frankel  Age: 84 y.o.  Sex: female  :  1940  MRN: [id number]                 Follow Up / Chief Complaint: follow up for svt    Interval History: Resting in bed.  No chest pain or shortness of breath.  Stress test pending.          Objective:    Past Medical History:  Past Medical History:   Diagnosis Date    Hypertension     Kidney stone      Past Surgical History:  Past Surgical History:   Procedure Laterality Date    ABDOMINAL SURGERY      polyp removal    CHOLECYSTECTOMY      HYSTERECTOMY      TONSILLECTOMY          Social History:   Social History     Tobacco Use    Smoking status: Never     Passive exposure: Past    Smokeless tobacco: Never   Substance Use Topics    Alcohol use: Yes     Comment: very rarely      Family History:  History reviewed. No pertinent family history.       Allergies:  No Known Allergies  Scheduled Meds:  amiodarone, 150 mg, Once  amLODIPine, 5 mg, Nightly  aspirin, 81 mg, Nightly  cefTRIAXone, 1,000 mg, Q24H  famotidine, 20 mg, BID  memantine, 10 mg, Q12H  metoprolol succinate XL, 25 mg, Q24H  polycarbophil, 625 mg, Daily  rivastigmine, 6 mg, BID With Meals  sodium bicarbonate, 1,300 mg, TID            INTAKE AND OUTPUT:    Intake/Output Summary (Last 24 hours) at 10/3/2024 1250  Last data filed at 10/3/2024 1101  Gross per 24 hour   Intake 580 ml   Output 800 ml   Net -220 ml       ROS  Constitutional: Awake and alert, no fever. No nosebleeds  Abdomen           no abdominal pain   Cardiac              no chest pain  Pulmonary          no shortness of breath      /67 (BP Location: Right arm, Patient Position: Lying)   Pulse 92   Temp 99.1 °F (37.3 °C) (Oral)   Resp 16   Ht 160 cm (63\")   Wt 73.5 kg (162 lb)   SpO2 98%   BMI 28.70 kg/m²   General appearance: No acute changes   Neck: Trachea midline; NECK, supple, no thyromegaly or lymphadenopathy   Lungs: Normal size and " shape, normal breath sounds, equal distribution of air, no rales or rhonchi   CV: S1-S2 regular, no murmurs, no rub, no gallop   Abdomen: Soft, nontender; no masses , no abnormal abdominal sounds   Extremities: No deformity , normal color , no peripheral edema   Skin: Normal temperature, turgor and texture; no rash, ulcers        I reviewed the patient's new clinical results, and personally reviewed and interpreted the patient's ECG and telemetry data from the last 24 hours          Cardiographics       ECG:      :                   Echocardiogram:        Interpretation Summary         Left ventricular systolic function is hyperdynamic (EF > 70%). Calculated left ventricular EF = 74.6%    The following left ventricular wall segments are hyperkinetic: basal anterior, basal anterolateral, basal inferolateral, basal inferior, basal inferoseptal, basal anteroseptal, mid anterior, mid anterolateral, mid inferolateral, mid inferior, mid inferoseptal, mid anteroseptal, apical anterior, apical lateral, apical inferior, apical septal and apex.    Left ventricular diastolic function was normal.    Moderate tricuspid valve regurgitation is present.    Estimated right ventricular systolic pressure from tricuspid regurgitation is mildly elevated (35-45 mmHg). Calculated right ventricular systolic pressure from tricuspid regurgitation is 43 mmHg.        Stress test pending     Imaging     Narrative & Impression   CT ABDOMEN PELVIS WO CONTRAST-     DATE OF EXAM: 9/30/2024 2:32 PM     INDICATION: Left flank pain, suspect kidney stone.     COMPARISON: Chest radiographs 8/21/2018.     TECHNIQUE: Multiple contiguous axial images were acquired through the  abdomen and pelvis without the intravenous administration of contrast.  Reformatted coronal and sagittal sequences were also reviewed. Radiation  dose reduction techniques were utilized, including automated exposure  control and exposure modulation based on body size.      FINDINGS:  Multifocal bibasilar subsegmental atelectasis and/or scarring. Partially  imaged calcified coronary artery disease. Aortic valve calcifications.     Incompletely evaluated 1.5 cm low-density lesion in the right lobe of  the liver, statistically representing a hepatic cyst or hemangioma.  Status post cholecystectomy. The spleen is unremarkable in limited  noncontrast CT appearance. Fatty atrophy of the pancreas. Nonspecific  but possibly benign low-density nodular thickening of both adrenal  glands. Complex bilobed heterogeneous predominantly low-attenuation  masslike collection in the upper pole of the left kidney and extending  posteriorly into the retroperitoneal fat, measuring approximately 8.5 cm  x 5 cm in axial dimensions (axial series 2 image 51) and 7 cm  craniocaudal (coronal series 4 image 74). Tiny 2 mm nonobstructing  calcification in the lower pole the right kidney. The right kidney is  otherwise unremarkable in limited noncontrast CT appearance. No  obstructing renal or ureteral stone is identified. No hydronephrosis or  hydroureter. The urinary bladder is nondistended. Status post  hysterectomy. The adnexa not definitively identified.     Mild colorectal stool. Colonic diverticula, without CT evidence of  diverticulitis. Mild diffuse colonic wall thickening could be  accentuated by underdistention but could reflect a nonspecific colitis.  No bowel obstruction. The appendix is normal.     No free fluid in the abdomen or pelvis. No free intraperitoneal air. No  pathologically enlarged lymph nodes are identified, although evaluation  is mildly limited by the lack of intravenous contrast. Moderate to  severe calcified atherosclerotic disease in the abdominal aorta and its  distal branches without aneurysm.     Mild diffuse anasarca. Rectus abdominis diastases. Anterior lower  abdominal wall surgical scar. Diffuse osteopenia. Transitional  lumbosacral vertebral anatomy with partially  lumbarized S1 vertebral  segment and rudimentary S1-S2 disc space. Nonspecific sclerotic foci in  the right T10 transverse process and the right side of the sacrum at the  S4 vertebral level, possible benign bone islands. Mild to moderate  bilateral hip and SI joint DJD and mild to moderate DJD of the pubic  symphysis with chondrocalcinosis. No acute osseous abnormality is  identified.     IMPRESSION:     1. Nonspecific complex bilobed heterogeneous predominately  low-attenuation masslike collection in the upper pole of the left kidney  and extending posteriorly into the retroperitoneal fat. Findings could  represent a primary renal neoplasm or possible pyelonephritis with  intrarenal and perirenal abscess. Correlate with urinalysis. Could  consider further evaluation with focused ultrasound or dedicated pre and  postcontrast CT or MRI if clinically indicated.  2. Mild diffuse anasarca.  3. Incompletely evaluated 1.5 cm low-density lesion in the right lobe of  the liver, statistically representing a hepatic cyst or hemangioma.  Recommend attention on follow-up.  4. Sclerotic foci in the right T10 transverse process and right side of  the sacrum, possible benign bone islands. Could consider further  evaluation with routine outpatient nuclear medicine bone scan if  clinically indicated.  5. Mild diffuse colonic wall thickening could be accentuated by  underdistention but could reflect a nonspecific colitis.     This report was finalized on 9/30/2024 3:41 PM by Dom Dalton MD on  Workstation: [id number]     Lab Review   Results from last 7 days   Lab Units 10/03/24  0416 10/02/24  2035 10/02/24  1824   HSTROP T ng/L 25* 24* 24*     Results from last 7 days   Lab Units 10/02/24  0327   MAGNESIUM mg/dL 2.1     Results from last 7 days   Lab Units 10/03/24  0416   SODIUM mmol/L 137   POTASSIUM mmol/L 4.0   BUN mg/dL 25*   CREATININE mg/dL 1.56*   CALCIUM mg/dL 9.4     @[id number]@  Results from last 7 days   Lab  Units 10/03/24  0416 10/02/24  0327 10/01/24  0248   WBC 10*3/mm3 13.06* 14.14* 15.88*   HEMOGLOBIN g/dL 10.0* 9.3* 9.4*   HEMATOCRIT % 30.6* 28.4* 29.8*   PLATELETS 10*3/mm3 306 288 270      10/2/24             10/1/24    Interpretation Summary         Left ventricular systolic function is hyperdynamic (EF > 70%). Calculated left ventricular EF = 74.6%    The following left ventricular wall segments are hyperkinetic: basal anterior, basal anterolateral, basal inferolateral, basal inferior, basal inferoseptal, basal anteroseptal, mid anterior, mid anterolateral, mid inferolateral, mid inferior, mid inferoseptal, mid anteroseptal, apical anterior, apical lateral, apical inferior, apical septal and apex.    Left ventricular diastolic function was normal.    Moderate tricuspid valve regurgitation is present.    Estimated right ventricular systolic pressure from tricuspid regurgitation is mildly elevated (35-45 mmHg). Calculated right ventricular systolic pressure from tricuspid regurgitation is 43 mmHg.       Assessment:    SHARON versus CKD: Nephrology following  Left renal cystic mass  SVT on 10/1  Dementia   hypertension   Anemia  hypomagnesia      Plan:  -Low-grade fever noted.,  Defer to primary  -Monitor reviewed and yesterday did appear to be some artifact.  -Blood pressure slightly elevated prior to medications.  Tolerating metoprolol succinate.  -Echo with EF 74.6%, several walls hypokinetic.  LV diastolic function normal.  Moderate TV regurgitation.  Mildly elevated RVSP.    -Further recommendations once stress test has been completed.    It was explained to the patient that stress testing carries 85% specificity/sensitivity, and does not rule out future cardiac event.       )10/3/2024  Jeny Rodriguez, GLENN  84 years old admitted with atrial fibrillation now back to normal sinus rhythm had a stress test which is positive considering patient has no symptoms patient will be treated conservatively    If patient  "has a symptoms of angina diagnostic heart catheter will be considered  Braden James MD      EMR Dragon/Transcription:   \"Dictated utilizing Dragon dictation\".     "

## 2024-10-03 NOTE — PROGRESS NOTES
"   LOS: 2 days   Patient Care Team:  Rowan Bauer MD as PCP - General (Internal Medicine)  Braden James MD as Consulting Physician (Cardiology)      Subjective   Interval History: Comfortable. No issues overnight. No flank pain.    Objective     ROS   12 POINT NEG ROS PERTINENT IN HPI      Vital Signs  Temp:  [98.2 °F (36.8 °C)-100.6 °F (38.1 °C)] 100.6 °F (38.1 °C)  Heart Rate:  [] 92  Resp:  [17-18] 18  BP: (129-152)/(59-67) 152/62      Intake/Output Summary (Last 24 hours) at 10/3/2024 1054  Last data filed at 10/3/2024 0700  Gross per 24 hour   Intake 480 ml   Output 800 ml   Net -320 ml       Flowsheet Rows      Flowsheet Row First Filed Value   Admission Height 160 cm (63\") Documented at 09/30/2024 2020   Admission Weight 72.6 kg (160 lb) Documented at 09/30/2024 2021            Physical Exam:     General appearance: alert, cooperative, oriented  Skin:  Skin color, texture, turgor normal, no rashes        Lab Results (all)       Procedure Component Value Units Date/Time    Renal Function Panel [[id number]]  (Abnormal) Collected: 10/03/24 0416    Specimen: Blood Updated: 10/03/24 0529     Glucose 95 mg/dL      BUN 25 mg/dL      Creatinine 1.56 mg/dL      Sodium 137 mmol/L      Potassium 4.0 mmol/L      Chloride 106 mmol/L      CO2 18.7 mmol/L      Calcium 9.4 mg/dL      Albumin 3.1 g/dL      Phosphorus 2.9 mg/dL      Anion Gap 12.3 mmol/L      BUN/Creatinine Ratio 16.0     eGFR 32.2 mL/min/1.73     Narrative:      GFR Normal >60  Chronic Kidney Disease <60  Kidney Failure <15    The GFR formula is only valid for adults with stable renal function between ages 18 and 70.    Uric Acid [[id number]]  (Abnormal) Collected: 10/03/24 0416    Specimen: Blood Updated: 10/03/24 0529     Uric Acid 9.1 mg/dL     High Sensitivity Troponin T [[id number]]  (Abnormal) Collected: 10/03/24 0416    Specimen: Blood Updated: 10/03/24 0529     HS Troponin T 25 ng/L     Narrative:      High Sensitive Troponin " T Reference Range:  <14.0 ng/L- Negative Female for AMI  <22.0 ng/L- Negative Male for AMI  >=14 - Abnormal Female indicating possible myocardial injury.  >=22 - Abnormal Male indicating possible myocardial injury.   Clinicians would have to utilize clinical acumen, EKG, Troponin, and serial changes to determine if it is an Acute Myocardial Infarction or myocardial injury due to an underlying chronic condition.         CBC & Differential [[id number]]  (Abnormal) Collected: 10/03/24 0416    Specimen: Blood Updated: 10/03/24 0502    Narrative:      The following orders were created for panel order CBC & Differential.  Procedure                               Abnormality         Status                     ---------                               -----------         ------                     CBC Auto Differential[[id number]]        Abnormal            Final result                 Please view results for these tests on the individual orders.    CBC Auto Differential [[id number]]  (Abnormal) Collected: 10/03/24 0416    Specimen: Blood Updated: 10/03/24 0502     WBC 13.06 10*3/mm3      RBC 3.37 10*6/mm3      Hemoglobin 10.0 g/dL      Hematocrit 30.6 %      MCV 90.8 fL      MCH 29.7 pg      MCHC 32.7 g/dL      RDW 12.0 %      RDW-SD 39.5 fl      MPV 10.4 fL      Platelets 306 10*3/mm3      Neutrophil % 77.5 %      Lymphocyte % 10.2 %      Monocyte % 8.6 %      Eosinophil % 0.8 %      Basophil % 0.2 %      Immature Grans % 2.7 %      Neutrophils, Absolute 10.13 10*3/mm3      Lymphocytes, Absolute 1.33 10*3/mm3      Monocytes, Absolute 1.12 10*3/mm3      Eosinophils, Absolute 0.10 10*3/mm3      Basophils, Absolute 0.03 10*3/mm3      Immature Grans, Absolute 0.35 10*3/mm3      nRBC 0.0 /100 WBC     High Sensitivity Troponin T 2Hr [[id number]]  (Abnormal) Collected: 10/02/24 2035    Specimen: Blood Updated: 10/02/24 2202     HS Troponin T 24 ng/L      Troponin T Delta 0 ng/L     Narrative:      High Sensitive Troponin T Reference  Range:  <14.0 ng/L- Negative Female for AMI  <22.0 ng/L- Negative Male for AMI  >=14 - Abnormal Female indicating possible myocardial injury.  >=22 - Abnormal Male indicating possible myocardial injury.   Clinicians would have to utilize clinical acumen, EKG, Troponin, and serial changes to determine if it is an Acute Myocardial Infarction or myocardial injury due to an underlying chronic condition.         High Sensitivity Troponin T [[id number]]  (Abnormal) Collected: 10/02/24 1824    Specimen: Blood Updated: 10/02/24 1907     HS Troponin T 24 ng/L     Narrative:      High Sensitive Troponin T Reference Range:  <14.0 ng/L- Negative Female for AMI  <22.0 ng/L- Negative Male for AMI  >=14 - Abnormal Female indicating possible myocardial injury.  >=22 - Abnormal Male indicating possible myocardial injury.   Clinicians would have to utilize clinical acumen, EKG, Troponin, and serial changes to determine if it is an Acute Myocardial Infarction or myocardial injury due to an underlying chronic condition.         Blood Culture - Blood, Arm, Left [[id number]]  (Normal) Collected: 09/30/24 1622    Specimen: Blood from Arm, Left Updated: 10/02/24 1645     Blood Culture No growth at 2 days    Blood Culture - Blood, Arm, Right [[id number]]  (Normal) Collected: 09/30/24 1631    Specimen: Blood from Arm, Right Updated: 10/02/24 1645     Blood Culture No growth at 2 days    Urine Culture - Urine, Straight Cath [[id number]] Collected: 09/30/24 1610    Specimen: Urine from Straight Cath Updated: 10/02/24 1644    Magnesium [[id number]]  (Normal) Collected: 10/02/24 0327    Specimen: Blood Updated: 10/02/24 0415     Magnesium 2.1 mg/dL     Phosphorus [[id number]]  (Normal) Collected: 10/02/24 0327    Specimen: Blood Updated: 10/02/24 0413     Phosphorus 2.5 mg/dL     Comprehensive Metabolic Panel [[id number]]  (Abnormal) Collected: 10/02/24 0327    Specimen: Blood Updated: 10/02/24 0413     Glucose 92 mg/dL      BUN 32 mg/dL       Creatinine 1.65 mg/dL      Sodium 137 mmol/L      Potassium 3.9 mmol/L      Chloride 108 mmol/L      CO2 17.0 mmol/L      Calcium 9.4 mg/dL      Total Protein 6.4 g/dL      Albumin 3.2 g/dL      ALT (SGPT) 12 U/L      AST (SGOT) 16 U/L      Alkaline Phosphatase 123 U/L      Total Bilirubin 0.3 mg/dL      Globulin 3.2 gm/dL      A/G Ratio 1.0 g/dL      BUN/Creatinine Ratio 19.4     Anion Gap 12.0 mmol/L      eGFR 30.1 mL/min/1.73     Narrative:      GFR Normal >60  Chronic Kidney Disease <60  Kidney Failure <15    The GFR formula is only valid for adults with stable renal function between ages 18 and 70.    Uric Acid [[id number]]  (Abnormal) Collected: 10/02/24 0327    Specimen: Blood Updated: 10/02/24 0413     Uric Acid 9.7 mg/dL     CBC & Differential [[id number]]  (Abnormal) Collected: 10/02/24 0327    Specimen: Blood Updated: 10/02/24 0353    Narrative:      The following orders were created for panel order CBC & Differential.  Procedure                               Abnormality         Status                     ---------                               -----------         ------                     CBC Auto Differential[[id number]]        Abnormal            Final result                 Please view results for these tests on the individual orders.    CBC Auto Differential [[id number]]  (Abnormal) Collected: 10/02/24 0327    Specimen: Blood Updated: 10/02/24 0353     WBC 14.14 10*3/mm3      RBC 3.18 10*6/mm3      Hemoglobin 9.3 g/dL      Hematocrit 28.4 %      MCV 89.3 fL      MCH 29.2 pg      MCHC 32.7 g/dL      RDW 12.0 %      RDW-SD 38.6 fl      MPV 10.5 fL      Platelets 288 10*3/mm3      Neutrophil % 76.8 %      Lymphocyte % 10.7 %      Monocyte % 9.3 %      Eosinophil % 0.5 %      Basophil % 0.2 %      Immature Grans % 2.5 %      Neutrophils, Absolute 10.86 10*3/mm3      Lymphocytes, Absolute 1.51 10*3/mm3      Monocytes, Absolute 1.32 10*3/mm3      Eosinophils, Absolute 0.07 10*3/mm3      Basophils, Absolute  0.03 10*3/mm3      Immature Grans, Absolute 0.35 10*3/mm3      nRBC 0.0 /100 WBC     TSH [[id number]]  (Normal) Collected: 10/01/24 0248    Specimen: Blood Updated: 10/01/24 0348     TSH 0.701 uIU/mL     Comprehensive Metabolic Panel [[id number]]  (Abnormal) Collected: 10/01/24 0248    Specimen: Blood Updated: 10/01/24 0345     Glucose 94 mg/dL      BUN 43 mg/dL      Creatinine 2.15 mg/dL      Sodium 136 mmol/L      Potassium 3.6 mmol/L      Chloride 104 mmol/L      CO2 16.4 mmol/L      Calcium 9.2 mg/dL      Total Protein 6.4 g/dL      Albumin 3.2 g/dL      ALT (SGPT) 14 U/L      AST (SGOT) 20 U/L      Alkaline Phosphatase 156 U/L      Total Bilirubin 0.3 mg/dL      Globulin 3.2 gm/dL      A/G Ratio 1.0 g/dL      BUN/Creatinine Ratio 20.0     Anion Gap 15.6 mmol/L      eGFR 21.9 mL/min/1.73     Narrative:      GFR Normal >60  Chronic Kidney Disease <60  Kidney Failure <15    The GFR formula is only valid for adults with stable renal function between ages 18 and 70.    Lipid Panel [[id number]] Collected: 10/01/24 0248    Specimen: Blood Updated: 10/01/24 0345     Total Cholesterol 129 mg/dL      Triglycerides 94 mg/dL      HDL Cholesterol 43 mg/dL      LDL Cholesterol  68 mg/dL      VLDL Cholesterol 18 mg/dL      LDL/HDL Ratio 1.56    Narrative:      Cholesterol Reference Ranges  (U.S. Department of Health and Human Services ATP III Classifications)    Desirable          <200 mg/dL  Borderline High    200-239 mg/dL  High Risk          >240 mg/dL      Triglyceride Reference Ranges  (U.S. Department of Health and Human Services ATP III Classifications)    Normal           <150 mg/dL  Borderline High  150-199 mg/dL  High             200-499 mg/dL  Very High        >500 mg/dL    HDL Reference Ranges  (U.S. Department of Health and Human Services ATP III Classifications)    Low     <40 mg/dl (major risk factor for CHD)  High    >60 mg/dl ('negative' risk factor for CHD)        LDL Reference Ranges  (U.S. Department of  Health and Human Services ATP III Classifications)    Optimal          <100 mg/dL  Near Optimal     100-129 mg/dL  Borderline High  130-159 mg/dL  High             160-189 mg/dL  Very High        >189 mg/dL    Magnesium [[id number]]  (Abnormal) Collected: 10/01/24 0248    Specimen: Blood Updated: 10/01/24 0345     Magnesium 1.5 mg/dL     Uric Acid [[id number]]  (Abnormal) Collected: 10/01/24 0248    Specimen: Blood Updated: 10/01/24 0345     Uric Acid 8.9 mg/dL     Phosphorus [[id number]]  (Normal) Collected: 10/01/24 0248    Specimen: Blood Updated: 10/01/24 0345     Phosphorus 2.9 mg/dL     Hemoglobin A1c [[id number]]  (Normal) Collected: 10/01/24 0248    Specimen: Blood Updated: 10/01/24 0333     Hemoglobin A1C 5.00 %     Narrative:      Hemoglobin A1C Ranges:    Increased Risk for Diabetes  5.7% to 6.4%  Diabetes                     >= 6.5%  Diabetic Goal                < 7.0%    CBC & Differential [[id number]]  (Abnormal) Collected: 10/01/24 0248    Specimen: Blood Updated: 10/01/24 0320    Narrative:      The following orders were created for panel order CBC & Differential.  Procedure                               Abnormality         Status                     ---------                               -----------         ------                     CBC Auto Differential[[id number]]        Abnormal            Final result                 Please view results for these tests on the individual orders.    CBC Auto Differential [[id number]]  (Abnormal) Collected: 10/01/24 0248    Specimen: Blood Updated: 10/01/24 0320     WBC 15.88 10*3/mm3      RBC 3.27 10*6/mm3      Hemoglobin 9.4 g/dL      Hematocrit 29.8 %      MCV 91.1 fL      MCH 28.7 pg      MCHC 31.5 g/dL      RDW 12.4 %      RDW-SD 41.9 fl      MPV 11.1 fL      Platelets 270 10*3/mm3      Neutrophil % 82.6 %      Lymphocyte % 6.3 %      Monocyte % 9.2 %      Eosinophil % 0.1 %      Basophil % 0.2 %      Immature Grans % 1.6 %      Neutrophils, Absolute 13.12 10*3/mm3       Lymphocytes, Absolute 1.00 10*3/mm3      Monocytes, Absolute 1.46 10*3/mm3      Eosinophils, Absolute 0.01 10*3/mm3      Basophils, Absolute 0.03 10*3/mm3      Immature Grans, Absolute 0.26 10*3/mm3      nRBC 0.0 /100 WBC     BNP [[id number]]  (Normal) Collected: 09/30/24 2350    Specimen: Blood Updated: 10/01/24 0045     proBNP 1,009.0 pg/mL     Narrative:      This assay is used as an aid in the diagnosis of individuals suspected of having heart failure. It can be used as an aid in the diagnosis of acute decompensated heart failure (ADHF) in patients presenting with signs and symptoms of ADHF to the emergency department (ED). In addition, NT-proBNP of <300 pg/mL indicates ADHF is not likely.    Age Range Result Interpretation  NT-proBNP Concentration (pg/mL:      <50             Positive            >450                   Gray                 300-450                    Negative             <300    50-75           Positive            >900                  Gray                300-900                  Negative            <300      >75             Positive            >1800                  Gray                300-1800                  Negative            <300    Sodium, Urine, Random - Straight Cath [[id number]] Collected: 09/30/24 1610    Specimen: Urine from Straight Cath Updated: 09/30/24 2354     Sodium, Urine 35 mmol/L     Narrative:      Reference intervals for random urine have not been established.  Clinical usage is dependent upon physician's interpretation in combination with other laboratory tests.       Creatinine Urine Random (kidney function) GFR component - Straight Cath [[id number]] Collected: 09/30/24 1610    Specimen: Urine from Straight Cath Updated: 09/30/24 2354     Creatinine, Urine 133.6 mg/dL     Narrative:      Reference intervals for random urine have not been established.  Clinical usage is dependent upon physician's interpretation in combination with other laboratory tests.       Lactic  "Acid, Plasma [[id number]]  (Normal) Collected: 09/30/24 1631    Specimen: Blood Updated: 09/30/24 1711     Lactate 1.5 mmol/L     Urinalysis, Microscopic Only - Straight Cath [[id number]]  (Abnormal) Collected: 09/30/24 1610    Specimen: Urine from Straight Cath Updated: 09/30/24 1641     RBC, UA 0-2 /HPF      WBC, UA Too Numerous to Count /HPF      Bacteria, UA 4+ /HPF      Squamous Epithelial Cells, UA 3-6 /HPF      Hyaline Casts, UA 13-20 /LPF      Methodology Automated Microscopy    Urinalysis With Microscopic If Indicated (No Culture) - Straight Cath [[id number]]  (Abnormal) Collected: 09/30/24 1610    Specimen: Urine from Straight Cath Updated: 09/30/24 1639     Color, UA Dark Yellow     Appearance, UA Turbid     pH, UA 5.5     Specific Gravity, UA 1.015     Glucose, UA Negative     Ketones, UA Trace     Bilirubin, UA Negative     Blood, UA Small (1+)     Protein, UA 30 mg/dL (1+)     Leuk Esterase, UA Large (3+)     Nitrite, UA Positive     Urobilinogen, UA 0.2 E.U./dL    Procalcitonin [[id number]]  (Abnormal) Collected: 09/30/24 1408    Specimen: Blood Updated: 09/30/24 1636     Procalcitonin 1.19 ng/mL     Narrative:      As a Marker for Sepsis (Non-Neonates):    1. <0.5 ng/mL represents a low risk of severe sepsis and/or septic shock.  2. >2 ng/mL represents a high risk of severe sepsis and/or septic shock.    As a Marker for Lower Respiratory Tract Infections that require antibiotic therapy:    PCT on Admission    Antibiotic Therapy       6-12 Hrs later    >0.5                Strongly Recommended  >0.25 - <0.5        Recommended   0.1 - 0.25          Discouraged              Remeasure/reassess PCT  <0.1                Strongly Discouraged     Remeasure/reassess PCT    As 28 day mortality risk marker: \"Change in Procalcitonin Result\" (>80% or <=80%) if Day 0 (or Day 1) and Day 4 values are available. Refer to http://www.[facility]-pct-calculator.com    Change in PCT <=80%  A decrease of PCT levels below or equal " to 80% defines a positive change in PCT test result representing a higher risk for 28-day all-cause mortality of patients diagnosed with severe sepsis for septic shock.    Change in PCT >80%  A decrease of PCT levels of more than 80% defines a negative change in PCT result representing a lower risk for 28-day all-cause mortality of patients diagnosed with severe sepsis or septic shock.       Comprehensive Metabolic Panel [[id number]]  (Abnormal) Collected: 09/30/24 1408    Specimen: Blood Updated: 09/30/24 1445     Glucose 109 mg/dL      BUN 53 mg/dL      Creatinine 2.97 mg/dL      Sodium 135 mmol/L      Potassium 4.0 mmol/L      Comment: Slight hemolysis detected by analyzer. Result may be falsely elevated.        Chloride 101 mmol/L      CO2 17.0 mmol/L      Calcium 9.8 mg/dL      Total Protein 7.4 g/dL      Albumin 3.6 g/dL      ALT (SGPT) 15 U/L      AST (SGOT) 22 U/L      Comment: Slight hemolysis detected by analyzer. Result may be falsely elevated.        Alkaline Phosphatase 159 U/L      Total Bilirubin 0.5 mg/dL      Globulin 3.8 gm/dL      A/G Ratio 0.9 g/dL      BUN/Creatinine Ratio 17.8     Anion Gap 17.0 mmol/L      eGFR 14.9 mL/min/1.73      Comment: <15 Indicative of kidney failure       Narrative:      GFR Normal >60  Chronic Kidney Disease <60  Kidney Failure <15    The GFR formula is only valid for adults with stable renal function between ages 18 and 70.    Lipase [[id number]]  (Normal) Collected: 09/30/24 1408    Specimen: Blood Updated: 09/30/24 1444     Lipase 14 U/L     CBC & Differential [[id number]]  (Abnormal) Collected: 09/30/24 1408    Specimen: Blood Updated: 09/30/24 1420    Narrative:      The following orders were created for panel order CBC & Differential.  Procedure                               Abnormality         Status                     ---------                               -----------         ------                     CBC Auto Differential[[id number]]        Abnormal             Final result                 Please view results for these tests on the individual orders.    CBC Auto Differential [[id number]]  (Abnormal) Collected: 09/30/24 1408    Specimen: Blood Updated: 09/30/24 1420     WBC 17.12 10*3/mm3      RBC 3.51 10*6/mm3      Hemoglobin 10.6 g/dL      Hematocrit 31.9 %      MCV 90.9 fL      MCH 30.2 pg      MCHC 33.2 g/dL      RDW 12.6 %      RDW-SD 41.0 fl      MPV 11.2 fL      Platelets 267 10*3/mm3      Neutrophil % 83.5 %      Lymphocyte % 5.4 %      Monocyte % 9.5 %      Eosinophil % 0.2 %      Basophil % 0.2 %      Immature Grans % 1.2 %      Neutrophils, Absolute 14.30 10*3/mm3      Lymphocytes, Absolute 0.93 10*3/mm3      Monocytes, Absolute 1.62 10*3/mm3      Eosinophils, Absolute 0.03 10*3/mm3      Basophils, Absolute 0.03 10*3/mm3      Immature Grans, Absolute 0.21 10*3/mm3      nRBC 0.0 /100 WBC             Imaging Results (All)       Procedure Component Value Units Date/Time    US Renal Bilateral [[id number]] Collected: 09/30/24 1750     Updated: 09/30/24 1756    Narrative:      US RENAL BILATERAL-     INDICATIONS: Complex renal cyst versus mass versus infection     TECHNIQUE: ULTRASOUND OF THE KIDNEYS AND URINARY BLADDER.     COMPARISON: CT from same date     FINDINGS:     The right kidney measures 10.0 centimeters, the left kidney measures  10.6 centimeters.     Multiple left renal cysts are apparent, largest measuring 6.0 cm. No  hydronephrosis or echogenic nephrolithiasis.     The urinary bladder appears unremarkable. Bilateral ureteral jets were  observed.       Impression:      Multiple left renal cysts. No solid renal mass was identified by  ultrasound, suggest follow-up evaluation with enhanced renal imaging if  not contraindicated. No hydronephrosis or echogenic nephrolithiasis.        This report was finalized on 9/30/2024 5:53 PM by Dr. Otf Cardenas M.D on Workstation: [id number]       CT Abdomen Pelvis Without Contrast [[id number]] Collected: 09/30/24 7019      Updated: 09/30/24 1544    Narrative:      CT ABDOMEN PELVIS WO CONTRAST-     DATE OF EXAM: 9/30/2024 2:32 PM     INDICATION: Left flank pain, suspect kidney stone.     COMPARISON: Chest radiographs 8/21/2018.     TECHNIQUE: Multiple contiguous axial images were acquired through the  abdomen and pelvis without the intravenous administration of contrast.  Reformatted coronal and sagittal sequences were also reviewed. Radiation  dose reduction techniques were utilized, including automated exposure  control and exposure modulation based on body size.     FINDINGS:  Multifocal bibasilar subsegmental atelectasis and/or scarring. Partially  imaged calcified coronary artery disease. Aortic valve calcifications.     Incompletely evaluated 1.5 cm low-density lesion in the right lobe of  the liver, statistically representing a hepatic cyst or hemangioma.  Status post cholecystectomy. The spleen is unremarkable in limited  noncontrast CT appearance. Fatty atrophy of the pancreas. Nonspecific  but possibly benign low-density nodular thickening of both adrenal  glands. Complex bilobed heterogeneous predominantly low-attenuation  masslike collection in the upper pole of the left kidney and extending  posteriorly into the retroperitoneal fat, measuring approximately 8.5 cm  x 5 cm in axial dimensions (axial series 2 image 51) and 7 cm  craniocaudal (coronal series 4 image 74). Tiny 2 mm nonobstructing  calcification in the lower pole the right kidney. The right kidney is  otherwise unremarkable in limited noncontrast CT appearance. No  obstructing renal or ureteral stone is identified. No hydronephrosis or  hydroureter. The urinary bladder is nondistended. Status post  hysterectomy. The adnexa not definitively identified.     Mild colorectal stool. Colonic diverticula, without CT evidence of  diverticulitis. Mild diffuse colonic wall thickening could be  accentuated by underdistention but could reflect a nonspecific  colitis.  No bowel obstruction. The appendix is normal.     No free fluid in the abdomen or pelvis. No free intraperitoneal air. No  pathologically enlarged lymph nodes are identified, although evaluation  is mildly limited by the lack of intravenous contrast. Moderate to  severe calcified atherosclerotic disease in the abdominal aorta and its  distal branches without aneurysm.     Mild diffuse anasarca. Rectus abdominis diastases. Anterior lower  abdominal wall surgical scar. Diffuse osteopenia. Transitional  lumbosacral vertebral anatomy with partially lumbarized S1 vertebral  segment and rudimentary S1-S2 disc space. Nonspecific sclerotic foci in  the right T10 transverse process and the right side of the sacrum at the  S4 vertebral level, possible benign bone islands. Mild to moderate  bilateral hip and SI joint DJD and mild to moderate DJD of the pubic  symphysis with chondrocalcinosis. No acute osseous abnormality is  identified.       Impression:         1. Nonspecific complex bilobed heterogeneous predominately  low-attenuation masslike collection in the upper pole of the left kidney  and extending posteriorly into the retroperitoneal fat. Findings could  represent a primary renal neoplasm or possible pyelonephritis with  intrarenal and perirenal abscess. Correlate with urinalysis. Could  consider further evaluation with focused ultrasound or dedicated pre and  postcontrast CT or MRI if clinically indicated.  2. Mild diffuse anasarca.  3. Incompletely evaluated 1.5 cm low-density lesion in the right lobe of  the liver, statistically representing a hepatic cyst or hemangioma.  Recommend attention on follow-up.  4. Sclerotic foci in the right T10 transverse process and right side of  the sacrum, possible benign bone islands. Could consider further  evaluation with routine outpatient nuclear medicine bone scan if  clinically indicated.  5. Mild diffuse colonic wall thickening could be accentuated  by  underdistention but could reflect a nonspecific colitis.     This report was finalized on 2024 3:41 PM by Dom Dalton MD on  Workstation: [id number]               Medication Review:   Current Facility-Administered Medications   Medication Dose Route Frequency Provider Last Rate Last Admin    amiodarone 150 mg in 100 mL D5W (loading dose)  150 mg Intravenous Once Braden James MD        amLODIPine (NORVASC) tablet 5 mg  5 mg Oral Nightly Jeny Rodriguez APRN        aspirin EC tablet 81 mg  81 mg Oral Nightly Roger Rush MD   81 mg at 10/02/24 2058    cefTRIAXone (ROCEPHIN) 1,000 mg in sodium chloride 0.9 % 100 mL MBP  1,000 mg Intravenous Q24H Roger Rush  mL/hr at 10/02/24 1357 1,000 mg at 10/02/24 135    famotidine (PEPCID) tablet 20 mg  20 mg Oral BID Roger Rush MD   20 mg at 10/02/24 2058    memantine (NAMENDA) tablet 10 mg  10 mg Oral Q12H Roger Rush MD   10 mg at 10/02/24 2058    metoprolol succinate XL (TOPROL-XL) 24 hr tablet 25 mg  25 mg Oral Q24H Jeny Rodriguez APRN   25 mg at 10/02/24 2059    polycarbophil tablet 625 mg  625 mg Oral Daily Roger Rush MD   625 mg at 10/02/24 135    rivastigmine (EXELON) capsule 6 mg  6 mg Oral BID With Meals Roger Rush MD   6 mg at 10/02/24 1745    sodium bicarbonate tablet 1,300 mg  1,300 mg Oral TID Gomez Mesa MD   1,300 mg at 10/02/24 2059    sodium chloride 0.9 % flush 10 mL  10 mL Intravenous PRN Baldo Dominguez MD        temazepam (RESTORIL) capsule 30 mg  30 mg Oral Nightly PRN Roger Rush MD           Current Facility-Administered Medications:     amiodarone 150 mg in 100 mL D5W (loading dose), 150 mg, Intravenous, Once **FOLLOWED BY** [] amiodarone 360 mg in 200 mL D5W infusion, 1 mg/min, Intravenous, Continuous **FOLLOWED BY** [] amiodarone 360 mg in 200 mL D5W infusion, 0.5 mg/min, Intravenous, Continuous, Braden James MD    amLODIPine (NORVASC) tablet 5 mg, 5 mg,  Oral, Nightly, Jeny Rodriguez APRN    aspirin EC tablet 81 mg, 81 mg, Oral, Nightly, Roger Rush MD, 81 mg at 10/02/24 2058    cefTRIAXone (ROCEPHIN) 1,000 mg in sodium chloride 0.9 % 100 mL MBP, 1,000 mg, Intravenous, Q24H, Roger Rush MD, Last Rate: 200 mL/hr at 10/02/24 1357, 1,000 mg at 10/02/24 1357    famotidine (PEPCID) tablet 20 mg, 20 mg, Oral, BID, Roger Rush MD, 20 mg at 10/02/24 2058    memantine (NAMENDA) tablet 10 mg, 10 mg, Oral, Q12H, Roger Rush MD, 10 mg at 10/02/24 2058    metoprolol succinate XL (TOPROL-XL) 24 hr tablet 25 mg, 25 mg, Oral, Q24H, Jeny Rodriguez APRN, 25 mg at 10/02/24 2059    polycarbophil tablet 625 mg, 625 mg, Oral, Daily, Roger Rush MD, 625 mg at 10/02/24 1356    rivastigmine (EXELON) capsule 6 mg, 6 mg, Oral, BID With Meals, Roger Rush MD, 6 mg at 10/02/24 1745    sodium bicarbonate tablet 1,300 mg, 1,300 mg, Oral, TID, Gomez Mesa MD, 1,300 mg at 10/02/24 2059    [COMPLETED] Insert Peripheral IV, , , Once **AND** sodium chloride 0.9 % flush 10 mL, 10 mL, Intravenous, PRN, AlbertoBaldo ortiz MD    temazepam (RESTORIL) capsule 30 mg, 30 mg, Oral, Nightly PRN, Roger Rush MD  Medications Discontinued During This Encounter   Medication Reason    losartan (COZAAR) tablet 100 mg     ferrous sulfate tablet 325 mg     sodium chloride 0.9 % bolus 1,000 mL     sodium chloride 0.9 % infusion     sodium bicarbonate tablet 650 mg     potassium chloride (K-DUR,KLOR-CON) ER tablet 40 mEq     amLODIPine (NORVASC) tablet 10 mg        Assessment & Plan         SHARON (acute kidney injury)        Plan   - Left renal mass of uncertain significance. Patient has clinically improved and has downtrending creatinine and WBC.   - will arrange renal mass MRI for next week and follow up thereafter  - we will plan to follow remotely. Please call with questions or concerns.    Forest Young Jr., MD  10/03/24  10:54 EDT

## 2024-10-03 NOTE — PLAN OF CARE
Problem: Adult Inpatient Plan of Care  Goal: Plan of Care Review  Outcome: Progressing  Flowsheets (Taken 10/3/2024 0545)  Progress: improving  Plan of Care Reviewed With: patient  Goal: Patient-Specific Goal (Individualized)  Outcome: Progressing  Goal: Absence of Hospital-Acquired Illness or Injury  Outcome: Progressing  Intervention: Identify and Manage Fall Risk  Recent Flowsheet Documentation  Taken 10/3/2024 0200 by Teri Mendoza RN  Safety Promotion/Fall Prevention: safety round/check completed  Taken 10/3/2024 0000 by Teri Mendoza RN  Safety Promotion/Fall Prevention:   activity supervised   safety round/check completed  Taken 10/2/2024 2200 by Teri Mendoza RN  Safety Promotion/Fall Prevention:   activity supervised   safety round/check completed  Taken 10/2/2024 2000 by Teri Mendoza RN  Safety Promotion/Fall Prevention:   activity supervised   safety round/check completed   fall prevention program maintained   lighting adjusted   nonskid shoes/slippers when out of bed   room organization consistent   clutter free environment maintained   assistive device/personal items within reach  Intervention: Prevent Skin Injury  Recent Flowsheet Documentation  Taken 10/3/2024 0200 by Teri Mendoza RN  Body Position:   position changed independently   supine, legs elevated  Taken 10/3/2024 0000 by Teri Mendoza RN  Body Position:   position changed independently   tilted   left  Taken 10/2/2024 2200 by Teri Mendoza RN  Body Position:   position changed independently   supine  Taken 10/2/2024 2000 by Teri Mendoza RN  Body Position:   position changed independently   tilted   left  Intervention: Prevent and Manage VTE (Venous Thromboembolism) Risk  Recent Flowsheet Documentation  Taken 10/3/2024 0000 by Teri Mendoza RN  Activity Management: activity encouraged  Taken 10/2/2024 2200 by Teri Mendoza RN  Activity Management: activity encouraged  Taken 10/2/2024 2000 by Teri Mendoza RN  Range of Motion:  active ROM (range of motion) encouraged  Intervention: Prevent Infection  Recent Flowsheet Documentation  Taken 10/3/2024 0200 by Teri Mendoza RN  Infection Prevention: single patient room provided  Taken 10/3/2024 0000 by Teri Mendoza RN  Infection Prevention:   single patient room provided   rest/sleep promoted   hand hygiene promoted   environmental surveillance performed  Taken 10/2/2024 2200 by Teri Mendoza RN  Infection Prevention:   single patient room provided   rest/sleep promoted   hand hygiene promoted   environmental surveillance performed  Taken 10/2/2024 2000 by Teri Mendoza RN  Infection Prevention:   single patient room provided   rest/sleep promoted   hand hygiene promoted  Goal: Optimal Comfort and Wellbeing  Outcome: Progressing  Intervention: Provide Person-Centered Care  Recent Flowsheet Documentation  Taken 10/2/2024 2000 by Teri Mendoza RN  Trust Relationship/Rapport:   care explained   choices provided   emotional support provided   questions answered  Goal: Readiness for Transition of Care  Outcome: Progressing     Problem: Hypertension Comorbidity  Goal: Blood Pressure in Desired Range  Outcome: Progressing  Intervention: Maintain Blood Pressure Management  Recent Flowsheet Documentation  Taken 10/2/2024 2000 by Teri Mendoza RN  Medication Review/Management: medications reviewed     Problem: Skin Injury Risk Increased  Goal: Skin Health and Integrity  Outcome: Progressing  Intervention: Optimize Skin Protection  Recent Flowsheet Documentation  Taken 10/3/2024 0200 by Teri Mendoza RN  Head of Bed (HOB) Positioning: HOB at 30-45 degrees  Taken 10/3/2024 0000 by Teri Mendoza RN  Head of Bed (HOB) Positioning: HOB elevated  Taken 10/2/2024 2200 by Teri Mendoza RN  Head of Bed (HOB) Positioning: HOB at 20-30 degrees  Taken 10/2/2024 2000 by Teri Mendoza RN  Head of Bed (HOB) Positioning: HOB elevated     Problem: Fall Injury Risk  Goal: Absence of Fall and Fall-Related  Injury  Outcome: Progressing  Intervention: Identify and Manage Contributors  Recent Flowsheet Documentation  Taken 10/2/2024 2000 by Teri Mendoza RN  Medication Review/Management: medications reviewed  Intervention: Promote Injury-Free Environment  Recent Flowsheet Documentation  Taken 10/3/2024 0200 by Teri Mendoza RN  Safety Promotion/Fall Prevention: safety round/check completed  Taken 10/3/2024 0000 by Teri Mendoza RN  Safety Promotion/Fall Prevention:   activity supervised   safety round/check completed  Taken 10/2/2024 2200 by Teri Mendoza RN  Safety Promotion/Fall Prevention:   activity supervised   safety round/check completed  Taken 10/2/2024 2000 by Teri Mendoza RN  Safety Promotion/Fall Prevention:   activity supervised   safety round/check completed   fall prevention program maintained   lighting adjusted   nonskid shoes/slippers when out of bed   room organization consistent   clutter free environment maintained   assistive device/personal items within reach   Goal Outcome Evaluation:  Plan of Care Reviewed With: patient        Progress: improving     No any events overnight / patient NPO p/m for stress test this am / family at the bedside very supportive / MD dAri lanier last evening for low fever / VS stable /

## 2024-10-03 NOTE — PROGRESS NOTES
"Daily progress note    Primary care physician  Dr. Bauer    Subjective  Doing better with no new complaint and family at bedside    History of present illness  84-year-old female with history of hypertension and dementia who lives by herself presented to [facility] emergency room with left flank pain for last few days which is getting worse.  Patient also complained of nausea but no vomiting diarrhea.  Patient also denies any fever chills chest pain shortness of breath palpitation.  Patient workup in ER revealed acute kidney injury and left cystic renal mass admitted for management.      REVIEW OF SYSTEMS  Unremarkable except generalized weakness     PHYSICAL EXAM   Blood pressure 150/67, pulse 92, temperature 99.1 °F (37.3 °C), temperature source Oral, resp. rate 16, height 160 cm (63\"), weight 73.5 kg (162 lb), SpO2 98%.    GENERAL: Alert well-appearing female no obvious distress.   SKIN: Warm, dry  HENT: Normocephalic, atraumatic  EYES: no scleral icterus  CV: regular rhythm, regular rate  RESPIRATORY: normal effort, moving air bilaterally  ABDOMEN: soft, nontender, nondistended bowel sounds positive  MUSCULOSKELETAL: no deformity  NEURO: alert, moves all extremities, follows commands     LAB RESULTS  Lab Results (last 24 hours)       Procedure Component Value Units Date/Time    Urine Culture - Urine, Straight Cath [[id number]]  (Abnormal) Collected: 09/30/24 1610    Specimen: Urine from Straight Cath Updated: 10/03/24 1135     Urine Culture >100,000 CFU/mL Escherichia coli    Narrative:      Colonization of the urinary tract without infection is common. Treatment is discouraged unless the patient is symptomatic, pregnant, or undergoing an invasive urologic procedure.    Renal Function Panel [[id number]]  (Abnormal) Collected: 10/03/24 0416    Specimen: Blood Updated: 10/03/24 0529     Glucose 95 mg/dL      BUN 25 mg/dL      Creatinine 1.56 mg/dL      Sodium 137 mmol/L      Potassium 4.0 mmol/L     "  Chloride 106 mmol/L      CO2 18.7 mmol/L      Calcium 9.4 mg/dL      Albumin 3.1 g/dL      Phosphorus 2.9 mg/dL      Anion Gap 12.3 mmol/L      BUN/Creatinine Ratio 16.0     eGFR 32.2 mL/min/1.73     Narrative:      GFR Normal >60  Chronic Kidney Disease <60  Kidney Failure <15    The GFR formula is only valid for adults with stable renal function between ages 18 and 70.    Uric Acid [[id number]]  (Abnormal) Collected: 10/03/24 0416    Specimen: Blood Updated: 10/03/24 0529     Uric Acid 9.1 mg/dL     High Sensitivity Troponin T [[id number]]  (Abnormal) Collected: 10/03/24 0416    Specimen: Blood Updated: 10/03/24 0529     HS Troponin T 25 ng/L     Narrative:      High Sensitive Troponin T Reference Range:  <14.0 ng/L- Negative Female for AMI  <22.0 ng/L- Negative Male for AMI  >=14 - Abnormal Female indicating possible myocardial injury.  >=22 - Abnormal Male indicating possible myocardial injury.   Clinicians would have to utilize clinical acumen, EKG, Troponin, and serial changes to determine if it is an Acute Myocardial Infarction or myocardial injury due to an underlying chronic condition.         CBC & Differential [[id number]]  (Abnormal) Collected: 10/03/24 0416    Specimen: Blood Updated: 10/03/24 0502    Narrative:      The following orders were created for panel order CBC & Differential.  Procedure                               Abnormality         Status                     ---------                               -----------         ------                     CBC Auto Differential[[id number]]        Abnormal            Final result                 Please view results for these tests on the individual orders.    CBC Auto Differential [[id number]]  (Abnormal) Collected: 10/03/24 0416    Specimen: Blood Updated: 10/03/24 0502     WBC 13.06 10*3/mm3      RBC 3.37 10*6/mm3      Hemoglobin 10.0 g/dL      Hematocrit 30.6 %      MCV 90.8 fL      MCH 29.7 pg      MCHC 32.7 g/dL      RDW 12.0 %      RDW-SD 39.5 fl       MPV 10.4 fL      Platelets 306 10*3/mm3      Neutrophil % 77.5 %      Lymphocyte % 10.2 %      Monocyte % 8.6 %      Eosinophil % 0.8 %      Basophil % 0.2 %      Immature Grans % 2.7 %      Neutrophils, Absolute 10.13 10*3/mm3      Lymphocytes, Absolute 1.33 10*3/mm3      Monocytes, Absolute 1.12 10*3/mm3      Eosinophils, Absolute 0.10 10*3/mm3      Basophils, Absolute 0.03 10*3/mm3      Immature Grans, Absolute 0.35 10*3/mm3      nRBC 0.0 /100 WBC     High Sensitivity Troponin T 2Hr [[id number]]  (Abnormal) Collected: 10/02/24 2035    Specimen: Blood Updated: 10/02/24 2202     HS Troponin T 24 ng/L      Troponin T Delta 0 ng/L     Narrative:      High Sensitive Troponin T Reference Range:  <14.0 ng/L- Negative Female for AMI  <22.0 ng/L- Negative Male for AMI  >=14 - Abnormal Female indicating possible myocardial injury.  >=22 - Abnormal Male indicating possible myocardial injury.   Clinicians would have to utilize clinical acumen, EKG, Troponin, and serial changes to determine if it is an Acute Myocardial Infarction or myocardial injury due to an underlying chronic condition.         High Sensitivity Troponin T [[id number]]  (Abnormal) Collected: 10/02/24 1824    Specimen: Blood Updated: 10/02/24 1907     HS Troponin T 24 ng/L     Narrative:      High Sensitive Troponin T Reference Range:  <14.0 ng/L- Negative Female for AMI  <22.0 ng/L- Negative Male for AMI  >=14 - Abnormal Female indicating possible myocardial injury.  >=22 - Abnormal Male indicating possible myocardial injury.   Clinicians would have to utilize clinical acumen, EKG, Troponin, and serial changes to determine if it is an Acute Myocardial Infarction or myocardial injury due to an underlying chronic condition.         Blood Culture - Blood, Arm, Left [[id number]]  (Normal) Collected: 09/30/24 1622    Specimen: Blood from Arm, Left Updated: 10/02/24 1645     Blood Culture No growth at 2 days    Blood Culture - Blood, Arm, Right [[id number]]  (Normal)  Collected: 24 1631    Specimen: Blood from Arm, Right Updated: 10/02/24 1645     Blood Culture No growth at 2 days          Imaging Results (Last 24 Hours)       ** No results found for the last 24 hours. **            Current Facility-Administered Medications:     amiodarone 150 mg in 100 mL D5W (loading dose), 150 mg, Intravenous, Once **FOLLOWED BY** [] amiodarone 360 mg in 200 mL D5W infusion, 1 mg/min, Intravenous, Continuous **FOLLOWED BY** [] amiodarone 360 mg in 200 mL D5W infusion, 0.5 mg/min, Intravenous, Continuous, Braden James MD    amLODIPine (NORVASC) tablet 5 mg, 5 mg, Oral, Nightly, Jeny Rodriguez APRN    aspirin EC tablet 81 mg, 81 mg, Oral, Nightly, Roger Rush MD, 81 mg at 10/02/24 2058    cefTRIAXone (ROCEPHIN) 1,000 mg in sodium chloride 0.9 % 100 mL MBP, 1,000 mg, Intravenous, Q24H, Roger Rush MD, Last Rate: 200 mL/hr at 10/03/24 1101, 1,000 mg at 10/03/24 1101    famotidine (PEPCID) tablet 20 mg, 20 mg, Oral, BID, Roger Rush MD, 20 mg at 10/03/24 1057    memantine (NAMENDA) tablet 10 mg, 10 mg, Oral, Q12H, Roger Rush MD, 10 mg at 10/03/24 1058    metoprolol succinate XL (TOPROL-XL) 24 hr tablet 25 mg, 25 mg, Oral, Q24H, Jeny Rodriguez APRN, 25 mg at 10/03/24 1058    polycarbophil tablet 625 mg, 625 mg, Oral, Daily, Roger Rush MD, 625 mg at 10/03/24 1058    rivastigmine (EXELON) capsule 6 mg, 6 mg, Oral, BID With Meals, Roger Rush MD, 6 mg at 10/03/24 1059    sodium bicarbonate tablet 1,300 mg, 1,300 mg, Oral, TID, Gomez Mesa MD, 1,300 mg at 10/03/24 1100    [COMPLETED] Insert Peripheral IV, , , Once **AND** sodium chloride 0.9 % flush 10 mL, 10 mL, Intravenous, PRN, AlbertoBaldo ortiz MD    temazepam (RESTORIL) capsule 30 mg, 30 mg, Oral, Nightly PRN, Roger Rush MD     ASSESSMENT  Acute kidney injury resolving  Left cystic renal mass  Supraventricular tachycardia  Acute E. coli  UTI  Hypertension  Dementia    PLAN  CPM  Continue IVF  Continue IV antibiotics  Control the heart rate  Check MRI of the abdomen  Stress Cardiolite today  Cardiology consult appreciated  Nephrology and urology to follow patient  Adjust home medications  Stress ulcer DVT prophylaxis  Supportive care  PT/OT  Discussed with family nursing staff  Follow closely further recommendation current hospital course    LATONIA VELASQUEZ MD    Copied text in this note has been reviewed and is accurate as of 10/03/24

## 2024-10-04 ENCOUNTER — APPOINTMENT (OUTPATIENT)
Dept: MRI IMAGING | Facility: HOSPITAL | Age: 84
End: 2024-10-04
Payer: MEDICARE

## 2024-10-04 LAB
ALBUMIN SERPL-MCNC: 3.1 G/DL (ref 3.5–5.2)
ANION GAP SERPL CALCULATED.3IONS-SCNC: 13.6 MMOL/L (ref 5–15)
BACTERIA SPEC AEROBE CULT: ABNORMAL
BASOPHILS # BLD AUTO: 0.04 10*3/MM3 (ref 0–0.2)
BASOPHILS NFR BLD AUTO: 0.3 % (ref 0–1.5)
BUN SERPL-MCNC: 21 MG/DL (ref 8–23)
BUN/CREAT SERPL: 13.8 (ref 7–25)
CALCIUM SPEC-SCNC: 9.3 MG/DL (ref 8.6–10.5)
CHLORIDE SERPL-SCNC: 103 MMOL/L (ref 98–107)
CO2 SERPL-SCNC: 20.4 MMOL/L (ref 22–29)
CREAT SERPL-MCNC: 1.52 MG/DL (ref 0.57–1)
DEPRECATED RDW RBC AUTO: 36.5 FL (ref 37–54)
EGFRCR SERPLBLD CKD-EPI 2021: 33.3 ML/MIN/1.73
EOSINOPHIL # BLD AUTO: 0.27 10*3/MM3 (ref 0–0.4)
EOSINOPHIL NFR BLD AUTO: 1.7 % (ref 0.3–6.2)
ERYTHROCYTE [DISTWIDTH] IN BLOOD BY AUTOMATED COUNT: 11.7 % (ref 12.3–15.4)
GLUCOSE SERPL-MCNC: 92 MG/DL (ref 65–99)
HCT VFR BLD AUTO: 28.9 % (ref 34–46.6)
HGB BLD-MCNC: 9.7 G/DL (ref 12–15.9)
IMM GRANULOCYTES # BLD AUTO: 0.36 10*3/MM3 (ref 0–0.05)
IMM GRANULOCYTES NFR BLD AUTO: 2.3 % (ref 0–0.5)
LYMPHOCYTES # BLD AUTO: 2.09 10*3/MM3 (ref 0.7–3.1)
LYMPHOCYTES NFR BLD AUTO: 13.1 % (ref 19.6–45.3)
MAGNESIUM SERPL-MCNC: 1.8 MG/DL (ref 1.6–2.4)
MCH RBC QN AUTO: 29.8 PG (ref 26.6–33)
MCHC RBC AUTO-ENTMCNC: 33.6 G/DL (ref 31.5–35.7)
MCV RBC AUTO: 88.9 FL (ref 79–97)
MONOCYTES # BLD AUTO: 1.29 10*3/MM3 (ref 0.1–0.9)
MONOCYTES NFR BLD AUTO: 8.1 % (ref 5–12)
NEUTROPHILS NFR BLD AUTO: 11.88 10*3/MM3 (ref 1.7–7)
NEUTROPHILS NFR BLD AUTO: 74.5 % (ref 42.7–76)
NRBC BLD AUTO-RTO: 0 /100 WBC (ref 0–0.2)
PHOSPHATE SERPL-MCNC: 3.4 MG/DL (ref 2.5–4.5)
PLATELET # BLD AUTO: 369 10*3/MM3 (ref 140–450)
PMV BLD AUTO: 10.9 FL (ref 6–12)
POTASSIUM SERPL-SCNC: 3.5 MMOL/L (ref 3.5–5.2)
RBC # BLD AUTO: 3.25 10*6/MM3 (ref 3.77–5.28)
SODIUM SERPL-SCNC: 137 MMOL/L (ref 136–145)
WBC NRBC COR # BLD AUTO: 15.93 10*3/MM3 (ref 3.4–10.8)

## 2024-10-04 PROCEDURE — 83735 ASSAY OF MAGNESIUM: CPT | Performed by: INTERNAL MEDICINE

## 2024-10-04 PROCEDURE — 85025 COMPLETE CBC W/AUTO DIFF WBC: CPT | Performed by: HOSPITALIST

## 2024-10-04 PROCEDURE — 97116 GAIT TRAINING THERAPY: CPT

## 2024-10-04 PROCEDURE — 74183 MRI ABD W/O CNTR FLWD CNTR: CPT

## 2024-10-04 PROCEDURE — 97535 SELF CARE MNGMENT TRAINING: CPT

## 2024-10-04 PROCEDURE — 80069 RENAL FUNCTION PANEL: CPT | Performed by: INTERNAL MEDICINE

## 2024-10-04 PROCEDURE — 99232 SBSQ HOSP IP/OBS MODERATE 35: CPT | Performed by: INTERNAL MEDICINE

## 2024-10-04 PROCEDURE — A9577 INJ MULTIHANCE: HCPCS | Performed by: HOSPITALIST

## 2024-10-04 PROCEDURE — 25010000002 CEFTRIAXONE PER 250 MG: Performed by: HOSPITALIST

## 2024-10-04 PROCEDURE — 0 GADOBENATE DIMEGLUMINE 529 MG/ML SOLUTION: Performed by: HOSPITALIST

## 2024-10-04 RX ORDER — SODIUM BICARBONATE 650 MG/1
650 TABLET ORAL 3 TIMES DAILY
Status: DISCONTINUED | OUTPATIENT
Start: 2024-10-04 | End: 2024-10-09 | Stop reason: HOSPADM

## 2024-10-04 RX ADMIN — SODIUM BICARBONATE 650 MG: 650 TABLET ORAL at 22:58

## 2024-10-04 RX ADMIN — SODIUM BICARBONATE 1300 MG: 650 TABLET, ORALLY DISINTEGRATING ORAL at 15:11

## 2024-10-04 RX ADMIN — GADOBENATE DIMEGLUMINE 14 ML: 529 INJECTION, SOLUTION INTRAVENOUS at 22:16

## 2024-10-04 RX ADMIN — ASPIRIN 81 MG: 81 TABLET, COATED ORAL at 22:58

## 2024-10-04 RX ADMIN — SODIUM BICARBONATE 1300 MG: 650 TABLET, ORALLY DISINTEGRATING ORAL at 09:23

## 2024-10-04 RX ADMIN — ROSUVASTATIN CALCIUM 10 MG: 10 TABLET, FILM COATED ORAL at 22:58

## 2024-10-04 RX ADMIN — MEMANTINE HYDROCHLORIDE 10 MG: 10 TABLET, FILM COATED ORAL at 22:57

## 2024-10-04 RX ADMIN — RIVASTIGMINE TARTRATE 6 MG: 1.5 CAPSULE ORAL at 09:23

## 2024-10-04 RX ADMIN — AMLODIPINE BESYLATE 10 MG: 10 TABLET ORAL at 22:57

## 2024-10-04 RX ADMIN — RIVASTIGMINE TARTRATE 6 MG: 1.5 CAPSULE ORAL at 19:01

## 2024-10-04 RX ADMIN — CEFTRIAXONE SODIUM 1000 MG: 1 INJECTION, POWDER, FOR SOLUTION INTRAMUSCULAR; INTRAVENOUS at 12:08

## 2024-10-04 RX ADMIN — FAMOTIDINE 20 MG: 20 TABLET, FILM COATED ORAL at 09:24

## 2024-10-04 RX ADMIN — METOPROLOL SUCCINATE 25 MG: 25 TABLET, EXTENDED RELEASE ORAL at 09:23

## 2024-10-04 RX ADMIN — CALCIUM POLYCARBOPHIL 625 MG: 625 TABLET, FILM COATED ORAL at 12:08

## 2024-10-04 RX ADMIN — FAMOTIDINE 20 MG: 20 TABLET, FILM COATED ORAL at 22:57

## 2024-10-04 RX ADMIN — MEMANTINE HYDROCHLORIDE 10 MG: 10 TABLET, FILM COATED ORAL at 09:23

## 2024-10-04 NOTE — PROGRESS NOTES
"Daily progress note    Primary care physician  Dr. Bauer    Subjective  Doing better with no new complaint and family at bedside.  Patient wants to go home.    History of present illness  84-year-old female with history of hypertension and dementia who lives by herself presented to [facility] emergency room with left flank pain for last few days which is getting worse.  Patient also complained of nausea but no vomiting diarrhea.  Patient also denies any fever chills chest pain shortness of breath palpitation.  Patient workup in ER revealed acute kidney injury and left cystic renal mass admitted for management.      REVIEW OF SYSTEMS  Unremarkable except generalized weakness     PHYSICAL EXAM   Blood pressure 134/57, pulse 87, temperature 98.4 °F (36.9 °C), temperature source Oral, resp. rate 16, height 160 cm (63\"), weight 73.5 kg (162 lb), SpO2 98%.    GENERAL: Alert well-appearing female no obvious distress.   SKIN: Warm, dry  HENT: Normocephalic, atraumatic  EYES: no scleral icterus  CV: regular rhythm, regular rate  RESPIRATORY: normal effort, moving air bilaterally  ABDOMEN: soft, nontender, nondistended bowel sounds positive  MUSCULOSKELETAL: no deformity  NEURO: alert, moves all extremities, follows commands     LAB RESULTS  Lab Results (last 24 hours)       Procedure Component Value Units Date/Time    Urine Culture - Urine, Straight Cath [[id number]]  (Abnormal)  (Susceptibility) Collected: 09/30/24 1610    Specimen: Urine from Straight Cath Updated: 10/04/24 0858     Urine Culture >100,000 CFU/mL Escherichia coli    Narrative:      Colonization of the urinary tract without infection is common. Treatment is discouraged unless the patient is symptomatic, pregnant, or undergoing an invasive urologic procedure.    Susceptibility        Escherichia coli      TIO      Amoxicillin + Clavulanate Susceptible      Ampicillin Resistant      Ampicillin + Sulbactam Intermediate      Cefazolin Susceptible "      Cefepime Susceptible      Ceftazidime Susceptible      Ceftriaxone Susceptible      Gentamicin Susceptible      Levofloxacin Susceptible      Nitrofurantoin Susceptible      Piperacillin + Tazobactam Susceptible      Trimethoprim + Sulfamethoxazole Susceptible                           Magnesium [[id number]]  (Normal) Collected: 10/04/24 0448    Specimen: Blood Updated: 10/04/24 0616     Magnesium 1.8 mg/dL     Renal Function Panel [[id number]]  (Abnormal) Collected: 10/04/24 0448    Specimen: Blood Updated: 10/04/24 0616     Glucose 92 mg/dL      BUN 21 mg/dL      Creatinine 1.52 mg/dL      Sodium 137 mmol/L      Potassium 3.5 mmol/L      Chloride 103 mmol/L      CO2 20.4 mmol/L      Calcium 9.3 mg/dL      Albumin 3.1 g/dL      Phosphorus 3.4 mg/dL      Anion Gap 13.6 mmol/L      BUN/Creatinine Ratio 13.8     eGFR 33.3 mL/min/1.73     Narrative:      GFR Normal >60  Chronic Kidney Disease <60  Kidney Failure <15    The GFR formula is only valid for adults with stable renal function between ages 18 and 70.    CBC & Differential [[id number]]  (Abnormal) Collected: 10/04/24 0448    Specimen: Blood Updated: 10/04/24 0554    Narrative:      The following orders were created for panel order CBC & Differential.  Procedure                               Abnormality         Status                     ---------                               -----------         ------                     CBC Auto Differential[[id number]]        Abnormal            Final result                 Please view results for these tests on the individual orders.    CBC Auto Differential [[id number]]  (Abnormal) Collected: 10/04/24 0448    Specimen: Blood Updated: 10/04/24 0554     WBC 15.93 10*3/mm3      RBC 3.25 10*6/mm3      Hemoglobin 9.7 g/dL      Hematocrit 28.9 %      MCV 88.9 fL      MCH 29.8 pg      MCHC 33.6 g/dL      RDW 11.7 %      RDW-SD 36.5 fl      MPV 10.9 fL      Platelets 369 10*3/mm3      Neutrophil % 74.5 %      Lymphocyte % 13.1 %       Monocyte % 8.1 %      Eosinophil % 1.7 %      Basophil % 0.3 %      Immature Grans % 2.3 %      Neutrophils, Absolute 11.88 10*3/mm3      Lymphocytes, Absolute 2.09 10*3/mm3      Monocytes, Absolute 1.29 10*3/mm3      Eosinophils, Absolute 0.27 10*3/mm3      Basophils, Absolute 0.04 10*3/mm3      Immature Grans, Absolute 0.36 10*3/mm3      nRBC 0.0 /100 WBC     Blood Culture - Blood, Arm, Left [[id number]]  (Normal) Collected: 24 1622    Specimen: Blood from Arm, Left Updated: 10/03/24 1645     Blood Culture No growth at 3 days    Blood Culture - Blood, Arm, Right [[id number]]  (Normal) Collected: 24 1631    Specimen: Blood from Arm, Right Updated: 10/03/24 164     Blood Culture No growth at 3 days          Imaging Results (Last 24 Hours)       ** No results found for the last 24 hours. **            Current Facility-Administered Medications:     amiodarone 150 mg in 100 mL D5W (loading dose), 150 mg, Intravenous, Once **FOLLOWED BY** [] amiodarone 360 mg in 200 mL D5W infusion, 1 mg/min, Intravenous, Continuous **FOLLOWED BY** [] amiodarone 360 mg in 200 mL D5W infusion, 0.5 mg/min, Intravenous, Continuous, Braden James MD    amLODIPine (NORVASC) tablet 10 mg, 10 mg, Oral, Nightly, Roger Rush MD, 10 mg at 10/03/24 2040    aspirin EC tablet 81 mg, 81 mg, Oral, Nightly, Roger Rush MD, 81 mg at 10/03/24 2040    cefTRIAXone (ROCEPHIN) 1,000 mg in sodium chloride 0.9 % 100 mL MBP, 1,000 mg, Intravenous, Q24H, Roger Rush MD, Last Rate: 200 mL/hr at 10/04/24 1208, 1,000 mg at 10/04/24 1208    famotidine (PEPCID) tablet 20 mg, 20 mg, Oral, BID, Roger Rush MD, 20 mg at 10/04/24 0924    memantine (NAMENDA) tablet 10 mg, 10 mg, Oral, Q12H, Roger Rush MD, 10 mg at 10/04/24 0923    metoprolol succinate XL (TOPROL-XL) 24 hr tablet 25 mg, 25 mg, Oral, Q24H, Jeny Rodriguez APRN, 25 mg at 10/04/24 0923    polycarbophil tablet 625 mg, 625 mg, Oral, Daily, Roger Rush MD,  625 mg at 10/04/24 1208    rivastigmine (EXELON) capsule 6 mg, 6 mg, Oral, BID With Meals, Latonia Rush MD, 6 mg at 10/04/24 0923    rosuvastatin (CRESTOR) tablet 10 mg, 10 mg, Oral, Nightly, Jeny Rodriguez APRN, 10 mg at 10/03/24 2040    sodium bicarbonate tablet 1,300 mg, 1,300 mg, Oral, TID, Gomez Mesa MD, 1,300 mg at 10/04/24 1511    [COMPLETED] Insert Peripheral IV, , , Once **AND** sodium chloride 0.9 % flush 10 mL, 10 mL, Intravenous, PRN, MontgomeryBaldo ortiz MD    temazepam (RESTORIL) capsule 30 mg, 30 mg, Oral, Nightly PRN, Latonia Rush MD, 30 mg at 10/03/24 2040     ASSESSMENT  Acute kidney injury resolving  Left cystic renal mass  Supraventricular tachycardia  Acute E. coli UTI  Hypertension  Dementia    PLAN  CPM  Continue IV antibiotics  Control the heart rate  Check MRI of the abdomen  Cardiology nephrology and urology to follow patient  Adjust home medications  Stress ulcer DVT prophylaxis  Supportive care  PT/OT  Discussed with family nursing staff  Not safe for discharge as her white count going up and does not look stable    LATONIA RUSH MD    Copied text in this note has been reviewed and is accurate as of 10/04/24

## 2024-10-04 NOTE — PLAN OF CARE
Goal Outcome Evaluation:  Plan of Care Reviewed With: patient, family        Progress: improving  Outcome Evaluation: pt seen this AM for OT, she is agreeable, reports fatigue and still reports feeling below her baseline. Her plan is to go stay with her daughter at d/c for a few days. She required increased time for bed mobility and func mob this date, mostly CGA with rwx to BR commode and sinkside. Min A for brief mgment, hygiene with SBA while seated, sinkside g/h while standing. Some short standing rest breaks required throughout ADLs this date. She will continue to benefit from skilled OT to address deficits and maximize (I) prior to d/c home with daughter.      Anticipated Discharge Disposition (OT): home, home with assist

## 2024-10-04 NOTE — PROGRESS NOTES
First urology progress note:    Low-grade temperatures.  Patient feels okay.  Appears more ill than previous on exam.  Urinating.  MRI pending    Assessment plan:    84-year-old female with a  1.  Left cystic mass; new and undiagnosed  2.  Acute cystitis; new and undiagnosed  3.  Acute pyelonephritis; new and undiagnosed    WBC: 15  Creatinine: 1.5    -MRI pending  -Urine culture greater than 100,000 E. coli; no sensitivities?  on Rocephin

## 2024-10-04 NOTE — PROGRESS NOTES
"[facility]  Cardiology Progress Note    Patient Identification:  Name: Anita Frankel  Age: 84 y.o.  Sex: female  :  1940  MRN: [id number]                 Follow Up / Chief Complaint: follow up for svt    Interval History: resting in bed. No chest pain or shortness of breath.             Objective:    Past Medical History:  Past Medical History:   Diagnosis Date    Hypertension     Kidney stone      Past Surgical History:  Past Surgical History:   Procedure Laterality Date    ABDOMINAL SURGERY      polyp removal    CHOLECYSTECTOMY      HYSTERECTOMY      TONSILLECTOMY          Social History:   Social History     Tobacco Use    Smoking status: Never     Passive exposure: Past    Smokeless tobacco: Never   Substance Use Topics    Alcohol use: Yes     Comment: very rarely      Family History:  History reviewed. No pertinent family history.       Allergies:  No Known Allergies  Scheduled Meds:  amiodarone, 150 mg, Once  amLODIPine, 10 mg, Nightly  aspirin, 81 mg, Nightly  cefTRIAXone, 1,000 mg, Q24H  famotidine, 20 mg, BID  memantine, 10 mg, Q12H  metoprolol succinate XL, 25 mg, Q24H  polycarbophil, 625 mg, Daily  rivastigmine, 6 mg, BID With Meals  rosuvastatin, 10 mg, Nightly  sodium bicarbonate, 1,300 mg, TID            INTAKE AND OUTPUT:    Intake/Output Summary (Last 24 hours) at 10/4/2024 1510  Last data filed at 10/4/2024 0950  Gross per 24 hour   Intake 240 ml   Output --   Net 240 ml       ROS  Constitutional: Awake and alert, no fever. No nosebleeds  Abdomen           no abdominal pain   Cardiac              no chest pain  Pulmonary          no shortness of breath      /57 (BP Location: Right arm, Patient Position: Lying)   Pulse 87   Temp 98.4 °F (36.9 °C) (Oral)   Resp 16   Ht 160 cm (63\")   Wt 73.5 kg (162 lb)   SpO2 98%   BMI 28.70 kg/m²   General appearance: No acute changes   Neck: Trachea midline; NECK, supple, no thyromegaly or lymphadenopathy   Lungs: Normal size " and shape, normal breath sounds, equal distribution of air, no rales or rhonchi   CV: S1-S2 regular, no murmurs, no rub, no gallop   Abdomen: Soft, nontender; no masses , no abnormal abdominal sounds   Extremities: No deformity , normal color , no peripheral edema   Skin: Normal temperature, turgor and texture; no rash, ulcers        I reviewed the patient's new clinical results, and personally reviewed and interpreted the patient's ECG and telemetry data from the last 24 hours          Cardiographics       ECG:      :                   Echocardiogram:        Interpretation Summary         Left ventricular systolic function is hyperdynamic (EF > 70%). Calculated left ventricular EF = 74.6%    The following left ventricular wall segments are hyperkinetic: basal anterior, basal anterolateral, basal inferolateral, basal inferior, basal inferoseptal, basal anteroseptal, mid anterior, mid anterolateral, mid inferolateral, mid inferior, mid inferoseptal, mid anteroseptal, apical anterior, apical lateral, apical inferior, apical septal and apex.    Left ventricular diastolic function was normal.    Moderate tricuspid valve regurgitation is present.    Estimated right ventricular systolic pressure from tricuspid regurgitation is mildly elevated (35-45 mmHg). Calculated right ventricular systolic pressure from tricuspid regurgitation is 43 mmHg.        Stress test pending     Imaging     Narrative & Impression   CT ABDOMEN PELVIS WO CONTRAST-     DATE OF EXAM: 9/30/2024 2:32 PM     INDICATION: Left flank pain, suspect kidney stone.     COMPARISON: Chest radiographs 8/21/2018.     TECHNIQUE: Multiple contiguous axial images were acquired through the  abdomen and pelvis without the intravenous administration of contrast.  Reformatted coronal and sagittal sequences were also reviewed. Radiation  dose reduction techniques were utilized, including automated exposure  control and exposure modulation based on body size.      FINDINGS:  Multifocal bibasilar subsegmental atelectasis and/or scarring. Partially  imaged calcified coronary artery disease. Aortic valve calcifications.     Incompletely evaluated 1.5 cm low-density lesion in the right lobe of  the liver, statistically representing a hepatic cyst or hemangioma.  Status post cholecystectomy. The spleen is unremarkable in limited  noncontrast CT appearance. Fatty atrophy of the pancreas. Nonspecific  but possibly benign low-density nodular thickening of both adrenal  glands. Complex bilobed heterogeneous predominantly low-attenuation  masslike collection in the upper pole of the left kidney and extending  posteriorly into the retroperitoneal fat, measuring approximately 8.5 cm  x 5 cm in axial dimensions (axial series 2 image 51) and 7 cm  craniocaudal (coronal series 4 image 74). Tiny 2 mm nonobstructing  calcification in the lower pole the right kidney. The right kidney is  otherwise unremarkable in limited noncontrast CT appearance. No  obstructing renal or ureteral stone is identified. No hydronephrosis or  hydroureter. The urinary bladder is nondistended. Status post  hysterectomy. The adnexa not definitively identified.     Mild colorectal stool. Colonic diverticula, without CT evidence of  diverticulitis. Mild diffuse colonic wall thickening could be  accentuated by underdistention but could reflect a nonspecific colitis.  No bowel obstruction. The appendix is normal.     No free fluid in the abdomen or pelvis. No free intraperitoneal air. No  pathologically enlarged lymph nodes are identified, although evaluation  is mildly limited by the lack of intravenous contrast. Moderate to  severe calcified atherosclerotic disease in the abdominal aorta and its  distal branches without aneurysm.     Mild diffuse anasarca. Rectus abdominis diastases. Anterior lower  abdominal wall surgical scar. Diffuse osteopenia. Transitional  lumbosacral vertebral anatomy with partially  lumbarized S1 vertebral  segment and rudimentary S1-S2 disc space. Nonspecific sclerotic foci in  the right T10 transverse process and the right side of the sacrum at the  S4 vertebral level, possible benign bone islands. Mild to moderate  bilateral hip and SI joint DJD and mild to moderate DJD of the pubic  symphysis with chondrocalcinosis. No acute osseous abnormality is  identified.     IMPRESSION:     1. Nonspecific complex bilobed heterogeneous predominately  low-attenuation masslike collection in the upper pole of the left kidney  and extending posteriorly into the retroperitoneal fat. Findings could  represent a primary renal neoplasm or possible pyelonephritis with  intrarenal and perirenal abscess. Correlate with urinalysis. Could  consider further evaluation with focused ultrasound or dedicated pre and  postcontrast CT or MRI if clinically indicated.  2. Mild diffuse anasarca.  3. Incompletely evaluated 1.5 cm low-density lesion in the right lobe of  the liver, statistically representing a hepatic cyst or hemangioma.  Recommend attention on follow-up.  4. Sclerotic foci in the right T10 transverse process and right side of  the sacrum, possible benign bone islands. Could consider further  evaluation with routine outpatient nuclear medicine bone scan if  clinically indicated.  5. Mild diffuse colonic wall thickening could be accentuated by  underdistention but could reflect a nonspecific colitis.     This report was finalized on 9/30/2024 3:41 PM by Dom Dalton MD on  Workstation: [id number]     Lab Review   Results from last 7 days   Lab Units 10/03/24  0416 10/02/24  2035 10/02/24  1824   HSTROP T ng/L 25* 24* 24*     Results from last 7 days   Lab Units 10/04/24  0448   MAGNESIUM mg/dL 1.8     Results from last 7 days   Lab Units 10/04/24  0448   SODIUM mmol/L 137   POTASSIUM mmol/L 3.5   BUN mg/dL 21   CREATININE mg/dL 1.52*   CALCIUM mg/dL 9.3     @[id number]@  Results from last 7 days   Lab  "Units 10/04/24  0448 10/03/24  0416 10/02/24  0327   WBC 10*3/mm3 15.93* 13.06* 14.14*   HEMOGLOBIN g/dL 9.7* 10.0* 9.3*   HEMATOCRIT % 28.9* 30.6* 28.4*   PLATELETS 10*3/mm3 369 306 288      10/2/24             10/1/24    Interpretation Summary         Left ventricular systolic function is hyperdynamic (EF > 70%). Calculated left ventricular EF = 74.6%    The following left ventricular wall segments are hyperkinetic: basal anterior, basal anterolateral, basal inferolateral, basal inferior, basal inferoseptal, basal anteroseptal, mid anterior, mid anterolateral, mid inferolateral, mid inferior, mid inferoseptal, mid anteroseptal, apical anterior, apical lateral, apical inferior, apical septal and apex.    Left ventricular diastolic function was normal.    Moderate tricuspid valve regurgitation is present.    Estimated right ventricular systolic pressure from tricuspid regurgitation is mildly elevated (35-45 mmHg). Calculated right ventricular systolic pressure from tricuspid regurgitation is 43 mmHg.       Assessment:    SHARON versus CKD: Nephrology following  Left renal cystic mass  SVT on 10/1  Dementia   hypertension   Anemia  hypomagnesia      Plan:  -Low-grade fever noted.,  Defer to primary  -Monitor reviewed and yesterday did appear to be some artifact.  -Blood pressure slightly elevated prior to medications.  Tolerating metoprolol succinate.  -Echo with EF 74.6%, several walls hypokinetic.  LV diastolic function normal.  Moderate TV regurgitation.  Mildly elevated RVSP.      Patient remains in normal sinus rhythm free of cardiac symptoms can be discharged and follow-up as an outpatient  Braden James MD    )10/4/2024         EMR Dragon/Transcription:   \"Dictated utilizing Dragon dictation\".     "

## 2024-10-04 NOTE — THERAPY TREATMENT NOTE
Patient Name: Anita Frankel  : 1940    MRN: [id number]                              Today's Date: 10/4/2024       Admit Date: 2024    Visit Dx:     ICD-10-CM ICD-9-CM   1. Left flank pain  R10.9 789.09   2. Acute renal failure, unspecified acute renal failure type  N17.9 584.9   3. Leukocytosis, unspecified type  D72.829 288.60   4. Abnormal CT of the abdomen  R93.5 793.6   5. SHARON (acute kidney injury)  N17.9 584.9     Patient Active Problem List   Diagnosis    SHARON (acute kidney injury)     Past Medical History:   Diagnosis Date    Hypertension     Kidney stone      Past Surgical History:   Procedure Laterality Date    ABDOMINAL SURGERY      polyp removal    CHOLECYSTECTOMY      HYSTERECTOMY      TONSILLECTOMY        General Information       Row Name 10/04/24 1710          Physical Therapy Time and Intention    Document Type therapy note (daily note)  -     Mode of Treatment physical therapy  -       Row Name 10/04/24 1710          General Information    Existing Precautions/Restrictions fall  -       Row Name 10/04/24 1710          Cognition    Orientation Status (Cognition) oriented x 3  -               User Key  (r) = Recorded By, (t) = Taken By, (c) = Cosigned By      Initials Name Provider Type     Sayda Shearer PTA Physical Therapist Assistant                   Mobility       Row Name 10/04/24 1710          Bed Mobility    Bed Mobility supine-sit;sit-supine  -     Supine-Sit [place] (Bed Mobility) supervision  -     Sit-Supine [place] (Bed Mobility) supervision  -     Assistive Device (Bed Mobility) bed rails;head of bed elevated  -       Row Name 10/04/24 1710          Sit-Stand Transfer    Sit-Stand [place] (Transfers) standby assist  -       Row Name 10/04/24 1710          Gait/Stairs (Locomotion)    [place] Level (Gait) contact guard  -     Patient was able to Ambulate yes  -     Distance in Feet (Gait) 100  -     Deviations/Abnormal  Patterns (Gait) otto decreased;stride length decreased  -               User Key  (r) = Recorded By, (t) = Taken By, (c) = Cosigned By      Initials Name Provider Type    Sayda Grijalva PTA Physical Therapist Assistant                   Obj/Interventions       Row Name 10/04/24 1710          Motor Skills    Therapeutic Exercise --  seated AP, LAQ, marches, pillow squeeze x10 reps  -               User Key  (r) = Recorded By, (t) = Taken By, (c) = Cosigned By      Initials Name Provider Type    Sayda Grijalva PTA Physical Therapist Assistant                   Goals/Plan    No documentation.                  Clinical Impression       Row Name 10/04/24 1711          Pain    Pretreatment Pain Rating 0/10 - no pain  -     Posttreatment Pain Rating 0/10 - no pain  -       Row Name 10/04/24 1711          Positioning and Restraints    Pre-Treatment Position in bed  -     Post Treatment Position bed  -SM     In Bed supine;call light within reach;encouraged to call for assist;exit alarm on;with family/caregiver  -               User Key  (r) = Recorded By, (t) = Taken By, (c) = Cosigned By      Initials Name Provider Type    Sayda Grijalva PTA Physical Therapist Assistant                   Outcome Measures       Row Name 10/04/24 1711 10/04/24 0800       How much help from another person do you currently need...    Turning from your back to your side while in flat bed without using bedrails? 4  -SM 3  -KK    Moving from lying on back to sitting on the side of a flat bed without bedrails? 4  -SM 3  -KK    Moving to and from a bed to a chair (including a wheelchair)? 3  -SM 3  -KK    Standing up from a chair using your arms (e.g., wheelchair, bedside chair)? 3  -SM 3  -KK    Climbing 3-5 steps with a railing? 3  -SM 3  -KK    To walk in hospital room? 3  -SM --    AM-PAC 6 Clicks Score (PT) 20  - --    Highest Level of Mobility Goal 6 --> Walk 10 steps or more  - --      Row Name  10/04/24 1711 10/04/24 1126       Functional Assessment    Outcome Measure Options AM-PAC 6 Clicks Basic Mobility (PT)  -[facility]-PAC 6 Clicks Daily Activity (OT)  -              User Key  (r) = Recorded By, (t) = Taken By, (c) = Cosigned By      Initials Name Provider Type    Camryn Elizabeth, RN Registered Nurse    Sayda Grijalva PTA Physical Therapist Assistant    Jackie Ellison OT Occupational Therapist                                 Physical Therapy Education       Title: PT OT SLP Therapies (Done)       Topic: Physical Therapy (Done)       Point: Mobility training (Done)       Learning Progress Summary             Patient Acceptance, E,TB,D, VU,NR by  at 10/4/2024 1711    Acceptance, E, VU,DU by  at 10/1/2024 1008                                         User Key       Initials Effective Dates Name Provider Type Discipline     03/07/18 -  Sayda Shearer PTA Physical Therapist Assistant PT    CK 08/22/24 -  Kiersten Smith PT Student PT Student PT                  PT Recommendation and Plan     Plan of Care Reviewed With: patient  Progress: improving  Outcome Evaluation: Pt tolerated treatment well this date. Required SV for bed mobility, then SBA to stand. Pt ambulated 100ft w/ no AD, CGA for safety. Pt also completed a few seated LE exercises, and encouraged pt to ambulate w/ nsg during the day.     Time Calculation:         PT Charges       Row Name 10/04/24 1713             Time Calculation    Start Time 1605  -      Stop Time 1618  -      Time Calculation (min) 13 min  -      PT Received On 10/04/24  -      PT - Next Appointment 10/07/24  -                User Key  (r) = Recorded By, (t) = Taken By, (c) = Cosigned By      Initials Name Provider Type    Sayda Grijalva PTA Physical Therapist Assistant                  Therapy Charges for Today       Code Description Service Date Service Provider Modifiers Qty    99516163329 HC GAIT TRAINING EA 15 MIN 10/4/2024  Manfred, Sayda Gonzales, PTA GP 1            PT G-Codes  Outcome Measure Options: AM-PAC 6 Clicks Basic Mobility (PT)  AM-PAC 6 Clicks Score (PT): 20  AM-PAC 6 Clicks Score (OT): 19  Modified Blanco Scale: 3 - Moderate disability.  Requiring some help, but able to walk without assistance.  PT Discharge Summary  Anticipated Discharge Disposition (PT): home with home health    Sayda Shearer, HENRY  10/4/2024

## 2024-10-04 NOTE — PLAN OF CARE
Goal Outcome Evaluation:  Plan of Care Reviewed With: patient, daughter              WBC trending upwards on am labs. MRI planned for this pm. Plan of care ongoing. No new complaints or concerns.

## 2024-10-04 NOTE — PROGRESS NOTES
[facility] Progress Note      Patient Name: Anita Frankel  : 1940  MRN: [id number]  Primary Care Physician:  Rowan Bauer MD  Date of admission: 2024    Subjective     Interval History:   Follow-up on SHARON on CKD    Low grade fever last night  Low left back pain unchanged   No chest pain, SOA, or N/V; appetite is good  UOP not fully quantified, though voiding fine per daughter    Review of Systems:   As noted above    Objective     Vitals:   Temp:  [98.2 °F (36.8 °C)-100.6 °F (38.1 °C)] 98.4 °F (36.9 °C)  Heart Rate:  [76-96] 87  Resp:  [16] 16  BP: (121-152)/(56-70) 134/57    Intake/Output Summary (Last 24 hours) at 10/4/2024 1736  Last data filed at 10/4/2024 0950  Gross per 24 hour   Intake 240 ml   Output --   Net 240 ml       Physical Exam:    General Appearance: alert, not oriented, NAD, pleasant  Skin: warm and dry  HEENT: oral mucosa normal, nonicteric sclera  Neck: supple, no JVD  Lungs: Rare basilar crackles, nonlabored on RA  Heart: Irregularly irregular, tachycardic, no murmur, no rub  Abdomen: soft, nontender, nondistended, + BS  : no palpable bladder  Extremities: Trace doughy BLE edema, no cyanosis or clubbing  Neuro: normal speech and mental status     Scheduled Meds:     amiodarone, 150 mg, Intravenous, Once  amLODIPine, 10 mg, Oral, Nightly  aspirin, 81 mg, Oral, Nightly  cefTRIAXone, 1,000 mg, Intravenous, Q24H  famotidine, 20 mg, Oral, BID  memantine, 10 mg, Oral, Q12H  metoprolol succinate XL, 25 mg, Oral, Q24H  polycarbophil, 625 mg, Oral, Daily  rivastigmine, 6 mg, Oral, BID With Meals  rosuvastatin, 10 mg, Oral, Nightly  sodium bicarbonate, 1,300 mg, Oral, TID      IV Meds:          Results Reviewed:   I have personally reviewed the results from the time of this admission to 10/4/2024 17:36 EDT     Results from last 7 days   Lab Units 10/04/24  0448 10/03/24  0416 10/02/24  0327 10/01/24  0248 24  1408   SODIUM mmol/L 137 137 137  136 135*   POTASSIUM mmol/L 3.5 4.0 3.9 3.6 4.0   CHLORIDE mmol/L 103 106 108* 104 101   CO2 mmol/L 20.4* 18.7* 17.0* 16.4* 17.0*   BUN mg/dL 21 25* 32* 43* 53*   CREATININE mg/dL 1.52* 1.56* 1.65* 2.15* 2.97*   CALCIUM mg/dL 9.3 9.4 9.4 9.2 9.8   BILIRUBIN mg/dL  --   --  0.3 0.3 0.5   ALK PHOS U/L  --   --  123* 156* 159*   ALT (SGPT) U/L  --   --  12 14 15   AST (SGOT) U/L  --   --  16 20 22   GLUCOSE mg/dL 92 95 92 94 109*       Estimated Creatinine Clearance: 25.5 mL/min (A) (by C-G formula based on SCr of 1.52 mg/dL (H)).    Results from last 7 days   Lab Units 10/04/24  0448 10/03/24  0416 10/02/24  0327 10/01/24  0248   MAGNESIUM mg/dL 1.8  --  2.1 1.5*   PHOSPHORUS mg/dL 3.4 2.9 2.5 2.9       Results from last 7 days   Lab Units 10/03/24  0416 10/02/24  0327 10/01/24  0248   URIC ACID mg/dL 9.1* 9.7* 8.9*       Results from last 7 days   Lab Units 10/04/24  0448 10/03/24  0416 10/02/24  0327 10/01/24  0248 09/30/24  1408   WBC 10*3/mm3 15.93* 13.06* 14.14* 15.88* 17.12*   HEMOGLOBIN g/dL 9.7* 10.0* 9.3* 9.4* 10.6*   PLATELETS 10*3/mm3 369 306 288 270 267             Assessment / Plan     ASSESSMENT:  SHARON on likely CKD, improving:  prerenal from decline in oral intake, use of irbesartan limiting renal autoregulation, and infection in the urinary tract. Hypovolemia resolved with IVF; normal potassium and improving NAGMA on oral alkali. Urine sodium-avid. Difficult to interpret UA given squamous cells.  Despite significant pyuria and bacteriuria, she has no urinary complaints other than low back pain  UTI/fever/left renal cystic mass by CT scan, on IV antibiotics;  plans further imaging  Leukocytosis, worsening: Agree with Dr. Rush that patient should remain an inpatient until this problem is better.  Patient and daughter understand  Dementia  Hypertension, BP acceptable on amlodipine 10 mg daily  SVT, resolved without amiodarone drip, echo (10/2) showed EF 74.6% with RVSP 43mmHg  Anemia, hgb stable      PLAN:  MRI of the abdomen at some point  Reduce sodium bicarbonate dose  Discussed with daughter at bedside    Thank you for involving us in the care of Anita Frankel.  Please feel free to call with any questions.    Gomez Mesa MD  10/04/24  17:36 EDT    [facility]  [phone]    Please note that portions of this note were completed with a voice recognition program.

## 2024-10-04 NOTE — THERAPY TREATMENT NOTE
Patient Name: Anita Frankel  : 1940    MRN: [id number]                              Today's Date: 10/4/2024       Admit Date: 2024    Visit Dx:     ICD-10-CM ICD-9-CM   1. Left flank pain  R10.9 789.09   2. Acute renal failure, unspecified acute renal failure type  N17.9 584.9   3. Leukocytosis, unspecified type  D72.829 288.60   4. Abnormal CT of the abdomen  R93.5 793.6   5. SHARON (acute kidney injury)  N17.9 584.9     Patient Active Problem List   Diagnosis    SHARON (acute kidney injury)     Past Medical History:   Diagnosis Date    Hypertension     Kidney stone      Past Surgical History:   Procedure Laterality Date    ABDOMINAL SURGERY      polyp removal    CHOLECYSTECTOMY      HYSTERECTOMY      TONSILLECTOMY        General Information       Row Name 10/04/24 1121          OT Time and Intention    Document Type therapy note (daily note)  -MM     Mode of Treatment occupational therapy;individual therapy  -MM       Row Name 10/04/24 1121          General Information    Patient Profile Reviewed yes  -MM     Existing Precautions/Restrictions fall  -MM       Row Name 10/04/24 1121          Cognition    Orientation Status (Cognition) oriented x 3  -MM       Row Name 10/04/24 1121          Safety Issues, Functional Mobility    Impairments Affecting Function (Mobility) balance;endurance/activity tolerance;strength  -MM               User Key  (r) = Recorded By, (t) = Taken By, (c) = Cosigned By      Initials Name Provider Type    MM Jackie Moya OT Occupational Therapist                     Mobility/ADL's       Row Name 10/04/24 1121          Bed Mobility    Bed Mobility supine-sit  -MM     Supine-Sit [place] (Bed Mobility) minimum assist (75% patient effort)  -MM     Sit-Supine [place] (Bed Mobility) standby assist  -MM     Assistive Device (Bed Mobility) bed rails;head of bed elevated  -MM     Comment, (Bed Mobility) increased time required, pt reports she still feels weaker than her baseline   -MM       Row Name 10/04/24 1121          Transfers    Transfers sit-stand transfer;toilet transfer;stand-sit transfer  -MM       Row Name 10/04/24 1121          Sit-Stand Transfer    Sit-Stand [place] (Transfers) contact guard  -MM     Comment, (Sit-Stand Transfer) STS from EOB and low height commode  -MM       Row Name 10/04/24 1121          Stand-Sit Transfer    Stand-Sit [place] (Transfers) contact guard;verbal cues  -MM     Assistive Device (Stand-Sit Transfers) walker, front-wheeled  -MM       Row Name 10/04/24 1121          Toilet Transfer    Type (Toilet Transfer) stand-sit;sit-stand  -MM     [place] Level (Toilet Transfer) contact guard  -MM     Assistive Device (Toilet Transfer) commode;grab bars/safety frame;walker, front-wheeled  -MM     Comment, (Toilet Transfer) vc's for use of grab bars  -MM       Row Name 10/04/24 1121          Functional Mobility    Functional Mobility- Ind. Level contact guard assist  -MM     Functional Mobility- Comment to BR commode and sinkside  -MM       Row Name 10/04/24 1121          Activities of Daily Living    BADL Assessment/Intervention lower body dressing;grooming;toileting  -MM       Row Name 10/04/24 1121          Lower Body Dressing Assessment/Training    [place] Level (Lower Body Dressing) don;minimum assist (75% patient effort);pants/bottoms  -MM     Position (Lower Body Dressing) supported standing;unsupported sitting  -MM       Row Name 10/04/24 1121          Toileting Assessment/Training    [place] Level (Toileting) toileting skills;perform perineal hygiene;supervision  -MM     Position (Toileting) unsupported sitting  -MM       Row Name 10/04/24 1121          Grooming Assessment/Training    [place] Level (Grooming) grooming skills;set up;standby assist;oral care regimen;wash face, hands  -MM     Position (Grooming) sink side;supported standing  -MM               User Key  (r) = Recorded By, (t) = Taken By, (c) = Cosigned By       Initials Name Provider Type    MM Jackie Moya OT Occupational Therapist                   Obj/Interventions       Row Name 10/04/24 1123          Motor Skills    Motor Skills functional endurance  -MM     Functional Endurance fair, pt having to take short standing rest breaks with ADLs this date, vitals WFL  -MM       Row Name 10/04/24 1123          Balance    Balance Assessment sitting static balance;sitting dynamic balance;sit to stand dynamic balance;standing static balance;standing dynamic balance  -MM     Static Sitting Balance standby assist  -MM     Dynamic Sitting Balance standby assist  -MM     Position, Sitting Balance sitting edge of bed  -MM     Sit to Stand Dynamic Balance contact guard  -MM     Static Standing Balance contact guard  -MM     Dynamic Standing Balance contact guard  -MM     Position/Device Used, Standing Balance supported;walker, front-wheeled  -MM     Balance Interventions occupation based/functional task;weight shifting activity;minimal challenge  -MM     Comment, Balance no overt LOBs  -MM               User Key  (r) = Recorded By, (t) = Taken By, (c) = Cosigned By      Initials Name Provider Type    MM Jackie Moya OT Occupational Therapist                   Goals/Plan    No documentation.                  Clinical Impression       Row Name 10/04/24 1123          Pain Assessment    Pretreatment Pain Rating 0/10 - no pain  -MM     Posttreatment Pain Rating 0/10 - no pain  -MM       Row Name 10/04/24 1123          Plan of Care Review    Plan of Care Reviewed With patient;family  -MM     Progress improving  -MM     Outcome Evaluation pt seen this AM for OT, she is agreeable, reports fatigue and still reports feeling below her baseline. Her plan is to go stay with her daughter at d/c for a few days. She required increased time for bed mobility and func mob this date, mostly CGA with rwx to BR commode and sinkside. Min A for brief mgment, hygiene with SBA while seated, sinkside g/h  while standing. Some short standing rest breaks required throughout ADLs this date. She will continue to benefit from skilled OT to address deficits and maximize (I) prior to d/c home with daughter.  -MM       Row Name 10/04/24 1123          Therapy Plan Review/Discharge Plan (OT)    Anticipated Discharge Disposition (OT) home;home with assist  -MM       Row Name 10/04/24 1123          Vital Signs    O2 Delivery Pre Treatment room air  -MM       Row Name 10/04/24 1123          Positioning and Restraints    Pre-Treatment Position in bed  -MM     Post Treatment Position bed  -MM     In Bed notified nsg;fowlers;call light within reach;encouraged to call for assist;with family/caregiver  no alarm on entry  -MM               User Key  (r) = Recorded By, (t) = Taken By, (c) = Cosigned By      Initials Name Provider Type    Jackie Ellison OT Occupational Therapist                   Outcome Measures       Row Name 10/04/24 1126          How much help from another is currently needed...    Putting on and taking off regular lower body clothing? 3  -MM     Bathing (including washing, rinsing, and drying) 3  -MM     Toileting (which includes using toilet bed pan or urinal) 3  -MM     Putting on and taking off regular upper body clothing 3  -MM     Taking care of personal grooming (such as brushing teeth) 3  -MM     Eating meals 4  -MM     AM-PAC 6 Clicks Score (OT) 19  -MM       Row Name 10/04/24 1126          Functional Assessment    Outcome Measure Options AM-PAC 6 Clicks Daily Activity (OT)  -MM               User Key  (r) = Recorded By, (t) = Taken By, (c) = Cosigned By      Initials Name Provider Type    Jackie Ellison OT Occupational Therapist                    Occupational Therapy Education       Title: PT OT SLP Therapies (Done)       Topic: Occupational Therapy (Done)       Point: ADL training (Done)       Description:   Instruct learner(s) on proper safety adaptation and remediation techniques during self care  or transfers.   Instruct in proper use of assistive devices.                  Learning Progress Summary             Patient Acceptance, E, VU by MM at 10/1/2024 1300    Comment: role of OT                         Point: Precautions (Done)       Description:   Instruct learner(s) on prescribed precautions during self-care and functional transfers.                  Learning Progress Summary             Patient Acceptance, E, VU by MM at 10/1/2024 1300    Comment: role of OT                         Point: Body mechanics (Done)       Description:   Instruct learner(s) on proper positioning and spine alignment during self-care, functional mobility activities and/or exercises.                  Learning Progress Summary             Patient Acceptance, E, VU by MM at 10/1/2024 1300    Comment: role of OT                                         User Key       Initials Effective Dates Name Provider Type Discipline    DARLYN 05/31/24 -  Jackie Moya OT Occupational Therapist OT                  OT Recommendation and Plan  Planned Therapy Interventions (OT): activity tolerance training, neuromuscular control/coordination retraining, patient/caregiver education/training, transfer/mobility retraining, strengthening exercise, ROM/therapeutic exercise, occupation/activity based interventions, functional balance retraining  Therapy Frequency (OT): 3 times/wk  Plan of Care Review  Plan of Care Reviewed With: patient, family  Progress: improving  Outcome Evaluation: pt seen this AM for OT, she is agreeable, reports fatigue and still reports feeling below her baseline. Her plan is to go stay with her daughter at d/c for a few days. She required increased time for bed mobility and func mob this date, mostly CGA with rwx to BR commode and sinkside. Min A for brief mgment, hygiene with SBA while seated, sinkside g/h while standing. Some short standing rest breaks required throughout ADLs this date. She will continue to benefit from skilled  OT to address deficits and maximize (I) prior to d/c home with daughter.     Time Calculation:   Evaluation Complexity (OT)  Review Occupational Profile/Medical/Therapy History Complexity: brief/low complexity  Assessment, Occupational Performance/Identification of Deficit Complexity: 1-3 performance deficits  Clinical Decision Making Complexity (OT): problem focused assessment/low complexity  Overall Complexity of Evaluation (OT): low complexity     Time Calculation- OT       Row Name 10/04/24 1126             Time Calculation- OT    OT Start Time 1051  -MM      OT Stop Time 1114  -MM      OT Time Calculation (min) 23 min  -MM      Total Timed Code Minutes- OT 23 minute(s)  -MM      OT Received On 10/04/24  -MM      OT - Next Appointment 10/07/24  -MM         Timed Charges    83940 - OT Self Care/Mgmt Minutes 23  -MM         Total Minutes    Timed Charges Total Minutes 23  -MM       Total Minutes 23  -MM                User Key  (r) = Recorded By, (t) = Taken By, (c) = Cosigned By      Initials Name Provider Type    MM Jackie Moya OT Occupational Therapist                  Therapy Charges for Today       Code Description Service Date Service Provider Modifiers Qty    [id number] HC OT SELF CARE/MGMT/TRAIN EA 15 MIN 10/4/2024 Jackie Moya OT GO 2                 Jackie Moya OT  10/4/2024

## 2024-10-04 NOTE — NURSING NOTE
"Pt's daughter insistent pt should no longer should be in the hospital. Explained MRI pending; states was informed during admission that Urology or Nephrology informed they could do outpt. Educated on WBC trending upwards; family states that this could be very well a \"Nosocomial Infection and pt would just need fluids at home\" and this \"is no reason to keep her here\". Offered to inform  and ; declined.     Paged Dr. Rush informed of families request. MD would like opinion of other consulting services. Spoke to Nephrology and they stated from their point of view they are ok to d/c would need f/u and would arrange.     Informed Dr. Rush. MD reports after further evaluation does not feel pt should be d/c today related to WBC trending upwards. Concern that if pt is d/c today possibility to return to [facility] for exacerbation/worsening of illness.   "

## 2024-10-04 NOTE — PLAN OF CARE
Goal Outcome Evaluation:  Plan of Care Reviewed With: patient        Progress: improving  Outcome Evaluation: Pt tolerated treatment well this date. Required SV for bed mobility, then SBA to stand. Pt ambulated 100ft w/ no AD, CGA for safety. Pt also completed a few seated LE exercises, and encouraged pt to ambulate w/ nsg during the day.      Anticipated Discharge Disposition (PT): home with home health

## 2024-10-05 LAB
ALBUMIN SERPL-MCNC: 3.2 G/DL (ref 3.5–5.2)
ANION GAP SERPL CALCULATED.3IONS-SCNC: 12.2 MMOL/L (ref 5–15)
BACTERIA SPEC AEROBE CULT: NORMAL
BACTERIA SPEC AEROBE CULT: NORMAL
BASOPHILS # BLD AUTO: 0.04 10*3/MM3 (ref 0–0.2)
BASOPHILS NFR BLD AUTO: 0.3 % (ref 0–1.5)
BUN SERPL-MCNC: 19 MG/DL (ref 8–23)
BUN/CREAT SERPL: 12.8 (ref 7–25)
CALCIUM SPEC-SCNC: 9.5 MG/DL (ref 8.6–10.5)
CHLORIDE SERPL-SCNC: 100 MMOL/L (ref 98–107)
CO2 SERPL-SCNC: 23.8 MMOL/L (ref 22–29)
CREAT SERPL-MCNC: 1.49 MG/DL (ref 0.57–1)
DEPRECATED RDW RBC AUTO: 38.3 FL (ref 37–54)
EGFRCR SERPLBLD CKD-EPI 2021: 34.1 ML/MIN/1.73
EOSINOPHIL # BLD AUTO: 0.21 10*3/MM3 (ref 0–0.4)
EOSINOPHIL NFR BLD AUTO: 1.4 % (ref 0.3–6.2)
ERYTHROCYTE [DISTWIDTH] IN BLOOD BY AUTOMATED COUNT: 11.8 % (ref 12.3–15.4)
GLUCOSE SERPL-MCNC: 90 MG/DL (ref 65–99)
HCT VFR BLD AUTO: 28.5 % (ref 34–46.6)
HGB BLD-MCNC: 9.4 G/DL (ref 12–15.9)
IMM GRANULOCYTES # BLD AUTO: 0.29 10*3/MM3 (ref 0–0.05)
IMM GRANULOCYTES NFR BLD AUTO: 1.9 % (ref 0–0.5)
LYMPHOCYTES # BLD AUTO: 2.05 10*3/MM3 (ref 0.7–3.1)
LYMPHOCYTES NFR BLD AUTO: 13.7 % (ref 19.6–45.3)
MAGNESIUM SERPL-MCNC: 1.6 MG/DL (ref 1.6–2.4)
MCH RBC QN AUTO: 29.7 PG (ref 26.6–33)
MCHC RBC AUTO-ENTMCNC: 33 G/DL (ref 31.5–35.7)
MCV RBC AUTO: 89.9 FL (ref 79–97)
MONOCYTES # BLD AUTO: 1.13 10*3/MM3 (ref 0.1–0.9)
MONOCYTES NFR BLD AUTO: 7.6 % (ref 5–12)
NEUTROPHILS NFR BLD AUTO: 11.19 10*3/MM3 (ref 1.7–7)
NEUTROPHILS NFR BLD AUTO: 75.1 % (ref 42.7–76)
NRBC BLD AUTO-RTO: 0 /100 WBC (ref 0–0.2)
PHOSPHATE SERPL-MCNC: 3.3 MG/DL (ref 2.5–4.5)
PLATELET # BLD AUTO: 385 10*3/MM3 (ref 140–450)
PMV BLD AUTO: 10.7 FL (ref 6–12)
POTASSIUM SERPL-SCNC: 3.8 MMOL/L (ref 3.5–5.2)
RBC # BLD AUTO: 3.17 10*6/MM3 (ref 3.77–5.28)
SODIUM SERPL-SCNC: 136 MMOL/L (ref 136–145)
WBC NRBC COR # BLD AUTO: 14.91 10*3/MM3 (ref 3.4–10.8)

## 2024-10-05 PROCEDURE — 25010000002 CEFTRIAXONE PER 250 MG: Performed by: HOSPITALIST

## 2024-10-05 PROCEDURE — 85025 COMPLETE CBC W/AUTO DIFF WBC: CPT | Performed by: HOSPITALIST

## 2024-10-05 PROCEDURE — 83735 ASSAY OF MAGNESIUM: CPT | Performed by: INTERNAL MEDICINE

## 2024-10-05 PROCEDURE — 99232 SBSQ HOSP IP/OBS MODERATE 35: CPT | Performed by: INTERNAL MEDICINE

## 2024-10-05 PROCEDURE — 80069 RENAL FUNCTION PANEL: CPT | Performed by: INTERNAL MEDICINE

## 2024-10-05 RX ADMIN — FAMOTIDINE 20 MG: 20 TABLET, FILM COATED ORAL at 09:00

## 2024-10-05 RX ADMIN — MEMANTINE HYDROCHLORIDE 10 MG: 10 TABLET, FILM COATED ORAL at 20:30

## 2024-10-05 RX ADMIN — AMLODIPINE BESYLATE 10 MG: 10 TABLET ORAL at 20:30

## 2024-10-05 RX ADMIN — SODIUM BICARBONATE 650 MG: 650 TABLET ORAL at 20:30

## 2024-10-05 RX ADMIN — CALCIUM POLYCARBOPHIL 625 MG: 625 TABLET, FILM COATED ORAL at 09:01

## 2024-10-05 RX ADMIN — SODIUM BICARBONATE 650 MG: 650 TABLET ORAL at 09:01

## 2024-10-05 RX ADMIN — CEFTRIAXONE SODIUM 1000 MG: 1 INJECTION, POWDER, FOR SOLUTION INTRAMUSCULAR; INTRAVENOUS at 16:11

## 2024-10-05 RX ADMIN — RIVASTIGMINE TARTRATE 6 MG: 1.5 CAPSULE ORAL at 17:05

## 2024-10-05 RX ADMIN — ROSUVASTATIN CALCIUM 10 MG: 10 TABLET, FILM COATED ORAL at 20:30

## 2024-10-05 RX ADMIN — CEFTRIAXONE SODIUM 1000 MG: 1 INJECTION, POWDER, FOR SOLUTION INTRAMUSCULAR; INTRAVENOUS at 12:48

## 2024-10-05 RX ADMIN — FAMOTIDINE 20 MG: 20 TABLET, FILM COATED ORAL at 20:30

## 2024-10-05 RX ADMIN — MEMANTINE HYDROCHLORIDE 10 MG: 10 TABLET, FILM COATED ORAL at 09:00

## 2024-10-05 RX ADMIN — RIVASTIGMINE TARTRATE 6 MG: 1.5 CAPSULE ORAL at 09:00

## 2024-10-05 RX ADMIN — ASPIRIN 81 MG: 81 TABLET, COATED ORAL at 20:30

## 2024-10-05 RX ADMIN — SODIUM BICARBONATE 650 MG: 650 TABLET ORAL at 12:54

## 2024-10-05 NOTE — PROGRESS NOTES
"  [facility]OLOG[facility] DAILY PROGRESS NOTE      Name: Anita Frankel  Age: 84 y.o.  Sex: female  :  1940  MRN: [id number]    Date: 10/5/2024             Subjective:  Interval History:     MRI viewed independently int: Appears to have rind around a fluid collection suspicious for abscess but as radiology said can't r/o neoplasm. mass read as abscess vs cystic neoplasm  Patient clinically stable       Objective:    Vital signs in last 24 hours:  Temp:  [98.4 °F (36.9 °C)-99.1 °F (37.3 °C)] 99.1 °F (37.3 °C)  Heart Rate:  [87-96] 91  Resp:  [16-18] 18  BP: (131-144)/(55-65) 131/55    Intake/Output:    Intake/Output Summary (Last 24 hours) at 10/5/2024 0745  Last data filed at 10/5/2024 0030  Gross per 24 hour   Intake 810 ml   Output --   Net 810 ml       Exam:  /55 (BP Location: Right arm, Patient Position: Lying)   Pulse 91   Temp 99.1 °F (37.3 °C) (Oral)   Resp 18   Ht 160 cm (63\")   Wt 73.5 kg (162 lb 1.6 oz)   SpO2 94%   BMI 28.71 kg/m²     General Appearance:    Alert, cooperative, no acute distress   Back:     Symmetric, no CVA tenderness   Lungs:     Respirations unlabored   Heart:    Regular rate and rhythm   Abdomen:    S,NT, ND   Genitalia:   Not examined   Extremities:   Extremities normal, atraumatic, no cyanosis or edema   Skin:   Skin color, texture, turgor normal, no rashes or lesions        Assessment:    SHARON (acute kidney injury)        Assessment plan:     84-year-old female with a  1.  Left cystic mass; new and undiagnosed  2.  Acute cystitis; chronic stable  3.  Acute pyelonephritis; chronic stable     WBC: 15 stable  Creatinine: 1.5 stable     -MRI - see HPI for int  -recommend non-emergent IR consult for consideration of aspiration and drainage - if purulent appearing, pigtail drain placement, if not consider biopsy  -Urine culture greater than 100,000 E. coli; no sensitivities?  on Rocjasmin Alaniz MD  10/5/2024  07:45 EDT    "

## 2024-10-05 NOTE — NURSING NOTE
Care assumed from Rachelle, charge nurse. Pt left her glasses down in MRI earlier this evening. MRI called and said it was left on the stretchers and transporters notified to look on stretchers. Daughter at BS and updated that staff is looking for her glasses.

## 2024-10-05 NOTE — PLAN OF CARE
Goal Outcome Evaluation:              Outcome Evaluation: VSS, up to BR with asst of 1, nenita well, glasses found from MRI, daughter at BS, pt oriented to name only, reorients easily and cooperative with care, safety maintained

## 2024-10-05 NOTE — PROGRESS NOTES
"[facility]  Cardiology Progress Note    Patient Identification:  Name: Anita Frankel  Age: 84 y.o.  Sex: female  :  1940  MRN: [id number]                 Follow Up / Chief Complaint: follow up for svt    Interval History: resting in bed. No chest pain or shortness of breath.             Objective:    Past Medical History:  Past Medical History:   Diagnosis Date    Hypertension     Kidney stone      Past Surgical History:  Past Surgical History:   Procedure Laterality Date    ABDOMINAL SURGERY      polyp removal    CHOLECYSTECTOMY      HYSTERECTOMY      TONSILLECTOMY          Social History:   Social History     Tobacco Use    Smoking status: Never     Passive exposure: Past    Smokeless tobacco: Never   Substance Use Topics    Alcohol use: Yes     Comment: very rarely      Family History:  History reviewed. No pertinent family history.       Allergies:  No Known Allergies  Scheduled Meds:  amLODIPine, 10 mg, Nightly  aspirin, 81 mg, Nightly  cefTRIAXone, 1,000 mg, Q24H  famotidine, 20 mg, BID  memantine, 10 mg, Q12H  metoprolol succinate XL, 25 mg, Q24H  polycarbophil, 625 mg, Daily  rivastigmine, 6 mg, BID With Meals  rosuvastatin, 10 mg, Nightly  sodium bicarbonate, 650 mg, TID            INTAKE AND OUTPUT:    Intake/Output Summary (Last 24 hours) at 10/5/2024 1530  Last data filed at 10/5/2024 1248  Gross per 24 hour   Intake 970 ml   Output --   Net 970 ml       ROS  Constitutional: Awake and alert, no fever. No nosebleeds  Abdomen           no abdominal pain   Cardiac              no chest pain  Pulmonary          no shortness of breath      /56 (BP Location: Right arm, Patient Position: Sitting)   Pulse 86   Temp 98.2 °F (36.8 °C) (Oral)   Resp 18   Ht 160 cm (63\")   Wt 73.5 kg (162 lb 1.6 oz)   SpO2 96%   BMI 28.71 kg/m²   General appearance: No acute changes   Neck: Trachea midline; NECK, supple, no thyromegaly or lymphadenopathy   Lungs: Normal size and shape, normal " breath sounds, equal distribution of air, no rales or rhonchi   CV: S1-S2 regular, no murmurs, no rub, no gallop   Abdomen: Soft, nontender; no masses , no abnormal abdominal sounds   Extremities: No deformity , normal color , no peripheral edema   Skin: Normal temperature, turgor and texture; no rash, ulcers        I reviewed the patient's new clinical results, and personally reviewed and interpreted the patient's ECG and telemetry data from the last 24 hours          Cardiographics       ECG:      :                   Echocardiogram:        Interpretation Summary         Left ventricular systolic function is hyperdynamic (EF > 70%). Calculated left ventricular EF = 74.6%    The following left ventricular wall segments are hyperkinetic: basal anterior, basal anterolateral, basal inferolateral, basal inferior, basal inferoseptal, basal anteroseptal, mid anterior, mid anterolateral, mid inferolateral, mid inferior, mid inferoseptal, mid anteroseptal, apical anterior, apical lateral, apical inferior, apical septal and apex.    Left ventricular diastolic function was normal.    Moderate tricuspid valve regurgitation is present.    Estimated right ventricular systolic pressure from tricuspid regurgitation is mildly elevated (35-45 mmHg). Calculated right ventricular systolic pressure from tricuspid regurgitation is 43 mmHg.        Stress test pending     Imaging     Narrative & Impression   CT ABDOMEN PELVIS WO CONTRAST-     DATE OF EXAM: 9/30/2024 2:32 PM     INDICATION: Left flank pain, suspect kidney stone.     COMPARISON: Chest radiographs 8/21/2018.     TECHNIQUE: Multiple contiguous axial images were acquired through the  abdomen and pelvis without the intravenous administration of contrast.  Reformatted coronal and sagittal sequences were also reviewed. Radiation  dose reduction techniques were utilized, including automated exposure  control and exposure modulation based on body size.     FINDINGS:  Multifocal  bibasilar subsegmental atelectasis and/or scarring. Partially  imaged calcified coronary artery disease. Aortic valve calcifications.     Incompletely evaluated 1.5 cm low-density lesion in the right lobe of  the liver, statistically representing a hepatic cyst or hemangioma.  Status post cholecystectomy. The spleen is unremarkable in limited  noncontrast CT appearance. Fatty atrophy of the pancreas. Nonspecific  but possibly benign low-density nodular thickening of both adrenal  glands. Complex bilobed heterogeneous predominantly low-attenuation  masslike collection in the upper pole of the left kidney and extending  posteriorly into the retroperitoneal fat, measuring approximately 8.5 cm  x 5 cm in axial dimensions (axial series 2 image 51) and 7 cm  craniocaudal (coronal series 4 image 74). Tiny 2 mm nonobstructing  calcification in the lower pole the right kidney. The right kidney is  otherwise unremarkable in limited noncontrast CT appearance. No  obstructing renal or ureteral stone is identified. No hydronephrosis or  hydroureter. The urinary bladder is nondistended. Status post  hysterectomy. The adnexa not definitively identified.     Mild colorectal stool. Colonic diverticula, without CT evidence of  diverticulitis. Mild diffuse colonic wall thickening could be  accentuated by underdistention but could reflect a nonspecific colitis.  No bowel obstruction. The appendix is normal.     No free fluid in the abdomen or pelvis. No free intraperitoneal air. No  pathologically enlarged lymph nodes are identified, although evaluation  is mildly limited by the lack of intravenous contrast. Moderate to  severe calcified atherosclerotic disease in the abdominal aorta and its  distal branches without aneurysm.     Mild diffuse anasarca. Rectus abdominis diastases. Anterior lower  abdominal wall surgical scar. Diffuse osteopenia. Transitional  lumbosacral vertebral anatomy with partially lumbarized S1 vertebral  segment  and rudimentary S1-S2 disc space. Nonspecific sclerotic foci in  the right T10 transverse process and the right side of the sacrum at the  S4 vertebral level, possible benign bone islands. Mild to moderate  bilateral hip and SI joint DJD and mild to moderate DJD of the pubic  symphysis with chondrocalcinosis. No acute osseous abnormality is  identified.     IMPRESSION:     1. Nonspecific complex bilobed heterogeneous predominately  low-attenuation masslike collection in the upper pole of the left kidney  and extending posteriorly into the retroperitoneal fat. Findings could  represent a primary renal neoplasm or possible pyelonephritis with  intrarenal and perirenal abscess. Correlate with urinalysis. Could  consider further evaluation with focused ultrasound or dedicated pre and  postcontrast CT or MRI if clinically indicated.  2. Mild diffuse anasarca.  3. Incompletely evaluated 1.5 cm low-density lesion in the right lobe of  the liver, statistically representing a hepatic cyst or hemangioma.  Recommend attention on follow-up.  4. Sclerotic foci in the right T10 transverse process and right side of  the sacrum, possible benign bone islands. Could consider further  evaluation with routine outpatient nuclear medicine bone scan if  clinically indicated.  5. Mild diffuse colonic wall thickening could be accentuated by  underdistention but could reflect a nonspecific colitis.     This report was finalized on 9/30/2024 3:41 PM by Dom Dalton MD on  Workstation: [id number]     Lab Review   Results from last 7 days   Lab Units 10/03/24  0416 10/02/24  2035 10/02/24  1824   HSTROP T ng/L 25* 24* 24*     Results from last 7 days   Lab Units 10/05/24  0358   MAGNESIUM mg/dL 1.6     Results from last 7 days   Lab Units 10/05/24  0358   SODIUM mmol/L 136   POTASSIUM mmol/L 3.8   BUN mg/dL 19   CREATININE mg/dL 1.49*   CALCIUM mg/dL 9.5     @[id number]@  Results from last 7 days   Lab Units 10/05/24  0358 10/04/24  0440  "10/03/24  0416   WBC 10*3/mm3 14.91* 15.93* 13.06*   HEMOGLOBIN g/dL 9.4* 9.7* 10.0*   HEMATOCRIT % 28.5* 28.9* 30.6*   PLATELETS 10*3/mm3 385 369 306      10/2/24             10/1/24    Interpretation Summary         Left ventricular systolic function is hyperdynamic (EF > 70%). Calculated left ventricular EF = 74.6%    The following left ventricular wall segments are hyperkinetic: basal anterior, basal anterolateral, basal inferolateral, basal inferior, basal inferoseptal, basal anteroseptal, mid anterior, mid anterolateral, mid inferolateral, mid inferior, mid inferoseptal, mid anteroseptal, apical anterior, apical lateral, apical inferior, apical septal and apex.    Left ventricular diastolic function was normal.    Moderate tricuspid valve regurgitation is present.    Estimated right ventricular systolic pressure from tricuspid regurgitation is mildly elevated (35-45 mmHg). Calculated right ventricular systolic pressure from tricuspid regurgitation is 43 mmHg.       Assessment:    SHARON versus CKD: Nephrology following  Left renal cystic mass  SVT on 10/1  Dementia   hypertension   Anemia  hypomagnesia      Plan:  -Low-grade fever noted.,  Defer to primary  -Monitor reviewed and yesterday did appear to be some artifact.  -Blood pressure slightly elevated prior to medications.  Tolerating metoprolol succinate.  -Echo with EF 74.6%, several walls hypokinetic.  LV diastolic function normal.  Moderate TV regurgitation.  Mildly elevated RVSP.      Patient remains in normal sinus rhythm free of cardiac symptoms can be discharged and follow-up as an outpatient  Braden James MD    )10/5/2024         EMR Dragon/Transcription:   \"Dictated utilizing Dragon dictation\".     "

## 2024-10-05 NOTE — PROGRESS NOTES
[facility] Progress Note  [facility]. [place]        Patient Name: Anita Frankel  : 1940  MRN: [id number]   LOS: 4 days    Patient Care Team:  Rowan Bauer MD as PCP - General (Internal Medicine)  Braden James MD as Consulting Physician (Cardiology)    Chief Complaint:    Chief Complaint   Patient presents with    Abnormal Lab     Elevated WBC per family     Primary Care Physician:  Rowan Bauer MD  Date of admission: 2024    Subjective     Interval History:   Follow-up acute on chronic kidney disease stage.  Events noted from last 24 hours.    I reviewed the chart and other providers notes, labs and procedures done since my last note.  Clinically appears to be doing much better, patient's daughter and granddaughter at the bedside.  She denies having any chest pain or shortness of breath appears to be doing better.  She was able to walk back and forth to the bathroom without any problem.  They are concerned about the renal mass noted on MRI.  Review of Systems:   As noted above.    Objective     Vitals:   Temp:  [98.4 °F (36.9 °C)-99.1 °F (37.3 °C)] 99.1 °F (37.3 °C)  Heart Rate:  [87-96] 91  Resp:  [16-18] 18  BP: (131-144)/(55-65) 131/55    Intake/Output Summary (Last 24 hours) at 10/5/2024 0919  Last data filed at 10/5/2024 0030  Gross per 24 hour   Intake 810 ml   Output --   Net 810 ml       Physical Exam:    General Appearance: alert, no acute distress   Skin: warm and dry  HEENT: oral mucosa normal, nonicteric sclera  Neck: supple, no JVD  Lungs: CTA  Heart: RRR, normal S1 and S2  Abdomen: obese, soft, nontender, non distended and positive bowel sounds.  : no palpable bladder  Extremities: Trace edema, no cyanosis or clubbing  Neuro: normal speech and mental status     Scheduled Meds:     Current Facility-Administered Medications   Medication Dose Route Frequency Provider Last Rate Last Admin    amiodarone 150 mg in 100  mL D5W (loading dose)  150 mg Intravenous Once Braden James MD        amLODIPine (NORVASC) tablet 10 mg  10 mg Oral Nightly Roger Rush MD   10 mg at 10/04/24 2257    aspirin EC tablet 81 mg  81 mg Oral Nightly Roger Rush MD   81 mg at 10/04/24 2258    cefTRIAXone (ROCEPHIN) 1,000 mg in sodium chloride 0.9 % 100 mL MBP  1,000 mg Intravenous Q24H Roger Rush  mL/hr at 10/04/24 1208 1,000 mg at 10/04/24 1208    famotidine (PEPCID) tablet 20 mg  20 mg Oral BID Roger Rush MD   20 mg at 10/05/24 0900    memantine (NAMENDA) tablet 10 mg  10 mg Oral Q12H Roger Rush MD   10 mg at 10/05/24 0900    metoprolol succinate XL (TOPROL-XL) 24 hr tablet 25 mg  25 mg Oral Q24H Jeny Rodriguez APRN   25 mg at 10/04/24 0923    polycarbophil tablet 625 mg  625 mg Oral Daily Roger Rush MD   625 mg at 10/05/24 0901    rivastigmine (EXELON) capsule 6 mg  6 mg Oral BID With Meals Roger Rush MD   6 mg at 10/05/24 0900    rosuvastatin (CRESTOR) tablet 10 mg  10 mg Oral Nightly Jeny Rodriguez APRN   10 mg at 10/04/24 2258    sodium bicarbonate tablet 650 mg  650 mg Oral TID Gomez Mesa MD   650 mg at 10/05/24 0901    sodium chloride 0.9 % flush 10 mL  10 mL Intravenous PRN Baldo Dominguez MD        temazepam (RESTORIL) capsule 30 mg  30 mg Oral Nightly PRN Roger Rush MD   30 mg at 10/03/24 2040       amiodarone, 150 mg, Intravenous, Once  amLODIPine, 10 mg, Oral, Nightly  aspirin, 81 mg, Oral, Nightly  cefTRIAXone, 1,000 mg, Intravenous, Q24H  famotidine, 20 mg, Oral, BID  memantine, 10 mg, Oral, Q12H  metoprolol succinate XL, 25 mg, Oral, Q24H  polycarbophil, 625 mg, Oral, Daily  rivastigmine, 6 mg, Oral, BID With Meals  rosuvastatin, 10 mg, Oral, Nightly  sodium bicarbonate, 650 mg, Oral, TID        IV Meds:        Results Reviewed:   I have personally reviewed the results from the time of this admission to 10/5/2024 09:19 EDT     Results from last 7 days   Lab Units  "10/05/24  0358 10/04/24  0448 10/03/24  0416 10/02/24  0327 10/01/24  0248 09/30/24  1408   SODIUM mmol/L 136 137 137 137 136 135*   POTASSIUM mmol/L 3.8 3.5 4.0 3.9 3.6 4.0   CHLORIDE mmol/L 100 103 106 108* 104 101   CO2 mmol/L 23.8 20.4* 18.7* 17.0* 16.4* 17.0*   BUN mg/dL 19 21 25* 32* 43* 53*   CREATININE mg/dL 1.49* 1.52* 1.56* 1.65* 2.15* 2.97*   CALCIUM mg/dL 9.5 9.3 9.4 9.4 9.2 9.8   BILIRUBIN mg/dL  --   --   --  0.3 0.3 0.5   ALK PHOS U/L  --   --   --  123* 156* 159*   ALT (SGPT) U/L  --   --   --  12 14 15   AST (SGOT) U/L  --   --   --  16 20 22   GLUCOSE mg/dL 90 92 95 92 94 109*       Estimated Creatinine Clearance: 26 mL/min (A) (by C-G formula based on SCr of 1.49 mg/dL (H)).    Results from last 7 days   Lab Units 10/05/24  0358 10/04/24  0448 10/03/24  0416 10/02/24  0327   MAGNESIUM mg/dL 1.6 1.8  --  2.1   PHOSPHORUS mg/dL 3.3 3.4 2.9 2.5       Results from last 7 days   Lab Units 10/03/24  0416 10/02/24  0327 10/01/24  0248   URIC ACID mg/dL 9.1* 9.7* 8.9*       Results from last 7 days   Lab Units 10/05/24  0358 10/04/24  0448 10/03/24  0416 10/02/24  0327 10/01/24  0248   WBC 10*3/mm3 14.91* 15.93* 13.06* 14.14* 15.88*   HEMOGLOBIN g/dL 9.4* 9.7* 10.0* 9.3* 9.4*   PLATELETS 10*3/mm3 385 369 306 288 270             Brief Urine Lab Results  (Last result in the past 365 days)        Color   Clarity   Blood   Leuk Est   Nitrite   Protein   CREAT   Urine HCG        09/30/24 1610             133.6         09/30/24 1610 Dark Yellow   Turbid   Small (1+)   Large (3+)   Positive   30 mg/dL (1+)                   No results found for: \"UTPCR\"    Imaging Results (Last 24 Hours)       Procedure Component Value Units Date/Time    MRI Abdomen With & Without Contrast [[id number]] Collected: 10/05/24 0646     Updated: 10/05/24 0723    Addenda:        ADDENDUM:  10 05 24 07:22 Verify Receipt with Nurse Verified receipt with Nurse   Mable WALL; report going to Dr. Rush on 10 05 07:22 (-04:00)      Signed: " 10/05/24 0646 by Demian Larry MD    Narrative:      CR  Patient: FRANKEL, ANITA  Time Out: 06:46  Exam(s): MRI ABDOMEN W WO Contrast     EXAM:    MR Abdomen Without and With Intravenous Contrast    CLINICAL HISTORY:     Reason for exam: left renal mass.    TECHNIQUE:    Multiplanar magnetic resonance images of the abdomen without and with   intravenous contrast.    COMPARISON:    Renal ultrasound of 9 3 2024 and abdomen and pelvis CT without IV   contrast of 9 30 2024    FINDINGS:    Lung bases:  Unremarkable.  No mass.  No consolidation.    Liver:  Unremarkable.  No mass.    Gallbladder and bile ducts:  Unremarkable post cholecystectomy.  No   calcified stones.  No ductal dilation.    Pancreas:  Unremarkable.  No ductal dilation.  No mass.    Spleen:  Unremarkable.  No splenomegaly.    Adrenals:  Unremarkable.  No mass.    Kidneys and ureters:  Exophytic 6.9 cm mass arising from the posterior   lateral cortex of the left kidney is contiguous with a left pararenal   cyst that contains a fluid fluid level, no solid enhancement, and   restricted diffusion in the precipitated, dependent fluid component.    There is rim enhancement and perirenal soft tissue stranding surrounding   this collection which measures overall 9.5 x 6.9 x 9.4 cm.  No   hydronephrosis.  The right kidney is unremarkable.    Stomach and bowel:  Unremarkable.  No obstruction.    Intraperitoneal space:  Unremarkable.  No significant fluid collection.    Retroperitoneal space:  Otherwise unremarkable.  No other significant   fluid collection.  No significant retroperitoneal adenopathy is apparent.    Soft tissues:  Unremarkable.    Vasculature:  Unremarkable.  No abdominal aortic aneurysm.    Lymph nodes:  Unremarkable.  No enlarged lymph nodes.    IMPRESSION:         The left renal mass represents a complex left renal intraparenchymal   fluid collection with an exophytic component. It is suspicious for an   intraparenchymal renal abscess.   Necrotic cystic renal neoplasm is a less   likely differential consideration.  If appropriate, percutaneous   aspiration could be attempted for both diagnostic and therapeutic   purposes.      Impression:            Communications:     Verify Receipt with Nurse     Electronically signed by Demian Larry MD on 10-05-24 at 0646                Assessment / Plan     ASSESSMENT:  SHARON on likely CKD, improving:  prerenal from decline in oral intake, use of irbesartan limiting renal autoregulation, and infection in the urinary tract. Hypovolemia resolved with IVF; normal potassium and improving NAGMA on oral alkali. Urine sodium-avid. Difficult to interpret UA given squamous cells.  Despite significant pyuria and bacteriuria, she has no urinary complaints other than low back pain  UTI/fever/left renal cystic mass by CT scan and also on MRI, on IV antibiotics;  plans further imaging, discussed with urology want to wait till things are little more stable.  Leukocytosis: It is starting to improve.  Dementia: Baseline likely vascular.  Hypertension, BP acceptable on amlodipine 10 mg daily  SVT, resolved without amiodarone drip, echo (10/2) showed EF 74.6% with RVSP 43mmHg  Anemia, hgb stable        PLAN:  Clinically appears to be doing much better renal functions improving.  Details were discussed with the patient as well as family in the room.    Details were also discussed with the urology.   Continue with rest of the current treatment plan, and monitor with surveillance labs.  Further recommendations will depend on clinical course of the patient during the current hospitalization.   I have reviewed the copied text to this note, it was edited and the changes made as needed.  It is accurate to the point, when the note was signed today.     Thank you for involving us in the care of Anita Frankel.  Please feel free to call with any questions.    Gerardo Alexander MD, FASN  10/05/24  09:19 EDT    [facility]  [phone]      Part of this note may be an electronic transcription/translation of spoken language to printed text using the Dragon Dictation System.

## 2024-10-05 NOTE — PLAN OF CARE
Goal Outcome Evaluation:           Progress: no change  Outcome Evaluation: vss. ra. a&ox1-2. reg diet. meds whole with thins. iv abx per order. ambulates with x1 assist. dc plan tbd at this time. con't plan of care.

## 2024-10-05 NOTE — PROGRESS NOTES
"Daily progress note    Primary care physician  Dr. Bauer    Subjective  Doing same with no new complaint and family at bedside    History of present illness  84-year-old female with history of hypertension and dementia who lives by herself presented to [facility] emergency room with left flank pain for last few days which is getting worse.  Patient also complained of nausea but no vomiting diarrhea.  Patient also denies any fever chills chest pain shortness of breath palpitation.  Patient workup in ER revealed acute kidney injury and left cystic renal mass admitted for management.      REVIEW OF SYSTEMS  Unremarkable except generalized weakness     PHYSICAL EXAM   Blood pressure 131/56, pulse 86, temperature 98.2 °F (36.8 °C), temperature source Oral, resp. rate 18, height 160 cm (63\"), weight 73.5 kg (162 lb 1.6 oz), SpO2 96%.    GENERAL: Alert well-appearing female no obvious distress.   SKIN: Warm, dry  HENT: Normocephalic, atraumatic  EYES: no scleral icterus  CV: regular rhythm, regular rate  RESPIRATORY: normal effort, moving air bilaterally  ABDOMEN: soft, nontender, nondistended bowel sounds positive  MUSCULOSKELETAL: no deformity  NEURO: alert, moves all extremities, follows commands     LAB RESULTS  Lab Results (last 24 hours)       Procedure Component Value Units Date/Time    Renal Function Panel [[id number]]  (Abnormal) Collected: 10/05/24 0358    Specimen: Blood Updated: 10/05/24 0516     Glucose 90 mg/dL      BUN 19 mg/dL      Creatinine 1.49 mg/dL      Sodium 136 mmol/L      Potassium 3.8 mmol/L      Chloride 100 mmol/L      CO2 23.8 mmol/L      Calcium 9.5 mg/dL      Albumin 3.2 g/dL      Phosphorus 3.3 mg/dL      Anion Gap 12.2 mmol/L      BUN/Creatinine Ratio 12.8     eGFR 34.1 mL/min/1.73     Narrative:      GFR Normal >60  Chronic Kidney Disease <60  Kidney Failure <15    The GFR formula is only valid for adults with stable renal function between ages 18 and 70.    Magnesium " [[id number]]  (Normal) Collected: 10/05/24 0358    Specimen: Blood Updated: 10/05/24 0505     Magnesium 1.6 mg/dL     CBC & Differential [[id number]]  (Abnormal) Collected: 10/05/24 0358    Specimen: Blood Updated: 10/05/24 0449    Narrative:      The following orders were created for panel order CBC & Differential.  Procedure                               Abnormality         Status                     ---------                               -----------         ------                     CBC Auto Differential[[id number]]        Abnormal            Final result                 Please view results for these tests on the individual orders.    CBC Auto Differential [[id number]]  (Abnormal) Collected: 10/05/24 0358    Specimen: Blood Updated: 10/05/24 0449     WBC 14.91 10*3/mm3      RBC 3.17 10*6/mm3      Hemoglobin 9.4 g/dL      Hematocrit 28.5 %      MCV 89.9 fL      MCH 29.7 pg      MCHC 33.0 g/dL      RDW 11.8 %      RDW-SD 38.3 fl      MPV 10.7 fL      Platelets 385 10*3/mm3      Neutrophil % 75.1 %      Lymphocyte % 13.7 %      Monocyte % 7.6 %      Eosinophil % 1.4 %      Basophil % 0.3 %      Immature Grans % 1.9 %      Neutrophils, Absolute 11.19 10*3/mm3      Lymphocytes, Absolute 2.05 10*3/mm3      Monocytes, Absolute 1.13 10*3/mm3      Eosinophils, Absolute 0.21 10*3/mm3      Basophils, Absolute 0.04 10*3/mm3      Immature Grans, Absolute 0.29 10*3/mm3      nRBC 0.0 /100 WBC     Blood Culture - Blood, Arm, Left [[id number]]  (Normal) Collected: 09/30/24 1622    Specimen: Blood from Arm, Left Updated: 10/04/24 1645     Blood Culture No growth at 4 days    Blood Culture - Blood, Arm, Right [[id number]]  (Normal) Collected: 09/30/24 1631    Specimen: Blood from Arm, Right Updated: 10/04/24 1645     Blood Culture No growth at 4 days          Imaging Results (Last 24 Hours)       Procedure Component Value Units Date/Time    MRI Abdomen With & Without Contrast [[id number]] Collected: 10/05/24 0646     Updated: 10/05/24  0723    Addenda:        ADDENDUM:  10 05 24 07:22 Verify Receipt with Nurse Verified receipt with Nurse   Mable GALEANO report going to Dr. Rush on 10 05 07:22 (-04:00)      Signed: 10/05/24 0646 by Demian Larry MD    Narrative:      CR  Patient: FRANKEL, ANITA  Time Out: 06:46  Exam(s): MRI ABDOMEN W WO Contrast     EXAM:    MR Abdomen Without and With Intravenous Contrast    CLINICAL HISTORY:     Reason for exam: left renal mass.    TECHNIQUE:    Multiplanar magnetic resonance images of the abdomen without and with   intravenous contrast.    COMPARISON:    Renal ultrasound of 9 3 2024 and abdomen and pelvis CT without IV   contrast of 9 30 2024    FINDINGS:    Lung bases:  Unremarkable.  No mass.  No consolidation.    Liver:  Unremarkable.  No mass.    Gallbladder and bile ducts:  Unremarkable post cholecystectomy.  No   calcified stones.  No ductal dilation.    Pancreas:  Unremarkable.  No ductal dilation.  No mass.    Spleen:  Unremarkable.  No splenomegaly.    Adrenals:  Unremarkable.  No mass.    Kidneys and ureters:  Exophytic 6.9 cm mass arising from the posterior   lateral cortex of the left kidney is contiguous with a left pararenal   cyst that contains a fluid fluid level, no solid enhancement, and   restricted diffusion in the precipitated, dependent fluid component.    There is rim enhancement and perirenal soft tissue stranding surrounding   this collection which measures overall 9.5 x 6.9 x 9.4 cm.  No   hydronephrosis.  The right kidney is unremarkable.    Stomach and bowel:  Unremarkable.  No obstruction.    Intraperitoneal space:  Unremarkable.  No significant fluid collection.    Retroperitoneal space:  Otherwise unremarkable.  No other significant   fluid collection.  No significant retroperitoneal adenopathy is apparent.    Soft tissues:  Unremarkable.    Vasculature:  Unremarkable.  No abdominal aortic aneurysm.    Lymph nodes:  Unremarkable.  No enlarged lymph nodes.    IMPRESSION:          The left renal mass represents a complex left renal intraparenchymal   fluid collection with an exophytic component. It is suspicious for an   intraparenchymal renal abscess.  Necrotic cystic renal neoplasm is a less   likely differential consideration.  If appropriate, percutaneous   aspiration could be attempted for both diagnostic and therapeutic   purposes.      Impression:            Communications:     Verify Receipt with Nurse     Electronically signed by Demian Larry MD on 10-05-24 at 0646            Current Facility-Administered Medications:     amiodarone 150 mg in 100 mL D5W (loading dose), 150 mg, Intravenous, Once **FOLLOWED BY** [] amiodarone 360 mg in 200 mL D5W infusion, 1 mg/min, Intravenous, Continuous **FOLLOWED BY** [] amiodarone 360 mg in 200 mL D5W infusion, 0.5 mg/min, Intravenous, Continuous, Braden James MD    amLODIPine (NORVASC) tablet 10 mg, 10 mg, Oral, Nightly, Roger Rush MD, 10 mg at 10/04/24 2257    aspirin EC tablet 81 mg, 81 mg, Oral, Nightly, Roger Rush MD, 81 mg at 10/04/24 2258    cefTRIAXone (ROCEPHIN) 1,000 mg in sodium chloride 0.9 % 100 mL MBP, 1,000 mg, Intravenous, Q24H, Roger Rush MD    famotidine (PEPCID) tablet 20 mg, 20 mg, Oral, BID, Roger Rush MD, 20 mg at 10/05/24 0900    memantine (NAMENDA) tablet 10 mg, 10 mg, Oral, Q12H, Roger Rush MD, 10 mg at 10/05/24 0900    metoprolol succinate XL (TOPROL-XL) 24 hr tablet 25 mg, 25 mg, Oral, Q24H, Jeny Rodriguez APRN, 25 mg at 10/04/24 0923    polycarbophil tablet 625 mg, 625 mg, Oral, Daily, Roger Rush MD, 625 mg at 10/05/24 0901    rivastigmine (EXELON) capsule 6 mg, 6 mg, Oral, BID With Meals, Roger Rush MD, 6 mg at 10/05/24 0900    rosuvastatin (CRESTOR) tablet 10 mg, 10 mg, Oral, Nightly, Jeny Rodriguez APRN, 10 mg at 10/04/24 225    sodium bicarbonate tablet 650 mg, 650 mg, Oral, TID, Gomez Mesa MD, 650 mg at 10/05/24 1254    [COMPLETED]  Insert Peripheral IV, , , Once **AND** sodium chloride 0.9 % flush 10 mL, 10 mL, Intravenous, PRN, Baldo Dominguez MD    temazepam (RESTORIL) capsule 30 mg, 30 mg, Oral, Nightly PRN, Latonia Rush MD, 30 mg at 10/03/24 2040     ASSESSMENT  Acute kidney injury resolving  Left renal mass suspicious for renal abscess  Supraventricular tachycardia  Acute E. coli UTI  Hypertension  Dementia    PLAN  CPM  Continue IV antibiotics  Control the heart rate  Infectious disease consult  Cardiology nephrology and urology to follow patient  Adjust home medications  Stress ulcer DVT prophylaxis  Supportive care  PT/OT  Discussed with family nursing staff  Follow closely and further recommendation current hospital course    LATONIA RUSH MD    Copied text in this note has been reviewed and is accurate as of 10/05/24

## 2024-10-06 LAB
ANION GAP SERPL CALCULATED.3IONS-SCNC: 10.7 MMOL/L (ref 5–15)
BASOPHILS # BLD AUTO: 0.03 10*3/MM3 (ref 0–0.2)
BASOPHILS NFR BLD AUTO: 0.2 % (ref 0–1.5)
BUN SERPL-MCNC: 21 MG/DL (ref 8–23)
BUN/CREAT SERPL: 12.4 (ref 7–25)
CALCIUM SPEC-SCNC: 9 MG/DL (ref 8.6–10.5)
CHLORIDE SERPL-SCNC: 103 MMOL/L (ref 98–107)
CO2 SERPL-SCNC: 22.3 MMOL/L (ref 22–29)
CREAT SERPL-MCNC: 1.7 MG/DL (ref 0.57–1)
DEPRECATED RDW RBC AUTO: 38.5 FL (ref 37–54)
EGFRCR SERPLBLD CKD-EPI 2021: 29.1 ML/MIN/1.73
EOSINOPHIL # BLD AUTO: 0.15 10*3/MM3 (ref 0–0.4)
EOSINOPHIL NFR BLD AUTO: 1.2 % (ref 0.3–6.2)
ERYTHROCYTE [DISTWIDTH] IN BLOOD BY AUTOMATED COUNT: 11.8 % (ref 12.3–15.4)
GLUCOSE SERPL-MCNC: 90 MG/DL (ref 65–99)
HCT VFR BLD AUTO: 25.3 % (ref 34–46.6)
HGB BLD-MCNC: 8.3 G/DL (ref 12–15.9)
IMM GRANULOCYTES # BLD AUTO: 0.16 10*3/MM3 (ref 0–0.05)
IMM GRANULOCYTES NFR BLD AUTO: 1.3 % (ref 0–0.5)
LYMPHOCYTES # BLD AUTO: 1.29 10*3/MM3 (ref 0.7–3.1)
LYMPHOCYTES NFR BLD AUTO: 10.6 % (ref 19.6–45.3)
MCH RBC QN AUTO: 29.7 PG (ref 26.6–33)
MCHC RBC AUTO-ENTMCNC: 32.8 G/DL (ref 31.5–35.7)
MCV RBC AUTO: 90.7 FL (ref 79–97)
MONOCYTES # BLD AUTO: 1.04 10*3/MM3 (ref 0.1–0.9)
MONOCYTES NFR BLD AUTO: 8.5 % (ref 5–12)
NEUTROPHILS NFR BLD AUTO: 78.2 % (ref 42.7–76)
NEUTROPHILS NFR BLD AUTO: 9.55 10*3/MM3 (ref 1.7–7)
NRBC BLD AUTO-RTO: 0 /100 WBC (ref 0–0.2)
PLATELET # BLD AUTO: 309 10*3/MM3 (ref 140–450)
PMV BLD AUTO: 10.8 FL (ref 6–12)
POTASSIUM SERPL-SCNC: 3.8 MMOL/L (ref 3.5–5.2)
RBC # BLD AUTO: 2.79 10*6/MM3 (ref 3.77–5.28)
SODIUM SERPL-SCNC: 136 MMOL/L (ref 136–145)
WBC NRBC COR # BLD AUTO: 12.22 10*3/MM3 (ref 3.4–10.8)

## 2024-10-06 PROCEDURE — 85025 COMPLETE CBC W/AUTO DIFF WBC: CPT | Performed by: HOSPITALIST

## 2024-10-06 PROCEDURE — 80048 BASIC METABOLIC PNL TOTAL CA: CPT | Performed by: HOSPITALIST

## 2024-10-06 PROCEDURE — 25010000002 CEFTRIAXONE PER 250 MG: Performed by: HOSPITALIST

## 2024-10-06 PROCEDURE — 99232 SBSQ HOSP IP/OBS MODERATE 35: CPT | Performed by: INTERNAL MEDICINE

## 2024-10-06 RX ORDER — POLYETHYLENE GLYCOL 3350 17 G/17G
17 POWDER, FOR SOLUTION ORAL 2 TIMES DAILY PRN
Status: DISCONTINUED | OUTPATIENT
Start: 2024-10-06 | End: 2024-10-09

## 2024-10-06 RX ADMIN — MEMANTINE HYDROCHLORIDE 10 MG: 10 TABLET, FILM COATED ORAL at 08:47

## 2024-10-06 RX ADMIN — RIVASTIGMINE TARTRATE 6 MG: 1.5 CAPSULE ORAL at 17:19

## 2024-10-06 RX ADMIN — FAMOTIDINE 20 MG: 20 TABLET, FILM COATED ORAL at 20:37

## 2024-10-06 RX ADMIN — FAMOTIDINE 20 MG: 20 TABLET, FILM COATED ORAL at 08:47

## 2024-10-06 RX ADMIN — POLYETHYLENE GLYCOL 3350 17 G: 17 POWDER, FOR SOLUTION ORAL at 17:30

## 2024-10-06 RX ADMIN — ASPIRIN 81 MG: 81 TABLET, COATED ORAL at 20:37

## 2024-10-06 RX ADMIN — SODIUM BICARBONATE 650 MG: 650 TABLET ORAL at 20:37

## 2024-10-06 RX ADMIN — MEMANTINE HYDROCHLORIDE 10 MG: 10 TABLET, FILM COATED ORAL at 20:38

## 2024-10-06 RX ADMIN — RIVASTIGMINE TARTRATE 6 MG: 1.5 CAPSULE ORAL at 08:47

## 2024-10-06 RX ADMIN — CALCIUM POLYCARBOPHIL 625 MG: 625 TABLET, FILM COATED ORAL at 08:47

## 2024-10-06 RX ADMIN — SODIUM BICARBONATE 650 MG: 650 TABLET ORAL at 08:47

## 2024-10-06 RX ADMIN — ROSUVASTATIN CALCIUM 10 MG: 10 TABLET, FILM COATED ORAL at 20:38

## 2024-10-06 RX ADMIN — SODIUM BICARBONATE 650 MG: 650 TABLET ORAL at 13:24

## 2024-10-06 RX ADMIN — CEFTRIAXONE SODIUM 1000 MG: 1 INJECTION, POWDER, FOR SOLUTION INTRAMUSCULAR; INTRAVENOUS at 17:19

## 2024-10-06 RX ADMIN — AMLODIPINE BESYLATE 10 MG: 10 TABLET ORAL at 20:38

## 2024-10-06 RX ADMIN — METOPROLOL SUCCINATE 25 MG: 25 TABLET, EXTENDED RELEASE ORAL at 08:47

## 2024-10-06 NOTE — CONSULTS
CONSULT NOTE    Infectious Diseases - Hugh Giang MD  [facility]       Patient Identification:  Name: Anita Frankel  Age: 84 y.o.  Sex: female  :  1940  MRN: [id number]             Date of Consultation: 10/5/2024      Primary Care Physician: Rowan Bauer MD                               Requesting Physician: Dr. Bird  Reason for Consultation: Sepsis    History of presenting illness: Source of information patient herself and family members and review of records.    Patient is a 84-year-old female with underlying history of hypertension, prior history of kidney stones, dementia but was fairly independent with living by herself presented herself to the emergency room on 2024 for left-sided flank pain which was getting worse.  Workup in the emergency room revealed findings consistent with UTI with elevated white blood cell count abnormal urinalysis and CT scan evidence of left kidney masslike collection with surrounding retroperitoneal fat stranding raising possibility of infection/abscess versus neoplasm.  Patient was noted to have acute kidney injury with elevated creatinine of 2.97.  Blood cultures and urine were drawn and patient was empirically started on IV ceftriaxone.  Her urine culture did come back positive for E. coli.  Her blood culture has been negative.  Patient had low-grade temperature over the course of last 5 days in the range of 100.6 which is now much improved in the last 2 days.  Urology service evaluated the patient for abnormal CT scan finding and recommendation was made to proceed with with different imaging studies while keeping an eye on her fever pattern and response to treatment and leukocytosis pattern.  Discussion did take place about possibility of percutaneous drainage if concern for infection is high.  MRI performed earlier today shows left renal mass likely represent complex left renal intraparenchymal fluid collection with exophytic  component highly suspicious for intra parenchymal renal abscess.  Urology service recommend nonemergent IR consult for consideration for aspiration and drainage with possibility of drain placement.  Patient is overall feeling better with improvement in left-sided flank pain.  Her renal function is also much improved and mental status is much more clear according to the family members at the bedside.  Impression:  This presentation in the above context along with workup revealing left-sided renal parenchymal abnormalities as detailed above with leukocytosis renal insufficiency low-grade fever and overall improvement on IV antibiotic therapy supportive care is consistent with:  1-complicated urinary tract infection with pyelonephritis and probable renal abscess.  2-given her age and prior history of kidney stones other concomitant noninfectious processes such as malignancy and bladder stone need to be considered while being treated aggressively for renal abscess due to complicated pyelonephritis.  3-dementia with transient altered mental status overall improved  4-acute kidney injury likely on chronic kidney disease multifactorial clinically improving  5-hypertension  6-anemia  7-other diagnoses per primary team.    Recommendations/Discussions:  At this juncture I agree with the care plan consisting of IV Rocephin based on the sensitivity of the E. coli found in the urine and the response to treatment in terms of improvement in white blood cell count, sense of wellbeing and correction of septic parameters.  Agree with urology plan for percutaneous drainage/biopsy of left intraparenchymal abscess with extension into the retroperitoneal area with material being sent for pathology and culture studies.  Would recommend IV antibiotic therapy with serial imaging studies after intervention for the treatment of complicated pyelonephritis with renal abscess with end of antibiotic therapy will be decided if either one of the  following milestones are achieved based on serial CT scan imaging and close urological follow-up:   -.There is complete resolution of the left sided pyelonephritis and abscess versus   - Interval imaging studies showing stability of the left-sided with associated clinical improvement, resolution of leukocytosis and renal function.  Monitor closely for side effects of antibiotic therapy.  Overall concept of care including complications of treatment and side effects of antibiotic therapy discussed with the patient and family members at the bedside.  Thank you Dr. Bird for letting me be the part of your patient care please see above impression and recommendations.          Past Medical History:  Past Medical History:   Diagnosis Date    Hypertension     Kidney stone      Past Surgical History:  Past Surgical History:   Procedure Laterality Date    ABDOMINAL SURGERY      polyp removal    CHOLECYSTECTOMY      HYSTERECTOMY      TONSILLECTOMY        Home Meds:  Medications Prior to Admission   Medication Sig Dispense Refill Last Dose    aspirin 81 MG EC tablet Take 1 tablet by mouth Every Night.       B Complex-C (SUPER B COMPLEX PO) Take 1 tablet by mouth Daily.       calcium polycarbophil (FIBER LAXATIVE) 625 MG tablet Take 1 tablet by mouth Daily.       cholecalciferol (VITAMIN D3) 1000 units tablet Take 2 tablets by mouth Daily.       Cyanocobalamin (B-12 PO) Take 1 tablet by mouth Every Night.       desloratadine (CLARINEX REDITAB) 5 MG disintegrating tablet Take 1 tablet by mouth Every Morning.       ferrous sulfate 325 (65 FE) MG tablet Take 1 tablet by mouth Every Night.       irbesartan (AVAPRO) 300 MG tablet Take 1 tablet by mouth Every Night.       memantine (NAMENDA) 10 MG tablet Take 1 tablet by mouth 2 (Two) Times a Day.       rivastigmine (EXELON) 6 MG capsule Take 1 capsule by mouth 2 (Two) Times a Day.       amLODIPine (NORVASC) 10 MG tablet Take 1 tablet by mouth Every Night.       atenolol (TENORMIN)  100 MG tablet Take 1 tablet by mouth 2 (Two) Times a Day.       hydrALAZINE (APRESOLINE) 50 MG tablet Take 1 tablet by mouth 2 (Two) Times a Day.       melatonin 1 MG tablet Take 1 tablet by mouth At Night As Needed for Sleep.       temazepam (RESTORIL) 30 MG capsule Take 1 capsule by mouth At Night As Needed for Sleep. (Patient not taking: Reported on 10/4/2024)   Not Taking    traMADol (ULTRAM) 50 MG tablet Take 1 tablet by mouth 2 (Two) Times a Day As Needed for Moderate Pain.        Current Meds:     Current Facility-Administered Medications:     amLODIPine (NORVASC) tablet 10 mg, 10 mg, Oral, Nightly, Roger Rush MD, 10 mg at 10/05/24 2030    aspirin EC tablet 81 mg, 81 mg, Oral, Nightly, Roger Rush MD, 81 mg at 10/05/24 2030    cefTRIAXone (ROCEPHIN) 1,000 mg in sodium chloride 0.9 % 100 mL MBP, 1,000 mg, Intravenous, Q24H, Roger Rush MD, Last Rate: 200 mL/hr at 10/05/24 1611, 1,000 mg at 10/05/24 1611    famotidine (PEPCID) tablet 20 mg, 20 mg, Oral, BID, Roger Rush MD, 20 mg at 10/05/24 2030    memantine (NAMENDA) tablet 10 mg, 10 mg, Oral, Q12H, Roger Rush MD, 10 mg at 10/05/24 2030    metoprolol succinate XL (TOPROL-XL) 24 hr tablet 25 mg, 25 mg, Oral, Q24H, Jeny Rodriguez APRN, 25 mg at 10/04/24 0923    polycarbophil tablet 625 mg, 625 mg, Oral, Daily, Roger Rush MD, 625 mg at 10/05/24 0901    rivastigmine (EXELON) capsule 6 mg, 6 mg, Oral, BID With Meals, Roger Rush MD, 6 mg at 10/05/24 1705    rosuvastatin (CRESTOR) tablet 10 mg, 10 mg, Oral, Nightly, Jeny Rodriguez APRN, 10 mg at 10/05/24 2030    sodium bicarbonate tablet 650 mg, 650 mg, Oral, TID, Gomez Mesa MD, 650 mg at 10/05/24 2030    [COMPLETED] Insert Peripheral IV, , , Once **AND** sodium chloride 0.9 % flush 10 mL, 10 mL, Intravenous, PRN, Alberto, Baldo ZACARIAS MD    temazepam (RESTORIL) capsule 30 mg, 30 mg, Oral, Nightly PRN, Roger Rush MD, 30 mg at 10/03/24 2040  Allergies:  No Known  "Allergies  Social History:   Social History     Tobacco Use    Smoking status: Never     Passive exposure: Past    Smokeless tobacco: Never   Substance Use Topics    Alcohol use: Yes     Comment: very rarely      Family History:  History reviewed. No pertinent family history.       Review of Systems  See history of present illness and past medical history.  Patient denies headache, dizziness, syncope, falls, trauma, change in vision, change in hearing, change in taste, changes in weight, changes in appetite, focal weakness, numbness, or paresthesia.  Patient denies chest pain, palpitations, dyspnea, orthopnea, PND, cough, sinus pressure, rhinorrhea, epistaxis, hemoptysis, nausea, vomiting,hematemesis, diarrhea, constipation or hematchezia.  Denies cold or heat intolerance, polydipsia, polyuria, polyphagia. Denies hematuria, pyuria, dysuria, hesitancy, frequency or urgency. Denies consumption of raw and under cooked meats foods or change in water source.  Denies fever, chills, sweats, night sweats.  Denies missing any routine medications. Remainder of ROS is negative.      Vitals:   /60 (BP Location: Right arm, Patient Position: Lying)   Pulse 94   Temp 99.9 °F (37.7 °C) (Oral)   Resp 18   Ht 160 cm (63\")   Wt 73.5 kg (162 lb 1.6 oz)   SpO2 93%   BMI 28.71 kg/m²   I/O:   Intake/Output Summary (Last 24 hours) at 10/5/2024 2040  Last data filed at 10/5/2024 1248  Gross per 24 hour   Intake 520 ml   Output --   Net 520 ml     Exam:  Patient is examined using the personal protective equipment as per guidelines from infection control for this particular patient as enacted.  Hand washing was performed before and after patient interaction.  General Appearance:    Alert, cooperative, no distress, appears stated age   Head:    Normocephalic, without obvious abnormality, atraumatic   Eyes:    PERRL, conjunctivae/corneas clear, EOM's intact, both eyes   Ears:    Normal external ear canals, both ears   Nose:   Nares " normal, septum midline, mucosa normal, no drainage    or sinus tenderness   Throat:   Lips, tongue, gums normal; oral mucosa pink and moist   Neck: Supple no adenopathy   Back:     Symmetric, no curvature, ROM normal, no CVA tenderness   Lungs:     Clear to auscultation bilaterally, respirations unlabored   Chest Wall:    No tenderness or deformity    Heart:  S1-S2 regular   Abdomen:   Soft nontender mild left CVA tenderness.   Extremities:   Extremities normal, atraumatic, no cyanosis or edema   Pulses:   Pulses palpable in all extremities; symmetric all extremities   Skin:   Skin color normal, Skin is warm and dry,  no rashes or palpable lesions   Neurologic: Alert and oriented x 3 and currently being followed by family members and appeared pleasant and well.       Data Review:    I reviewed the patient's new clinical results.  Results from last 7 days   Lab Units 10/05/24  0358 10/04/24  0448 10/03/24  0416 10/02/24  0327 10/01/24  0248 09/30/24  1408   WBC 10*3/mm3 14.91* 15.93* 13.06* 14.14* 15.88* 17.12*   HEMOGLOBIN g/dL 9.4* 9.7* 10.0* 9.3* 9.4* 10.6*   PLATELETS 10*3/mm3 385 369 306 288 270 267     Results from last 7 days   Lab Units 10/05/24  0358 10/04/24  0448 10/03/24  0416 10/02/24  0327 10/01/24  0248 09/30/24  1408   SODIUM mmol/L 136 137 137 137 136 135*   POTASSIUM mmol/L 3.8 3.5 4.0 3.9 3.6 4.0   CHLORIDE mmol/L 100 103 106 108* 104 101   CO2 mmol/L 23.8 20.4* 18.7* 17.0* 16.4* 17.0*   BUN mg/dL 19 21 25* 32* 43* 53*   CREATININE mg/dL 1.49* 1.52* 1.56* 1.65* 2.15* 2.97*   CALCIUM mg/dL 9.5 9.3 9.4 9.4 9.2 9.8   GLUCOSE mg/dL 90 92 95 92 94 109*     Microbiology Results (last 10 days)       Procedure Component Value - Date/Time    Blood Culture - Blood, Arm, Right [[id number]]  (Normal) Collected: 09/30/24 1631    Lab Status: Final result Specimen: Blood from Arm, Right Updated: 10/05/24 1645     Blood Culture No growth at 5 days    Blood Culture - Blood, Arm, Left [[id number]]  (Normal) Collected:  09/30/24 1622    Lab Status: Final result Specimen: Blood from Arm, Left Updated: 10/05/24 1645     Blood Culture No growth at 5 days    Urine Culture - Urine, Straight Cath [[id number]]  (Abnormal)  (Susceptibility) Collected: 09/30/24 1610    Lab Status: Final result Specimen: Urine from Straight Cath Updated: 10/04/24 0858     Urine Culture >100,000 CFU/mL Escherichia coli    Narrative:      Colonization of the urinary tract without infection is common. Treatment is discouraged unless the patient is symptomatic, pregnant, or undergoing an invasive urologic procedure.    Susceptibility        Escherichia coli      TIO      Amoxicillin + Clavulanate Susceptible      Ampicillin Resistant      Ampicillin + Sulbactam Intermediate      Cefazolin Susceptible      Cefepime Susceptible      Ceftazidime Susceptible      Ceftriaxone Susceptible      Gentamicin Susceptible      Levofloxacin Susceptible      Nitrofurantoin Susceptible      Piperacillin + Tazobactam Susceptible      Trimethoprim + Sulfamethoxazole Susceptible                               MRI Abdomen With & Without Contrast    Addendum Date: 10/5/2024    ADDENDUM: 10 05 24 07:22 Verify Receipt with Nurse Verified receipt with Nurse Mable WALL; report going to Dr. Rush on 10 05 07:22 (-04:00)     Result Date: 10/5/2024  Communications:  Verify Receipt with Nurse Electronically signed by Demian Larry MD on 10-05-24 at 0646    US Renal Bilateral    Result Date: 9/30/2024  Multiple left renal cysts. No solid renal mass was identified by ultrasound, suggest follow-up evaluation with enhanced renal imaging if not contraindicated. No hydronephrosis or echogenic nephrolithiasis.   This report was finalized on 9/30/2024 5:53 PM by Dr. Otf Cardenas M.D on Workstation: [id number]      CT Abdomen Pelvis Without Contrast    Result Date: 9/30/2024   1. Nonspecific complex bilobed heterogeneous predominately low-attenuation masslike collection in the upper pole of  the left kidney and extending posteriorly into the retroperitoneal fat. Findings could represent a primary renal neoplasm or possible pyelonephritis with intrarenal and perirenal abscess. Correlate with urinalysis. Could consider further evaluation with focused ultrasound or dedicated pre and postcontrast CT or MRI if clinically indicated. 2. Mild diffuse anasarca. 3. Incompletely evaluated 1.5 cm low-density lesion in the right lobe of the liver, statistically representing a hepatic cyst or hemangioma. Recommend attention on follow-up. 4. Sclerotic foci in the right T10 transverse process and right side of the sacrum, possible benign bone islands. Could consider further evaluation with routine outpatient nuclear medicine bone scan if clinically indicated. 5. Mild diffuse colonic wall thickening could be accentuated by underdistention but could reflect a nonspecific colitis.  This report was finalized on 9/30/2024 3:41 PM by Dom Dalton MD on Workstation: [id number]           Assessment:  Active Hospital Problems    Diagnosis  POA    **SHARON (acute kidney injury) [N17.9]  Yes      Resolved Hospital Problems   No resolved problems to display.         Plan:  See above  Hugh Lowe MD   10/5/2024  20:40 EDT    Parts of this note may be an electronic transcription/translation of spoken language to printed text using the Dragon dictation system.

## 2024-10-06 NOTE — PROGRESS NOTES
"  [facility] DAILY PROGRESS NOTE      Name: Anita Frankel  Age: 84 y.o.  Sex: female  :  1940  MRN: [id number]    Date: 10/6/2024             Subjective:  Interval History:     Asked to see  Questions answered regarding plan       Objective:    Vital signs in last 24 hours:  Temp:  [98.2 °F (36.8 °C)-99.9 °F (37.7 °C)] 99 °F (37.2 °C)  Heart Rate:  [] 89  Resp:  [18] 18  BP: (129-158)/(55-63) 129/58    Intake/Output:    Intake/Output Summary (Last 24 hours) at 10/6/2024 1606  Last data filed at 10/5/2024 1924  Gross per 24 hour   Intake 210 ml   Output --   Net 210 ml       Exam:  /58 (BP Location: Right arm, Patient Position: Sitting)   Pulse 89   Temp 99 °F (37.2 °C) (Oral)   Resp 18   Ht 160 cm (63\")   Wt 74 kg (163 lb 3.2 oz)   SpO2 96%   BMI 28.91 kg/m²     General Appearance:    Alert, cooperative, no acute distress   Back:     Symmetric, no CVA tenderness   Lungs:     Respirations unlabored   Heart:    Regular rate and rhythm   Abdomen:    S,NT, ND   Genitalia:   Not examined   Extremities:   Extremities normal, atraumatic, no cyanosis or edema   Skin:   Skin color, texture, turgor normal, no rashes or lesions        Assessment:    SHARON (acute kidney injury)        Assessment plan:     84-year-old female with a  1.  Left cystic mass; new and undiagnosed  2.  Acute cystitis; chronic stable  3.  Acute pyelonephritis; chronic stable     WBC: 15 stable  Creatinine: 1.5 stable     -MRI - equivocal, likely cyst vs abscess  -make NPO MN  - if leukocytosis resolving, likely no need to aspiration /perc drain  -recommend IR consult tomorrow am for consideration of aspiration and perc drainage - if purulent appearing, pigtail drain placement, if not consider biopsy  -Urine culture greater than 100,000 E. coli;  on Nancy Alaniz MD  10/6/2024  16:06 EDT    "

## 2024-10-06 NOTE — PROGRESS NOTES
"Daily progress note    Primary care physician  Dr. Bauer    Subjective  Doing same with no new complaint and family at bedside with multiple questions    History of present illness  84-year-old female with history of hypertension and dementia who lives by herself presented to [facility] emergency room with left flank pain for last few days which is getting worse.  Patient also complained of nausea but no vomiting diarrhea.  Patient also denies any fever chills chest pain shortness of breath palpitation.  Patient workup in ER revealed acute kidney injury and left cystic renal mass admitted for management.      REVIEW OF SYSTEMS  Unremarkable except generalized weakness     PHYSICAL EXAM   Blood pressure 129/58, pulse 89, temperature 99 °F (37.2 °C), temperature source Oral, resp. rate 18, height 160 cm (63\"), weight 74 kg (163 lb 3.2 oz), SpO2 96%.    GENERAL: Alert well-appearing female no obvious distress.   SKIN: Warm, dry  HENT: Normocephalic, atraumatic  EYES: no scleral icterus  CV: regular rhythm, regular rate  RESPIRATORY: normal effort, moving air bilaterally  ABDOMEN: soft, nontender, nondistended bowel sounds positive  MUSCULOSKELETAL: no deformity  NEURO: alert, moves all extremities, follows commands     LAB RESULTS  Lab Results (last 24 hours)       Procedure Component Value Units Date/Time    Basic Metabolic Panel [[id number]]  (Abnormal) Collected: 10/06/24 0448    Specimen: Blood Updated: 10/06/24 0611     Glucose 90 mg/dL      BUN 21 mg/dL      Creatinine 1.70 mg/dL      Sodium 136 mmol/L      Potassium 3.8 mmol/L      Chloride 103 mmol/L      CO2 22.3 mmol/L      Calcium 9.0 mg/dL      BUN/Creatinine Ratio 12.4     Anion Gap 10.7 mmol/L      eGFR 29.1 mL/min/1.73     Narrative:      GFR Normal >60  Chronic Kidney Disease <60  Kidney Failure <15    The GFR formula is only valid for adults with stable renal function between ages 18 and 70.    CBC & Differential [[id number]]  (Abnormal) " Collected: 10/06/24 0448    Specimen: Blood Updated: 10/06/24 0539    Narrative:      The following orders were created for panel order CBC & Differential.  Procedure                               Abnormality         Status                     ---------                               -----------         ------                     CBC Auto Differential[[id number]]        Abnormal            Final result                 Please view results for these tests on the individual orders.    CBC Auto Differential [[id number]]  (Abnormal) Collected: 10/06/24 0448    Specimen: Blood Updated: 10/06/24 0539     WBC 12.22 10*3/mm3      RBC 2.79 10*6/mm3      Hemoglobin 8.3 g/dL      Hematocrit 25.3 %      MCV 90.7 fL      MCH 29.7 pg      MCHC 32.8 g/dL      RDW 11.8 %      RDW-SD 38.5 fl      MPV 10.8 fL      Platelets 309 10*3/mm3      Neutrophil % 78.2 %      Lymphocyte % 10.6 %      Monocyte % 8.5 %      Eosinophil % 1.2 %      Basophil % 0.2 %      Immature Grans % 1.3 %      Neutrophils, Absolute 9.55 10*3/mm3      Lymphocytes, Absolute 1.29 10*3/mm3      Monocytes, Absolute 1.04 10*3/mm3      Eosinophils, Absolute 0.15 10*3/mm3      Basophils, Absolute 0.03 10*3/mm3      Immature Grans, Absolute 0.16 10*3/mm3      nRBC 0.0 /100 WBC     Blood Culture - Blood, Arm, Left [[id number]]  (Normal) Collected: 09/30/24 1622    Specimen: Blood from Arm, Left Updated: 10/05/24 1645     Blood Culture No growth at 5 days    Blood Culture - Blood, Arm, Right [[id number]]  (Normal) Collected: 09/30/24 1631    Specimen: Blood from Arm, Right Updated: 10/05/24 1645     Blood Culture No growth at 5 days          Imaging Results (Last 24 Hours)       ** No results found for the last 24 hours. **            Current Facility-Administered Medications:     amLODIPine (NORVASC) tablet 10 mg, 10 mg, Oral, Nightly, Roger Rush MD, 10 mg at 10/05/24 2030    aspirin EC tablet 81 mg, 81 mg, Oral, Nightly, Roger Rush MD, 81 mg at 10/05/24 2030     cefTRIAXone (ROCEPHIN) 1,000 mg in sodium chloride 0.9 % 100 mL MBP, 1,000 mg, Intravenous, Q24H, Latonia Rush MD, Last Rate: 200 mL/hr at 10/05/24 1611, 1,000 mg at 10/05/24 1611    famotidine (PEPCID) tablet 20 mg, 20 mg, Oral, BID, Latonia Rush MD, 20 mg at 10/06/24 0847    memantine (NAMENDA) tablet 10 mg, 10 mg, Oral, Q12H, Latonia Rush MD, 10 mg at 10/06/24 0847    metoprolol succinate XL (TOPROL-XL) 24 hr tablet 25 mg, 25 mg, Oral, Q24H, Jeny Rodriguez APRN, 25 mg at 10/06/24 0847    polycarbophil tablet 625 mg, 625 mg, Oral, Daily, Latonia Rush MD, 625 mg at 10/06/24 0847    rivastigmine (EXELON) capsule 6 mg, 6 mg, Oral, BID With Meals, Latonia Rush MD, 6 mg at 10/06/24 0847    rosuvastatin (CRESTOR) tablet 10 mg, 10 mg, Oral, Nightly, Jeny Rodriguez APRN, 10 mg at 10/05/24 2030    sodium bicarbonate tablet 650 mg, 650 mg, Oral, TID, Gomez Mesa MD, 650 mg at 10/06/24 1324    [COMPLETED] Insert Peripheral IV, , , Once **AND** sodium chloride 0.9 % flush 10 mL, 10 mL, Intravenous, PRN, Baldo Dominguez MD    temazepam (RESTORIL) capsule 30 mg, 30 mg, Oral, Nightly PRN, Latonia Rush MD, 30 mg at 10/03/24 2040     ASSESSMENT  Acute kidney injury resolving  Left renal mass suspicious for renal abscess  Supraventricular tachycardia  Acute E. coli UTI  Hypertension  Dementia    PLAN  CPM  Continue IV antibiotics per infectious disease  IR consult in a.m. for aspiration renal abscess and possible drain placement  Control the heart rate  Infectious disease consult appreciated  Cardiology nephrology and urology to follow patient  Adjust home medications  Stress ulcer DVT prophylaxis  Supportive care  PT/OT  Discussed with family nursing staff  Follow closely and further recommendation current hospital course    LATONIA RSUH MD    Copied text in this note has been reviewed and is accurate as of 10/06/24

## 2024-10-06 NOTE — PLAN OF CARE
Goal Outcome Evaluation:  Plan of Care Reviewed With: patient        Progress: improving   Patient had a calm rest all through the shift. Daughter stayed at bedside all through the shift. Patient was disoriented to time and situation. They were carried along with the plan of care. Patient vital sign stable. No new complain made this moment.

## 2024-10-06 NOTE — PROGRESS NOTES
[facility] Progress Note  [facility]. [place]        Patient Name: Anita Frankel  : 1940  MRN: [id number]   LOS: 5 days    Patient Care Team:  Rowan Bauer MD as PCP - General (Internal Medicine)  Braden James MD as Consulting Physician (Cardiology)    Chief Complaint:    Chief Complaint   Patient presents with    Abnormal Lab     Elevated WBC per family     Primary Care Physician:  Rowan Bauer MD  Date of admission: 2024    Subjective     Interval History:   Follow-up acute on chronic kidney disease stage.  Events noted from last 24 hours.    I reviewed the chart and other providers notes, labs and procedures done since my last note.  Clinically appears to be doing much better, patient's daughter at the bedside.  She denies having any chest pain or shortness of breath appears to be doing better.  She was able to walk back and forth to the bathroom without any problem.  They are concerned about the renal mass noted on MRI.  Review of Systems:   As noted above.    Objective     Vitals:   Temp:  [98.2 °F (36.8 °C)-99.9 °F (37.7 °C)] 98.2 °F (36.8 °C)  Heart Rate:  [] 101  Resp:  [18] 18  BP: (131-158)/(55-63) 142/61  Flow (L/min):  [2] 2    Intake/Output Summary (Last 24 hours) at 10/6/2024 0948  Last data filed at 10/5/2024 1924  Gross per 24 hour   Intake 610 ml   Output --   Net 610 ml       Physical Exam:    General Appearance: alert, no acute distress   Skin: warm and dry  HEENT: oral mucosa normal, nonicteric sclera  Neck: supple, no JVD  Lungs: CTA  Heart: RRR, normal S1 and S2  Abdomen: obese, soft, nontender, non distended and positive bowel sounds.  : no palpable bladder  Extremities: Trace edema, no cyanosis or clubbing  Neuro: normal speech and mental status     Scheduled Meds:     Current Facility-Administered Medications   Medication Dose Route Frequency Provider Last Rate Last Admin    amLODIPine (NORVASC)  tablet 10 mg  10 mg Oral Nightly Roger Rush MD   10 mg at 10/05/24 2030    aspirin EC tablet 81 mg  81 mg Oral Nightly Roger Rush MD   81 mg at 10/05/24 2030    cefTRIAXone (ROCEPHIN) 1,000 mg in sodium chloride 0.9 % 100 mL MBP  1,000 mg Intravenous Q24H Roger Rush  mL/hr at 10/05/24 1611 1,000 mg at 10/05/24 1611    famotidine (PEPCID) tablet 20 mg  20 mg Oral BID Roger Rush MD   20 mg at 10/06/24 0847    memantine (NAMENDA) tablet 10 mg  10 mg Oral Q12H Roger Rush MD   10 mg at 10/06/24 0847    metoprolol succinate XL (TOPROL-XL) 24 hr tablet 25 mg  25 mg Oral Q24H Jeny Rodriguez APRN   25 mg at 10/06/24 0847    polycarbophil tablet 625 mg  625 mg Oral Daily Roger Rush MD   625 mg at 10/06/24 0847    rivastigmine (EXELON) capsule 6 mg  6 mg Oral BID With Meals Roger Rush MD   6 mg at 10/06/24 0847    rosuvastatin (CRESTOR) tablet 10 mg  10 mg Oral Nightly Jeny Rodriguez APRN   10 mg at 10/05/24 2030    sodium bicarbonate tablet 650 mg  650 mg Oral TID Gomez Mesa MD   650 mg at 10/06/24 0847    sodium chloride 0.9 % flush 10 mL  10 mL Intravenous PRN Baldo Dominguez MD        temazepam (RESTORIL) capsule 30 mg  30 mg Oral Nightly PRN Roger Rush MD   30 mg at 10/03/24 2040       amLODIPine, 10 mg, Oral, Nightly  aspirin, 81 mg, Oral, Nightly  cefTRIAXone, 1,000 mg, Intravenous, Q24H  famotidine, 20 mg, Oral, BID  memantine, 10 mg, Oral, Q12H  metoprolol succinate XL, 25 mg, Oral, Q24H  polycarbophil, 625 mg, Oral, Daily  rivastigmine, 6 mg, Oral, BID With Meals  rosuvastatin, 10 mg, Oral, Nightly  sodium bicarbonate, 650 mg, Oral, TID        IV Meds:        Results Reviewed:   I have personally reviewed the results from the time of this admission to 10/6/2024 09:48 EDT     Results from last 7 days   Lab Units 10/06/24  0448 10/05/24  0358 10/04/24  0448 10/03/24  0416 10/02/24  0327 10/01/24  0248 09/30/24  1408   SODIUM mmol/L 136 136 137   < > 137 136 135*  "  POTASSIUM mmol/L 3.8 3.8 3.5   < > 3.9 3.6 4.0   CHLORIDE mmol/L 103 100 103   < > 108* 104 101   CO2 mmol/L 22.3 23.8 20.4*   < > 17.0* 16.4* 17.0*   BUN mg/dL 21 19 21   < > 32* 43* 53*   CREATININE mg/dL 1.70* 1.49* 1.52*   < > 1.65* 2.15* 2.97*   CALCIUM mg/dL 9.0 9.5 9.3   < > 9.4 9.2 9.8   BILIRUBIN mg/dL  --   --   --   --  0.3 0.3 0.5   ALK PHOS U/L  --   --   --   --  123* 156* 159*   ALT (SGPT) U/L  --   --   --   --  12 14 15   AST (SGOT) U/L  --   --   --   --  16 20 22   GLUCOSE mg/dL 90 90 92   < > 92 94 109*    < > = values in this interval not displayed.       Estimated Creatinine Clearance: 22.9 mL/min (A) (by C-G formula based on SCr of 1.7 mg/dL (H)).    Results from last 7 days   Lab Units 10/05/24  0358 10/04/24  0448 10/03/24  0416 10/02/24  0327   MAGNESIUM mg/dL 1.6 1.8  --  2.1   PHOSPHORUS mg/dL 3.3 3.4 2.9 2.5       Results from last 7 days   Lab Units 10/03/24  0416 10/02/24  0327 10/01/24  0248   URIC ACID mg/dL 9.1* 9.7* 8.9*       Results from last 7 days   Lab Units 10/06/24  0448 10/05/24  0358 10/04/24  0448 10/03/24  0416 10/02/24  0327   WBC 10*3/mm3 12.22* 14.91* 15.93* 13.06* 14.14*   HEMOGLOBIN g/dL 8.3* 9.4* 9.7* 10.0* 9.3*   PLATELETS 10*3/mm3 309 385 369 306 288             Brief Urine Lab Results  (Last result in the past 365 days)        Color   Clarity   Blood   Leuk Est   Nitrite   Protein   CREAT   Urine HCG        09/30/24 1610             133.6         09/30/24 1610 Dark Yellow   Turbid   Small (1+)   Large (3+)   Positive   30 mg/dL (1+)                   No results found for: \"UTPCR\"    Imaging Results (Last 24 Hours)       ** No results found for the last 24 hours. **                Assessment / Plan     ASSESSMENT:  SHARON on likely CKD, improving:  prerenal from decline in oral intake, use of irbesartan limiting renal autoregulation, and infection in the urinary tract. Hypovolemia resolved with IVF; normal potassium and improving NAGMA on oral alkali. Urine " sodium-avid. Difficult to interpret UA given squamous cells.  Despite significant pyuria and bacteriuria, she has no urinary complaints other than low back pain  UTI/fever/left renal cystic mass by CT scan and also on MRI, on IV antibiotics;  plans further imaging, discussed with urology want to wait till things are little more stable.  Leukocytosis: It is starting to improve.  Dementia: Baseline likely vascular.  Hypertension, BP acceptable on amlodipine 10 mg daily  SVT, resolved without amiodarone drip, echo (10/2) showed EF 74.6% with RVSP 43mmHg  Anemia, hgb stable        PLAN:  She has been off IV fluids and renal function is slightly worse.  When I asked the daughter she said she does not drink much hardly 1 or 2 glasses a day.  Encouraged to drink more water if renal function continues to worsen may have to restart IV fluids for few days.  Renal mass/abscess plans as per urology.  Continue with the IV antibiotics.  Details were discussed with the patient as well as family in the room.    Details were also discussed with the urology.   Continue with rest of the current treatment plan, and monitor with surveillance labs.  Further recommendations will depend on clinical course of the patient during the current hospitalization.   I have reviewed the copied text to this note, it was edited and the changes made as needed.  It is accurate to the point, when the note was signed today.     Thank you for involving us in the care of Anita Frankel.  Please feel free to call with any questions.    Gerardo Alexander MD, GUILHERME  10/06/24  09:48 EDT    [facility]  [phone]      Part of this note may be an electronic transcription/translation of spoken language to printed text using the Dragon Dictation System.

## 2024-10-06 NOTE — PROGRESS NOTES
"[facility]  Cardiology Progress Note    Patient Identification:  Name: Anita Frankel  Age: 84 y.o.  Sex: female  :  1940  MRN: [id number]                 Follow Up / Chief Complaint: follow up for svt    Interval History: resting in bed. No chest pain or shortness of breath.             Objective:    Past Medical History:  Past Medical History:   Diagnosis Date    Hypertension     Kidney stone      Past Surgical History:  Past Surgical History:   Procedure Laterality Date    ABDOMINAL SURGERY      polyp removal    CHOLECYSTECTOMY      HYSTERECTOMY      TONSILLECTOMY          Social History:   Social History     Tobacco Use    Smoking status: Never     Passive exposure: Past    Smokeless tobacco: Never   Substance Use Topics    Alcohol use: Yes     Comment: very rarely      Family History:  History reviewed. No pertinent family history.       Allergies:  No Known Allergies  Scheduled Meds:  amLODIPine, 10 mg, Nightly  aspirin, 81 mg, Nightly  cefTRIAXone, 1,000 mg, Q24H  famotidine, 20 mg, BID  memantine, 10 mg, Q12H  metoprolol succinate XL, 25 mg, Q24H  polycarbophil, 625 mg, Daily  rivastigmine, 6 mg, BID With Meals  rosuvastatin, 10 mg, Nightly  sodium bicarbonate, 650 mg, TID            INTAKE AND OUTPUT:    Intake/Output Summary (Last 24 hours) at 10/6/2024 1311  Last data filed at 10/5/2024 1924  Gross per 24 hour   Intake 210 ml   Output --   Net 210 ml       ROS  Constitutional: Awake and alert, no fever. No nosebleeds  Abdomen           no abdominal pain   Cardiac              no chest pain  Pulmonary          no shortness of breath      /58 (BP Location: Right arm, Patient Position: Sitting)   Pulse 89   Temp 99 °F (37.2 °C) (Oral)   Resp 18   Ht 160 cm (63\")   Wt 74 kg (163 lb 3.2 oz)   SpO2 96%   BMI 28.91 kg/m²   General appearance: No acute changes   Neck: Trachea midline; NECK, supple, no thyromegaly or lymphadenopathy   Lungs: Normal size and shape, normal breath " sounds, equal distribution of air, no rales or rhonchi   CV: S1-S2 regular, no murmurs, no rub, no gallop   Abdomen: Soft, nontender; no masses , no abnormal abdominal sounds   Extremities: No deformity , normal color , no peripheral edema   Skin: Normal temperature, turgor and texture; no rash, ulcers        I reviewed the patient's new clinical results, and personally reviewed and interpreted the patient's ECG and telemetry data from the last 24 hours          Cardiographics       ECG:      :                   Echocardiogram:        Interpretation Summary         Left ventricular systolic function is hyperdynamic (EF > 70%). Calculated left ventricular EF = 74.6%    The following left ventricular wall segments are hyperkinetic: basal anterior, basal anterolateral, basal inferolateral, basal inferior, basal inferoseptal, basal anteroseptal, mid anterior, mid anterolateral, mid inferolateral, mid inferior, mid inferoseptal, mid anteroseptal, apical anterior, apical lateral, apical inferior, apical septal and apex.    Left ventricular diastolic function was normal.    Moderate tricuspid valve regurgitation is present.    Estimated right ventricular systolic pressure from tricuspid regurgitation is mildly elevated (35-45 mmHg). Calculated right ventricular systolic pressure from tricuspid regurgitation is 43 mmHg.        Stress test pending     Imaging     Narrative & Impression   CT ABDOMEN PELVIS WO CONTRAST-     DATE OF EXAM: 9/30/2024 2:32 PM     INDICATION: Left flank pain, suspect kidney stone.     COMPARISON: Chest radiographs 8/21/2018.     TECHNIQUE: Multiple contiguous axial images were acquired through the  abdomen and pelvis without the intravenous administration of contrast.  Reformatted coronal and sagittal sequences were also reviewed. Radiation  dose reduction techniques were utilized, including automated exposure  control and exposure modulation based on body size.     FINDINGS:  Multifocal bibasilar  subsegmental atelectasis and/or scarring. Partially  imaged calcified coronary artery disease. Aortic valve calcifications.     Incompletely evaluated 1.5 cm low-density lesion in the right lobe of  the liver, statistically representing a hepatic cyst or hemangioma.  Status post cholecystectomy. The spleen is unremarkable in limited  noncontrast CT appearance. Fatty atrophy of the pancreas. Nonspecific  but possibly benign low-density nodular thickening of both adrenal  glands. Complex bilobed heterogeneous predominantly low-attenuation  masslike collection in the upper pole of the left kidney and extending  posteriorly into the retroperitoneal fat, measuring approximately 8.5 cm  x 5 cm in axial dimensions (axial series 2 image 51) and 7 cm  craniocaudal (coronal series 4 image 74). Tiny 2 mm nonobstructing  calcification in the lower pole the right kidney. The right kidney is  otherwise unremarkable in limited noncontrast CT appearance. No  obstructing renal or ureteral stone is identified. No hydronephrosis or  hydroureter. The urinary bladder is nondistended. Status post  hysterectomy. The adnexa not definitively identified.     Mild colorectal stool. Colonic diverticula, without CT evidence of  diverticulitis. Mild diffuse colonic wall thickening could be  accentuated by underdistention but could reflect a nonspecific colitis.  No bowel obstruction. The appendix is normal.     No free fluid in the abdomen or pelvis. No free intraperitoneal air. No  pathologically enlarged lymph nodes are identified, although evaluation  is mildly limited by the lack of intravenous contrast. Moderate to  severe calcified atherosclerotic disease in the abdominal aorta and its  distal branches without aneurysm.     Mild diffuse anasarca. Rectus abdominis diastases. Anterior lower  abdominal wall surgical scar. Diffuse osteopenia. Transitional  lumbosacral vertebral anatomy with partially lumbarized S1 vertebral  segment and  rudimentary S1-S2 disc space. Nonspecific sclerotic foci in  the right T10 transverse process and the right side of the sacrum at the  S4 vertebral level, possible benign bone islands. Mild to moderate  bilateral hip and SI joint DJD and mild to moderate DJD of the pubic  symphysis with chondrocalcinosis. No acute osseous abnormality is  identified.     IMPRESSION:     1. Nonspecific complex bilobed heterogeneous predominately  low-attenuation masslike collection in the upper pole of the left kidney  and extending posteriorly into the retroperitoneal fat. Findings could  represent a primary renal neoplasm or possible pyelonephritis with  intrarenal and perirenal abscess. Correlate with urinalysis. Could  consider further evaluation with focused ultrasound or dedicated pre and  postcontrast CT or MRI if clinically indicated.  2. Mild diffuse anasarca.  3. Incompletely evaluated 1.5 cm low-density lesion in the right lobe of  the liver, statistically representing a hepatic cyst or hemangioma.  Recommend attention on follow-up.  4. Sclerotic foci in the right T10 transverse process and right side of  the sacrum, possible benign bone islands. Could consider further  evaluation with routine outpatient nuclear medicine bone scan if  clinically indicated.  5. Mild diffuse colonic wall thickening could be accentuated by  underdistention but could reflect a nonspecific colitis.     This report was finalized on 9/30/2024 3:41 PM by Dom Dalton MD on  Workstation: [id number]     Lab Review   Results from last 7 days   Lab Units 10/03/24  0416 10/02/24  2035 10/02/24  1824   HSTROP T ng/L 25* 24* 24*     Results from last 7 days   Lab Units 10/05/24  0358   MAGNESIUM mg/dL 1.6     Results from last 7 days   Lab Units 10/06/24  0448   SODIUM mmol/L 136   POTASSIUM mmol/L 3.8   BUN mg/dL 21   CREATININE mg/dL 1.70*   CALCIUM mg/dL 9.0     @[id number]@  Results from last 7 days   Lab Units 10/06/24  0448 10/05/24  0358  "10/04/24  0448   WBC 10*3/mm3 12.22* 14.91* 15.93*   HEMOGLOBIN g/dL 8.3* 9.4* 9.7*   HEMATOCRIT % 25.3* 28.5* 28.9*   PLATELETS 10*3/mm3 309 385 369      10/2/24             10/1/24    Interpretation Summary         Left ventricular systolic function is hyperdynamic (EF > 70%). Calculated left ventricular EF = 74.6%    The following left ventricular wall segments are hyperkinetic: basal anterior, basal anterolateral, basal inferolateral, basal inferior, basal inferoseptal, basal anteroseptal, mid anterior, mid anterolateral, mid inferolateral, mid inferior, mid inferoseptal, mid anteroseptal, apical anterior, apical lateral, apical inferior, apical septal and apex.    Left ventricular diastolic function was normal.    Moderate tricuspid valve regurgitation is present.    Estimated right ventricular systolic pressure from tricuspid regurgitation is mildly elevated (35-45 mmHg). Calculated right ventricular systolic pressure from tricuspid regurgitation is 43 mmHg.       Assessment:    SHARON versus CKD: Nephrology following  Left renal cystic mass  SVT on 10/1  Dementia   hypertension   Anemia  hypomagnesia      Plan:  -Low-grade fever noted.,  Defer to primary  -Monitor reviewed and yesterday did appear to be some artifact.  -Blood pressure slightly elevated prior to medications.  Tolerating metoprolol succinate.  -Echo with EF 74.6%, several walls hypokinetic.  LV diastolic function normal.  Moderate TV regurgitation.  Mildly elevated RVSP.      Patient remains in normal sinus rhythm free of cardiac symptoms can be discharged and follow-up as an outpatient  Braden James MD    )10/6/2024         EMR Dragon/Transcription:   \"Dictated utilizing Dragon dictation\".     "

## 2024-10-06 NOTE — PROGRESS NOTES
Did not see in person today    Recommend NPO at midnight in case of vitals derangement or worsening labs tomorrow AM  Will have AM team review labs  Recommend IR consult in the AM for aspiration and perc drain placement vs biopsy  Given ongoing elevated temps and WBC probably worth attempt at drainage

## 2024-10-07 LAB
ANION GAP SERPL CALCULATED.3IONS-SCNC: 11.5 MMOL/L (ref 5–15)
BASOPHILS # BLD AUTO: 0.03 10*3/MM3 (ref 0–0.2)
BASOPHILS NFR BLD AUTO: 0.3 % (ref 0–1.5)
BUN SERPL-MCNC: 22 MG/DL (ref 8–23)
BUN/CREAT SERPL: 14 (ref 7–25)
CALCIUM SPEC-SCNC: 9.2 MG/DL (ref 8.6–10.5)
CHLORIDE SERPL-SCNC: 103 MMOL/L (ref 98–107)
CO2 SERPL-SCNC: 23.5 MMOL/L (ref 22–29)
CREAT SERPL-MCNC: 1.57 MG/DL (ref 0.57–1)
DEPRECATED RDW RBC AUTO: 40.9 FL (ref 37–54)
EGFRCR SERPLBLD CKD-EPI 2021: 32 ML/MIN/1.73
EOSINOPHIL # BLD AUTO: 0.19 10*3/MM3 (ref 0–0.4)
EOSINOPHIL NFR BLD AUTO: 2 % (ref 0.3–6.2)
ERYTHROCYTE [DISTWIDTH] IN BLOOD BY AUTOMATED COUNT: 12.2 % (ref 12.3–15.4)
GLUCOSE SERPL-MCNC: 92 MG/DL (ref 65–99)
HCT VFR BLD AUTO: 24.9 % (ref 34–46.6)
HGB BLD-MCNC: 8.1 G/DL (ref 12–15.9)
IMM GRANULOCYTES # BLD AUTO: 0.14 10*3/MM3 (ref 0–0.05)
IMM GRANULOCYTES NFR BLD AUTO: 1.5 % (ref 0–0.5)
LYMPHOCYTES # BLD AUTO: 1.29 10*3/MM3 (ref 0.7–3.1)
LYMPHOCYTES NFR BLD AUTO: 13.8 % (ref 19.6–45.3)
MCH RBC QN AUTO: 30.1 PG (ref 26.6–33)
MCHC RBC AUTO-ENTMCNC: 32.5 G/DL (ref 31.5–35.7)
MCV RBC AUTO: 92.6 FL (ref 79–97)
MONOCYTES # BLD AUTO: 0.94 10*3/MM3 (ref 0.1–0.9)
MONOCYTES NFR BLD AUTO: 10 % (ref 5–12)
NEUTROPHILS NFR BLD AUTO: 6.79 10*3/MM3 (ref 1.7–7)
NEUTROPHILS NFR BLD AUTO: 72.4 % (ref 42.7–76)
NRBC BLD AUTO-RTO: 0 /100 WBC (ref 0–0.2)
PLATELET # BLD AUTO: 309 10*3/MM3 (ref 140–450)
PMV BLD AUTO: 10.4 FL (ref 6–12)
POTASSIUM SERPL-SCNC: 4 MMOL/L (ref 3.5–5.2)
RBC # BLD AUTO: 2.69 10*6/MM3 (ref 3.77–5.28)
SODIUM SERPL-SCNC: 138 MMOL/L (ref 136–145)
WBC NRBC COR # BLD AUTO: 9.38 10*3/MM3 (ref 3.4–10.8)

## 2024-10-07 PROCEDURE — 99232 SBSQ HOSP IP/OBS MODERATE 35: CPT | Performed by: NURSE PRACTITIONER

## 2024-10-07 PROCEDURE — 85025 COMPLETE CBC W/AUTO DIFF WBC: CPT | Performed by: HOSPITALIST

## 2024-10-07 PROCEDURE — 80048 BASIC METABOLIC PNL TOTAL CA: CPT | Performed by: HOSPITALIST

## 2024-10-07 PROCEDURE — 25010000002 CEFTRIAXONE PER 250 MG: Performed by: HOSPITALIST

## 2024-10-07 PROCEDURE — 97530 THERAPEUTIC ACTIVITIES: CPT

## 2024-10-07 RX ADMIN — FAMOTIDINE 20 MG: 20 TABLET, FILM COATED ORAL at 08:28

## 2024-10-07 RX ADMIN — MEMANTINE HYDROCHLORIDE 10 MG: 10 TABLET, FILM COATED ORAL at 20:23

## 2024-10-07 RX ADMIN — SODIUM BICARBONATE 650 MG: 650 TABLET ORAL at 20:23

## 2024-10-07 RX ADMIN — METOPROLOL SUCCINATE 25 MG: 25 TABLET, EXTENDED RELEASE ORAL at 08:28

## 2024-10-07 RX ADMIN — CALCIUM POLYCARBOPHIL 625 MG: 625 TABLET, FILM COATED ORAL at 08:27

## 2024-10-07 RX ADMIN — FAMOTIDINE 20 MG: 20 TABLET, FILM COATED ORAL at 20:23

## 2024-10-07 RX ADMIN — ROSUVASTATIN CALCIUM 10 MG: 10 TABLET, FILM COATED ORAL at 20:23

## 2024-10-07 RX ADMIN — CEFTRIAXONE SODIUM 1000 MG: 1 INJECTION, POWDER, FOR SOLUTION INTRAMUSCULAR; INTRAVENOUS at 17:19

## 2024-10-07 RX ADMIN — RIVASTIGMINE TARTRATE 6 MG: 1.5 CAPSULE ORAL at 08:27

## 2024-10-07 RX ADMIN — ASPIRIN 81 MG: 81 TABLET, COATED ORAL at 20:23

## 2024-10-07 RX ADMIN — SODIUM BICARBONATE 650 MG: 650 TABLET ORAL at 08:28

## 2024-10-07 RX ADMIN — RIVASTIGMINE TARTRATE 6 MG: 1.5 CAPSULE ORAL at 17:27

## 2024-10-07 RX ADMIN — MEMANTINE HYDROCHLORIDE 10 MG: 10 TABLET, FILM COATED ORAL at 08:28

## 2024-10-07 RX ADMIN — SODIUM BICARBONATE 650 MG: 650 TABLET ORAL at 14:11

## 2024-10-07 RX ADMIN — AMLODIPINE BESYLATE 10 MG: 10 TABLET ORAL at 20:23

## 2024-10-07 NOTE — PROGRESS NOTES
"  [facility]OLOG[facility] DAILY PROGRESS NOTE      Name: Anita Frankel  Age: 84 y.o.  Sex: female  :  1940  MRN: [id number]    Date: 10/7/2024             Subjective:  Interval History:   Doing well  No fever  No pain  WBC improved to normal  Voiding on her own.  Daughter at bedside       Objective:    Vital signs in last 24 hours:  Temp:  [99 °F (37.2 °C)-99.7 °F (37.6 °C)] 99.7 °F (37.6 °C)  Heart Rate:  [87-91] 91  Resp:  [18] 18  BP: (129-135)/(58-60) 129/60    Intake/Output:    Intake/Output Summary (Last 24 hours) at 10/7/2024 07  Last data filed at 10/6/2024 2125  Gross per 24 hour   Intake 450 ml   Output --   Net 450 ml       Exam:  /60 (BP Location: Right arm, Patient Position: Lying)   Pulse 91   Temp 99.7 °F (37.6 °C) (Oral)   Resp 18   Ht 160 cm (63\")   Wt 73.8 kg (162 lb 9.6 oz)   SpO2 93%   BMI 28.80 kg/m²     General Appearance:    Alert, cooperative, no acute distress   Back:     Symmetric, no CVA tenderness   Lungs:     Respirations unlabored   Heart:    Regular rate and rhythm   Abdomen:    S,NT, ND   Genitalia:   Not examined   Extremities:   Extremities normal, atraumatic, no cyanosis or edema   Skin:   Skin color, texture, turgor normal, no rashes or lesions        Assessment:    SHARON (acute kidney injury)        Assessment plan:     84-year-old female with a  1.  Left cystic mass; new and undiagnosed  2.  Acute cystitis; chronic stable  3.  Acute pyelonephritis; chronic stable     WBC: 9.38, improved     Creatinine: 1.5 stable     -MRI - equivocal, likely cyst vs abscess  - Okay to eat, today   - Since leukocytosis resolving, likely no need to aspiration /perc drain  - If clinically worsening, consider IR consult tomorrow am for consideration of aspiration and perc drainage - if purulent appearing, pigtail drain placement, if not consider biopsy  - Urine culture greater than 100,000 E. coli;  on Rocephin  - Ultimately, we'll get her scheduled for a outpatient visit with Dr." Jerald Alaniz with repeat imaging to view the left kidney  - Urology following     Jerald Vazquez MD  10/7/2024  07:13 EDT

## 2024-10-07 NOTE — PROGRESS NOTES
"Daily progress note    Primary care physician  Dr. Bauer    Subjective  Doing same with no new complaint     History of present illness  84-year-old female with history of hypertension and dementia who lives by herself presented to [facility] emergency room with left flank pain for last few days which is getting worse.  Patient also complained of nausea but no vomiting diarrhea.  Patient also denies any fever chills chest pain shortness of breath palpitation.  Patient workup in ER revealed acute kidney injury and left cystic renal mass admitted for management.      REVIEW OF SYSTEMS  Unremarkable except generalized weakness     PHYSICAL EXAM   Blood pressure 128/60, pulse 84, temperature 98.4 °F (36.9 °C), temperature source Oral, resp. rate 17, height 160 cm (63\"), weight 73.8 kg (162 lb 9.6 oz), SpO2 94%.    GENERAL: Alert well-appearing female no obvious distress.   SKIN: Warm, dry  HENT: Normocephalic, atraumatic  EYES: no scleral icterus  CV: regular rhythm, regular rate  RESPIRATORY: normal effort, moving air bilaterally  ABDOMEN: soft, nontender, nondistended bowel sounds positive  MUSCULOSKELETAL: no deformity  NEURO: alert, moves all extremities, follows commands     LAB RESULTS  Lab Results (last 24 hours)       Procedure Component Value Units Date/Time    Basic Metabolic Panel [[id number]]  (Abnormal) Collected: 10/07/24 0612    Specimen: Blood Updated: 10/07/24 0656     Glucose 92 mg/dL      BUN 22 mg/dL      Creatinine 1.57 mg/dL      Sodium 138 mmol/L      Potassium 4.0 mmol/L      Chloride 103 mmol/L      CO2 23.5 mmol/L      Calcium 9.2 mg/dL      BUN/Creatinine Ratio 14.0     Anion Gap 11.5 mmol/L      eGFR 32.0 mL/min/1.73     Narrative:      GFR Normal >60  Chronic Kidney Disease <60  Kidney Failure <15    The GFR formula is only valid for adults with stable renal function between ages 18 and 70.    CBC & Differential [[id number]]  (Abnormal) Collected: 10/07/24 0612    Specimen: " Blood Updated: 10/07/24 0636    Narrative:      The following orders were created for panel order CBC & Differential.  Procedure                               Abnormality         Status                     ---------                               -----------         ------                     CBC Auto Differential[[id number]]        Abnormal            Final result                 Please view results for these tests on the individual orders.    CBC Auto Differential [[id number]]  (Abnormal) Collected: 10/07/24 0612    Specimen: Blood Updated: 10/07/24 0636     WBC 9.38 10*3/mm3      RBC 2.69 10*6/mm3      Hemoglobin 8.1 g/dL      Hematocrit 24.9 %      MCV 92.6 fL      MCH 30.1 pg      MCHC 32.5 g/dL      RDW 12.2 %      RDW-SD 40.9 fl      MPV 10.4 fL      Platelets 309 10*3/mm3      Neutrophil % 72.4 %      Lymphocyte % 13.8 %      Monocyte % 10.0 %      Eosinophil % 2.0 %      Basophil % 0.3 %      Immature Grans % 1.5 %      Neutrophils, Absolute 6.79 10*3/mm3      Lymphocytes, Absolute 1.29 10*3/mm3      Monocytes, Absolute 0.94 10*3/mm3      Eosinophils, Absolute 0.19 10*3/mm3      Basophils, Absolute 0.03 10*3/mm3      Immature Grans, Absolute 0.14 10*3/mm3      nRBC 0.0 /100 WBC           Imaging Results (Last 24 Hours)       ** No results found for the last 24 hours. **            Current Facility-Administered Medications:     amLODIPine (NORVASC) tablet 10 mg, 10 mg, Oral, Nightly, Roger Rush MD, 10 mg at 10/06/24 2038    aspirin EC tablet 81 mg, 81 mg, Oral, Nightly, Roger Rush MD, 81 mg at 10/06/24 2037    cefTRIAXone (ROCEPHIN) 1,000 mg in sodium chloride 0.9 % 100 mL MBP, 1,000 mg, Intravenous, Q24H, Roger Rush MD, Last Rate: 200 mL/hr at 10/06/24 1719, 1,000 mg at 10/06/24 1719    famotidine (PEPCID) tablet 20 mg, 20 mg, Oral, BID, Roger Rush MD, 20 mg at 10/07/24 0828    memantine (NAMENDA) tablet 10 mg, 10 mg, Oral, Q12H, Roger Rush MD, 10 mg at 10/07/24 0828    metoprolol succinate XL  (TOPROL-XL) 24 hr tablet 25 mg, 25 mg, Oral, Q24H, Jeny Rodriguez APRN, 25 mg at 10/07/24 0828    polycarbophil tablet 625 mg, 625 mg, Oral, Daily, Latonia Rush MD, 625 mg at 10/07/24 0827    polyethylene glycol (MIRALAX) packet 17 g, 17 g, Oral, BID PRN, Latonia Rush MD, 17 g at 10/06/24 1730    rivastigmine (EXELON) capsule 6 mg, 6 mg, Oral, BID With Meals, Latonia Rush MD, 6 mg at 10/07/24 0827    rosuvastatin (CRESTOR) tablet 10 mg, 10 mg, Oral, Nightly, Jeny Rodriguez APRN, 10 mg at 10/06/24 2038    sodium bicarbonate tablet 650 mg, 650 mg, Oral, TID, Gomez Mesa MD, 650 mg at 10/07/24 1411    [COMPLETED] Insert Peripheral IV, , , Once **AND** sodium chloride 0.9 % flush 10 mL, 10 mL, Intravenous, PRN, AlbertoBaldo ortiz MD    temazepam (RESTORIL) capsule 30 mg, 30 mg, Oral, Nightly PRN, Latonia Rush MD, 30 mg at 10/03/24 2040     ASSESSMENT  Acute kidney injury resolving  Left renal mass suspicious for renal abscess  Supraventricular tachycardia  Acute E. coli UTI  Hypertension  Dementia    PLAN  CPM  Continue IV antibiotics per infectious disease  No need for aspiration or percutaneous drain per urology  Control the heart rate  Cardiology nephrology and urology to follow patient  Adjust home medications  Stress ulcer DVT prophylaxis  Supportive care  PT/OT  Discussed with family nursing staff  Discharge planning    LATONIA RUSH MD    Copied text in this note has been reviewed and is accurate as of 10/07/24

## 2024-10-07 NOTE — PLAN OF CARE
Goal Outcome Evaluation:  Plan of Care Reviewed With: patient        Progress: improving   Paulo has a calm rest. Daughter at bedside all through the shift. She was carried along with her plan of care. Patient ambulated to the bathroom , vital sign stable currently on 2L of oxgyen via nasal cannula. No new complain made this moment.

## 2024-10-07 NOTE — THERAPY TREATMENT NOTE
Patient Name: Anita Frankel  : 1940    MRN: [id number]                              Today's Date: 10/7/2024       Admit Date: 2024    Visit Dx:     ICD-10-CM ICD-9-CM   1. Left flank pain  R10.9 789.09   2. Acute renal failure, unspecified acute renal failure type  N17.9 584.9   3. Leukocytosis, unspecified type  D72.829 288.60   4. Abnormal CT of the abdomen  R93.5 793.6   5. SHARON (acute kidney injury)  N17.9 584.9     Patient Active Problem List   Diagnosis    SHARON (acute kidney injury)     Past Medical History:   Diagnosis Date    Hypertension     Kidney stone      Past Surgical History:   Procedure Laterality Date    ABDOMINAL SURGERY      polyp removal    CHOLECYSTECTOMY      HYSTERECTOMY      TONSILLECTOMY        General Information       Row Name 10/07/24 1006          Physical Therapy Time and Intention    Document Type therapy note (daily note)  -EF     Mode of Treatment individual therapy;physical therapy  -EF       Row Name 10/07/24 1006          General Information    Existing Precautions/Restrictions fall  -EF     Barriers to Rehab none identified  -EF       Row Name 10/07/24 1006          Cognition    Orientation Status (Cognition) oriented x 3  -EF       Row Name 10/07/24 1006          Safety Issues, Functional Mobility    Impairments Affecting Function (Mobility) balance;endurance/activity tolerance;strength  -EF               User Key  (r) = Recorded By, (t) = Taken By, (c) = Cosigned By      Initials Name Provider Type    EF Angelina Mills PT Physical Therapist                   Mobility       Row Name 10/07/24 1006          Bed Mobility    Supine-Sit [place] (Bed Mobility) supervision  -EF     Sit-Supine [place] (Bed Mobility) supervision  -EF     Assistive Device (Bed Mobility) head of bed elevated  -EF       Row Name 10/07/24 1006          Sit-Stand Transfer    Sit-Stand [place] (Transfers) standby assist  -EF       Row Name 10/07/24 1006          Gait/Stairs  (Locomotion)    [place] Level (Gait) contact guard  -EF     Distance in Feet (Gait) 100  -EF     Deviations/Abnormal Patterns (Gait) otto decreased;stride length decreased;weight shifting decreased  -EF     Bilateral Gait Deviations heel strike decreased  -EF     Comment, (Gait/Stairs) Slight increase in lateral sway/unsteadiness during turns but no overt LOB. Pt able to safely maintain balance with CGA.  -EF               User Key  (r) = Recorded By, (t) = Taken By, (c) = Cosigned By      Initials Name Provider Type    EF Angelina Mlils, PT Physical Therapist                   Obj/Interventions    No documentation.                  Goals/Plan    No documentation.                  Clinical Impression       Row Name 10/07/24 1007          Pain    Pretreatment Pain Rating 0/10 - no pain  -EF     Posttreatment Pain Rating 0/10 - no pain  -EF       Row Name 10/07/24 1007          Plan of Care Review    Plan of Care Reviewed With patient  -EF     Progress no change  -EF     Outcome Evaluation Pt agreeable to PT and able to maintain activity level during therapy session today. Pt performed bed mobility with supervision and transfers with SBA. Pt ambulated 100' with CGA. Pt slightly unsteady during turns and continues to require assist x1; however, demos no overt LOB. Pt will continue to benefit from PT to address endurance and balance. No new PT concerns, recommend DC home.  -EF       Row Name 10/07/24 1007          Positioning and Restraints    Pre-Treatment Position in bed  -EF     Post Treatment Position bed  -EF     In Bed fowlers;call light within reach;encouraged to call for assist;exit alarm on  -EF               User Key  (r) = Recorded By, (t) = Taken By, (c) = Cosigned By      Initials Name Provider Type    Angelina Rose, PT Physical Therapist                   Outcome Measures       Row Name 10/07/24 1009 10/07/24 0000       How much help from another person do you currently need...     Turning from your back to your side while in flat bed without using bedrails? 4  -EF 3  -OE    Moving from lying on back to sitting on the side of a flat bed without bedrails? 3  -EF 3  -OE    Moving to and from a bed to a chair (including a wheelchair)? 3  -EF 3  -OE    Standing up from a chair using your arms (e.g., wheelchair, bedside chair)? 3  -EF 3  -OE    Climbing 3-5 steps with a railing? 3  -EF 2  -OE    To walk in hospital room? 3  -EF 3  -OE    AM-PAC 6 Clicks Score (PT) 19  -EF 17  -OE    Highest Level of Mobility Goal 6 --> Walk 10 steps or more  -EF 5 --> Static standing  -OE              User Key  (r) = Recorded By, (t) = Taken By, (c) = Cosigned By      Initials Name Provider Type     Angelina Mills, PT Physical Therapist    OE Ilene Steward, RN Registered Nurse                                 Physical Therapy Education       Title: PT OT SLP Therapies (Done)       Topic: Physical Therapy (Done)       Point: Mobility training (Done)       Learning Progress Summary             Patient Acceptance, E,TB, VU,NR by  at 10/7/2024 1009    Acceptance, E, VU by  at 10/4/2024 1957    Acceptance, E,TB,D, VU,NR by  at 10/4/2024 1711    Acceptance, E, VU,DU by  at 10/1/2024 1008   Family Acceptance, E, VU by  at 10/4/2024 1957                                         User Key       Initials Effective Dates Name Provider Type Discipline     05/31/24 -  Angelina Mills, PT Physical Therapist PT     11/18/21 -  Camryn Patino, RN Registered Nurse Nurse     03/07/18 -  Sayda Shearer PTA Physical Therapist Assistant PT     08/22/24 -  Kiersten Smith, NATHALY Student PT Student PT                  PT Recommendation and Plan     Plan of Care Reviewed With: patient  Progress: no change  Outcome Evaluation: Pt agreeable to PT and able to maintain activity level during therapy session today. Pt performed bed mobility with supervision and transfers with SBA. Pt ambulated 100' with CGA. Pt  slightly unsteady during turns and continues to require assist x1; however, demos no overt LOB. Pt will continue to benefit from PT to address endurance and balance. No new PT concerns, recommend DC home.     Time Calculation:         PT Charges       Row Name 10/07/24 1009             Time Calculation    Start Time 0949  -EF      Stop Time 1000  -EF      Time Calculation (min) 11 min  -EF      PT Received On 10/07/24  -EF      PT - Next Appointment 10/08/24  -EF         Time Calculation- PT    Total Timed Code Minutes- PT 11 minute(s)  -EF         Timed Charges    52785 - PT Therapeutic Activity Minutes 11  -EF         Total Minutes    Timed Charges Total Minutes 11  -EF       Total Minutes 11  -EF                User Key  (r) = Recorded By, (t) = Taken By, (c) = Cosigned By      Initials Name Provider Type    EF Angelina Mills PT Physical Therapist                  Therapy Charges for Today       Code Description Service Date Service Provider Modifiers Qty    [id number]  PT THERAPEUTIC ACT EA 15 MIN 10/7/2024 Angelina Mills PT GP 1            PT G-Codes  Outcome Measure Options: AM-PAC 6 Clicks Basic Mobility (PT)  AM-PAC 6 Clicks Score (PT): 19  AM-PAC 6 Clicks Score (OT): 19  Modified [place] Scale: 3 - Moderate disability.  Requiring some help, but able to walk without assistance.  PT Discharge Summary  Anticipated Discharge Disposition (PT): home with assist    Angelina Mills PT  10/7/2024

## 2024-10-07 NOTE — PROGRESS NOTES
[facility] Progress Note      Patient Name: Anita Frankel  : 1940  MRN: [id number]  Primary Care Physician:  Rowan Bauer MD  Date of admission: 2024    Subjective     Interval History:   Follow-up acute kidney injury on chronic kidney disease.    The patient is feeling better, denies any chest pain or shortness of air, no orthopnea or PND, no nausea or vomiting.    Review of Systems:   As noted above    Objective     Vitals:   Temp:  [98.4 °F (36.9 °C)-99.7 °F (37.6 °C)] 98.4 °F (36.9 °C)  Heart Rate:  [86-91] 86  Resp:  [18] 18  BP: (129-135)/(54-60) 133/54  Flow (L/min):  [2] 2    Intake/Output Summary (Last 24 hours) at 10/7/2024 1138  Last data filed at 10/7/2024 0930  Gross per 24 hour   Intake 690 ml   Output --   Net 690 ml       Physical Exam:    General Appearance: alert, awake, chronically, no acute distress   Skin: warm and dry  HEENT: oral mucosa normal, nonicteric sclera  Neck: No JVD  Lungs: CTA, unlabored breathing effort  Heart: RRR, no rub  Abdomen: soft, nontender, nondistended  : no palpable bladder  Extremities: no edema, cyanosis or clubbing  Neuro: Moving all extremities.    Scheduled Meds:     amLODIPine, 10 mg, Oral, Nightly  aspirin, 81 mg, Oral, Nightly  cefTRIAXone, 1,000 mg, Intravenous, Q24H  famotidine, 20 mg, Oral, BID  memantine, 10 mg, Oral, Q12H  metoprolol succinate XL, 25 mg, Oral, Q24H  polycarbophil, 625 mg, Oral, Daily  rivastigmine, 6 mg, Oral, BID With Meals  rosuvastatin, 10 mg, Oral, Nightly  sodium bicarbonate, 650 mg, Oral, TID      IV Meds:        Results Reviewed:   I have personally reviewed the results from the time of this admission to 10/7/2024 11:38 EDT     Results from last 7 days   Lab Units 10/07/24  0612 10/06/24  0448 10/05/24  0358 10/03/24  0416 10/02/24  0327 10/01/24  0248 24  1408   SODIUM mmol/L 138 136 136   < > 137 136 135*   POTASSIUM mmol/L 4.0 3.8 3.8   < > 3.9 3.6 4.0   CHLORIDE mmol/L  103 103 100   < > 108* 104 101   CO2 mmol/L 23.5 22.3 23.8   < > 17.0* 16.4* 17.0*   BUN mg/dL 22 21 19   < > 32* 43* 53*   CREATININE mg/dL 1.57* 1.70* 1.49*   < > 1.65* 2.15* 2.97*   CALCIUM mg/dL 9.2 9.0 9.5   < > 9.4 9.2 9.8   BILIRUBIN mg/dL  --   --   --   --  0.3 0.3 0.5   ALK PHOS U/L  --   --   --   --  123* 156* 159*   ALT (SGPT) U/L  --   --   --   --  12 14 15   AST (SGOT) U/L  --   --   --   --  16 20 22   GLUCOSE mg/dL 92 90 90   < > 92 94 109*    < > = values in this interval not displayed.       Estimated Creatinine Clearance: 24.8 mL/min (A) (by C-G formula based on SCr of 1.57 mg/dL (H)).    Results from last 7 days   Lab Units 10/05/24  0358 10/04/24  0448 10/03/24  0416 10/02/24  0327   MAGNESIUM mg/dL 1.6 1.8  --  2.1   PHOSPHORUS mg/dL 3.3 3.4 2.9 2.5       Results from last 7 days   Lab Units 10/03/24  0416 10/02/24  0327 10/01/24  0248   URIC ACID mg/dL 9.1* 9.7* 8.9*       Results from last 7 days   Lab Units 10/07/24  0612 10/06/24  0448 10/05/24  0358 10/04/24  0448 10/03/24  0416   WBC 10*3/mm3 9.38 12.22* 14.91* 15.93* 13.06*   HEMOGLOBIN g/dL 8.1* 8.3* 9.4* 9.7* 10.0*   PLATELETS 10*3/mm3 309 309 385 369 306             Assessment / Plan     ASSESSMENT:  Acute kidney injury on chronic kidney disease associate with poor oral intake and limited autoregulation because of ARB.  Creatinine today 1.57, slightly improved since yesterday, electrolyte within acceptable range and the patient appears to be euvolemic  Left cystic renal mass followed by   UTI, treated with improved leukocytosis  History of dementia  Hypertension with chronic kidney disease well-controlled  SVT was controlled rate at this time  He may have chronic kidney disease hemoglobin 8.1 will monitor.  Will not give CLAUDIA until the infection is cleared.      PLAN:  Continue the same treatment  Surveillance labs  After discharge she needs follow-up in our office also she need follow-up with .  I reviewed the chart and other  providers notes, reviewed labs.  Copied text in this note has been reviewed and is accurate as of 10/07/24.       Thank you for involving us in the care of Anita Frankel.  Please feel free to call with any questions.    Harish Gregorio MD  10/07/24  11:38 EDT    [facility]  [phone]    Please note that portions of this note were completed with a voice recognition program.

## 2024-10-07 NOTE — PLAN OF CARE
Goal Outcome Evaluation:  Plan of Care Reviewed With: patient        Progress: no change  Outcome Evaluation: Pt agreeable to PT and able to maintain activity level during therapy session today. Pt performed bed mobility with supervision and transfers with SBA. Pt ambulated 100' with CGA. Pt slightly unsteady during turns and continues to require assist x1; however, demos no overt LOB. Pt will continue to benefit from PT to address endurance and balance. No new PT concerns, recommend DC home.      Anticipated Discharge Disposition (PT): home with assist

## 2024-10-07 NOTE — PROGRESS NOTES
"[facility]  Cardiology Progress Note    Patient Identification:  Name: Anita Frankel  Age: 84 y.o.  Sex: female  :  1940  MRN: [id number]                 Follow Up / Chief Complaint: follow up for svt    Interval History: Resting in bed.  No palpitations, chest pain or shortness of breath.      Objective:    Past Medical History:  Past Medical History:   Diagnosis Date    Hypertension     Kidney stone      Past Surgical History:  Past Surgical History:   Procedure Laterality Date    ABDOMINAL SURGERY      polyp removal    CHOLECYSTECTOMY      HYSTERECTOMY      TONSILLECTOMY          Social History:   Social History     Tobacco Use    Smoking status: Never     Passive exposure: Past    Smokeless tobacco: Never   Substance Use Topics    Alcohol use: Yes     Comment: very rarely      Family History:  History reviewed. No pertinent family history.       Allergies:  No Known Allergies  Scheduled Meds:  amLODIPine, 10 mg, Nightly  aspirin, 81 mg, Nightly  cefTRIAXone, 1,000 mg, Q24H  famotidine, 20 mg, BID  memantine, 10 mg, Q12H  metoprolol succinate XL, 25 mg, Q24H  polycarbophil, 625 mg, Daily  rivastigmine, 6 mg, BID With Meals  rosuvastatin, 10 mg, Nightly  sodium bicarbonate, 650 mg, TID            INTAKE AND OUTPUT:    Intake/Output Summary (Last 24 hours) at 10/7/2024 1228  Last data filed at 10/7/2024 0930  Gross per 24 hour   Intake 690 ml   Output --   Net 690 ml       ROS  Constitutional: Awake and alert, no fever.  No nosebleeds  Abdomen           no abdominal pain    Cardiac              no chest pain   Pulmonary         no shortness of breath    /54 (BP Location: Right arm, Patient Position: Lying)   Pulse 86   Temp 98.4 °F (36.9 °C) (Oral)   Resp 18   Ht 160 cm (63\")   Wt 73.8 kg (162 lb 9.6 oz)   SpO2 97%   BMI 28.80 kg/m²     Physical Exam:  General:  Appears in no acute distress, resting in bed  Eyes: eom normal no conjunctival drainage  HEENT:  No JVD. Thyroid not " visibly enlarged. No mucosal pallor or cyanosis  Respiratory: Respirations regular and unlabored at rest. BBS with good air entry in all fields. No crackles, rubs or wheezes auscultated  Cardiovascular: S1S2 Regular rate and rhythm. No murmur, rub or gallop. No carotid bruits. DP/PT pulses     . No pretibial pitting edema  Gastrointestinal: Abdomen soft, flat, non tender. Bowel sounds present. No hepatosplenomegaly. No ascites  Skin:   Skin warm and dry to touch. No rashes    Neuro: AAO x3 CN II-XII grossly intact  Psych: Mood and affect normal, pleasant and cooperative          I reviewed the patient's new clinical results, and personally reviewed and interpreted the patient's ECG and telemetry data from the last 24 hours        Cardiographics       ECG:      :                   Echocardiogram:        Interpretation Summary         Left ventricular systolic function is hyperdynamic (EF > 70%). Calculated left ventricular EF = 74.6%    The following left ventricular wall segments are hyperkinetic: basal anterior, basal anterolateral, basal inferolateral, basal inferior, basal inferoseptal, basal anteroseptal, mid anterior, mid anterolateral, mid inferolateral, mid inferior, mid inferoseptal, mid anteroseptal, apical anterior, apical lateral, apical inferior, apical septal and apex.    Left ventricular diastolic function was normal.    Moderate tricuspid valve regurgitation is present.    Estimated right ventricular systolic pressure from tricuspid regurgitation is mildly elevated (35-45 mmHg). Calculated right ventricular systolic pressure from tricuspid regurgitation is 43 mmHg.        Stress test pending     Imaging     Narrative & Impression   CT ABDOMEN PELVIS WO CONTRAST-     DATE OF EXAM: 9/30/2024 2:32 PM     INDICATION: Left flank pain, suspect kidney stone.     COMPARISON: Chest radiographs 8/21/2018.     TECHNIQUE: Multiple contiguous axial images were acquired through the  abdomen and pelvis without the  intravenous administration of contrast.  Reformatted coronal and sagittal sequences were also reviewed. Radiation  dose reduction techniques were utilized, including automated exposure  control and exposure modulation based on body size.     FINDINGS:  Multifocal bibasilar subsegmental atelectasis and/or scarring. Partially  imaged calcified coronary artery disease. Aortic valve calcifications.     Incompletely evaluated 1.5 cm low-density lesion in the right lobe of  the liver, statistically representing a hepatic cyst or hemangioma.  Status post cholecystectomy. The spleen is unremarkable in limited  noncontrast CT appearance. Fatty atrophy of the pancreas. Nonspecific  but possibly benign low-density nodular thickening of both adrenal  glands. Complex bilobed heterogeneous predominantly low-attenuation  masslike collection in the upper pole of the left kidney and extending  posteriorly into the retroperitoneal fat, measuring approximately 8.5 cm  x 5 cm in axial dimensions (axial series 2 image 51) and 7 cm  craniocaudal (coronal series 4 image 74). Tiny 2 mm nonobstructing  calcification in the lower pole the right kidney. The right kidney is  otherwise unremarkable in limited noncontrast CT appearance. No  obstructing renal or ureteral stone is identified. No hydronephrosis or  hydroureter. The urinary bladder is nondistended. Status post  hysterectomy. The adnexa not definitively identified.     Mild colorectal stool. Colonic diverticula, without CT evidence of  diverticulitis. Mild diffuse colonic wall thickening could be  accentuated by underdistention but could reflect a nonspecific colitis.  No bowel obstruction. The appendix is normal.     No free fluid in the abdomen or pelvis. No free intraperitoneal air. No  pathologically enlarged lymph nodes are identified, although evaluation  is mildly limited by the lack of intravenous contrast. Moderate to  severe calcified atherosclerotic disease in the  abdominal aorta and its  distal branches without aneurysm.     Mild diffuse anasarca. Rectus abdominis diastases. Anterior lower  abdominal wall surgical scar. Diffuse osteopenia. Transitional  lumbosacral vertebral anatomy with partially lumbarized S1 vertebral  segment and rudimentary S1-S2 disc space. Nonspecific sclerotic foci in  the right T10 transverse process and the right side of the sacrum at the  S4 vertebral level, possible benign bone islands. Mild to moderate  bilateral hip and SI joint DJD and mild to moderate DJD of the pubic  symphysis with chondrocalcinosis. No acute osseous abnormality is  identified.     IMPRESSION:     1. Nonspecific complex bilobed heterogeneous predominately  low-attenuation masslike collection in the upper pole of the left kidney  and extending posteriorly into the retroperitoneal fat. Findings could  represent a primary renal neoplasm or possible pyelonephritis with  intrarenal and perirenal abscess. Correlate with urinalysis. Could  consider further evaluation with focused ultrasound or dedicated pre and  postcontrast CT or MRI if clinically indicated.  2. Mild diffuse anasarca.  3. Incompletely evaluated 1.5 cm low-density lesion in the right lobe of  the liver, statistically representing a hepatic cyst or hemangioma.  Recommend attention on follow-up.  4. Sclerotic foci in the right T10 transverse process and right side of  the sacrum, possible benign bone islands. Could consider further  evaluation with routine outpatient nuclear medicine bone scan if  clinically indicated.  5. Mild diffuse colonic wall thickening could be accentuated by  underdistention but could reflect a nonspecific colitis.     This report was finalized on 9/30/2024 3:41 PM by Dom Dalton MD on  Workstation: [id number]     Lab Review   Results from last 7 days   Lab Units 10/03/24  0416 10/02/24  2035 10/02/24  1824   HSTROP T ng/L 25* 24* 24*     Results from last 7 days   Lab Units  "10/05/24  0358   MAGNESIUM mg/dL 1.6     Results from last 7 days   Lab Units 10/07/24  0612   SODIUM mmol/L 138   POTASSIUM mmol/L 4.0   BUN mg/dL 22   CREATININE mg/dL 1.57*   CALCIUM mg/dL 9.2     @[id number]@  Results from last 7 days   Lab Units 10/07/24  0612 10/06/24  0448 10/05/24  0358   WBC 10*3/mm3 9.38 12.22* 14.91*   HEMOGLOBIN g/dL 8.1* 8.3* 9.4*   HEMATOCRIT % 24.9* 25.3* 28.5*   PLATELETS 10*3/mm3 309 309 385      10/2/24             10/1/24    Interpretation Summary         Left ventricular systolic function is hyperdynamic (EF > 70%). Calculated left ventricular EF = 74.6%    The following left ventricular wall segments are hyperkinetic: basal anterior, basal anterolateral, basal inferolateral, basal inferior, basal inferoseptal, basal anteroseptal, mid anterior, mid anterolateral, mid inferolateral, mid inferior, mid inferoseptal, mid anteroseptal, apical anterior, apical lateral, apical inferior, apical septal and apex.    Left ventricular diastolic function was normal.    Moderate tricuspid valve regurgitation is present.    Estimated right ventricular systolic pressure from tricuspid regurgitation is mildly elevated (35-45 mmHg). Calculated right ventricular systolic pressure from tricuspid regurgitation is 43 mmHg.       Assessment:    SHARON on CKD: Nephrology following  Left renal cystic mass  SVT on 10/1  Dementia   hypertension   Anemia  hypomagnesia      Plan:  -Remains in sinus rhythm.-She will go home with a 7-day ZIO  -Blood pressure stable.-Echo with EF 74.6%, several walls hypokinetic.  LV diastolic function normal.  Moderate TV regurgitation.  Mildly elevated RVSP.    Okay to go home from our standpoint.  She will be discharged with a 7-day Holter monitor.  She will follow-up on October 30 at 1 PM      GLENN Goode10/7/2024         EMR Dragon/Transcription:   \"Dictated utilizing Dragon dictation\".     "

## 2024-10-07 NOTE — PROGRESS NOTES
"  Infectious Diseases Progress Note    Hugh Lowe MD     [facility]  Los: 5 days  Patient Identification:  Name: Anita Frankel  Age: 84 y.o.  Sex: female  :  1940  MRN: [id number]         Primary Care Physician: Rowan Bauer MD        Subjective: Feeling better still has low-grade fever denies any fever and chills.  According to the daughter at the bedside mental status is much improved.  Interval History: See consultation note.    Objective:    Scheduled Meds:amLODIPine, 10 mg, Oral, Nightly  aspirin, 81 mg, Oral, Nightly  cefTRIAXone, 1,000 mg, Intravenous, Q24H  famotidine, 20 mg, Oral, BID  memantine, 10 mg, Oral, Q12H  metoprolol succinate XL, 25 mg, Oral, Q24H  polycarbophil, 625 mg, Oral, Daily  rivastigmine, 6 mg, Oral, BID With Meals  rosuvastatin, 10 mg, Oral, Nightly  sodium bicarbonate, 650 mg, Oral, TID      Continuous Infusions:     Vital signs in last 24 hours:  Temp:  [98.2 °F (36.8 °C)-99 °F (37.2 °C)] 99 °F (37.2 °C)  Heart Rate:  [] 87  Resp:  [18] 18  BP: (129-158)/(58-63) 135/60    Intake/Output:    Intake/Output Summary (Last 24 hours) at 10/6/2024 2045  Last data filed at 10/6/2024 1750  Gross per 24 hour   Intake 240 ml   Output --   Net 240 ml       Exam:  /60 (BP Location: Right arm, Patient Position: Sitting)   Pulse 87   Temp 99 °F (37.2 °C) (Oral)   Resp 18   Ht 160 cm (63\")   Wt 74 kg (163 lb 3.2 oz)   SpO2 95%   BMI 28.91 kg/m²   Patient is examined using the personal protective equipment as per guidelines from infection control for this particular patient as enacted.  Hand washing was performed before and after patient interaction.  General Appearance:    Alert, cooperative, no distress, AAOx3                          Head:    Normocephalic, without obvious abnormality, atraumatic                           Eyes:    PERRL, conjunctivae/corneas clear, EOM's intact, both eyes                         Throat:   Lips, tongue, gums " normal; oral mucosa pink and moist                           Neck:   Supple, symmetrical, trachea midline, no JVD                         Lungs:    Clear to auscultation bilaterally, respirations unlabored                 Chest Wall:    No tenderness or deformity                          Heart:  S1-S2 regular                  Abdomen:   Soft nontender                 Extremities:   Extremities normal, atraumatic, no cyanosis or edema                        Pulses:   Pulses palpable in all extremities                            Skin:   Skin is warm and dry,  no rashes or palpable lesions                  Neurologic: Awake interactive and pleasant       Data Review:    I reviewed the patient's new clinical results.  Results from last 7 days   Lab Units 10/06/24  0448 10/05/24  0358 10/04/24  0448 10/03/24  0416 10/02/24  0327 10/01/24  0248 09/30/24  1408   WBC 10*3/mm3 12.22* 14.91* 15.93* 13.06* 14.14* 15.88* 17.12*   HEMOGLOBIN g/dL 8.3* 9.4* 9.7* 10.0* 9.3* 9.4* 10.6*   PLATELETS 10*3/mm3 309 385 369 306 288 270 267     Results from last 7 days   Lab Units 10/06/24  0448 10/05/24  0358 10/04/24  0448 10/03/24  0416 10/02/24  0327 10/01/24  0248 09/30/24  1408   SODIUM mmol/L 136 136 137 137 137 136 135*   POTASSIUM mmol/L 3.8 3.8 3.5 4.0 3.9 3.6 4.0   CHLORIDE mmol/L 103 100 103 106 108* 104 101   CO2 mmol/L 22.3 23.8 20.4* 18.7* 17.0* 16.4* 17.0*   BUN mg/dL 21 19 21 25* 32* 43* 53*   CREATININE mg/dL 1.70* 1.49* 1.52* 1.56* 1.65* 2.15* 2.97*   CALCIUM mg/dL 9.0 9.5 9.3 9.4 9.4 9.2 9.8   GLUCOSE mg/dL 90 90 92 95 92 94 109*   MRI Abdomen With & Without Contrast    Addendum Date: 10/5/2024    ADDENDUM: 10 05 24 07:22 Verify Receipt with Nurse Verified receipt with Nurse Mable WALL; report going to Dr. Rush on 10 05 07:22 (-04:00)     Result Date: 10/5/2024  Communications:  Verify Receipt with Nurse Electronically signed by Demian Larry MD on 10-05-24 at 0646    US Renal Bilateral    Result Date:  9/30/2024  Multiple left renal cysts. No solid renal mass was identified by ultrasound, suggest follow-up evaluation with enhanced renal imaging if not contraindicated. No hydronephrosis or echogenic nephrolithiasis.   This report was finalized on 9/30/2024 5:53 PM by Dr. Otf Cardenas M.D on Workstation: [id number]      CT Abdomen Pelvis Without Contrast    Result Date: 9/30/2024   1. Nonspecific complex bilobed heterogeneous predominately low-attenuation masslike collection in the upper pole of the left kidney and extending posteriorly into the retroperitoneal fat. Findings could represent a primary renal neoplasm or possible pyelonephritis with intrarenal and perirenal abscess. Correlate with urinalysis. Could consider further evaluation with focused ultrasound or dedicated pre and postcontrast CT or MRI if clinically indicated. 2. Mild diffuse anasarca. 3. Incompletely evaluated 1.5 cm low-density lesion in the right lobe of the liver, statistically representing a hepatic cyst or hemangioma. Recommend attention on follow-up. 4. Sclerotic foci in the right T10 transverse process and right side of the sacrum, possible benign bone islands. Could consider further evaluation with routine outpatient nuclear medicine bone scan if clinically indicated. 5. Mild diffuse colonic wall thickening could be accentuated by underdistention but could reflect a nonspecific colitis.  This report was finalized on 9/30/2024 3:41 PM by Dom Dalton MD on Workstation: [id number]     Microbiology Results (last 10 days)       Procedure Component Value - Date/Time    Blood Culture - Blood, Arm, Right [[id number]]  (Normal) Collected: 09/30/24 1631    Lab Status: Final result Specimen: Blood from Arm, Right Updated: 10/05/24 1645     Blood Culture No growth at 5 days    Blood Culture - Blood, Arm, Left [[id number]]  (Normal) Collected: 09/30/24 1622    Lab Status: Final result Specimen: Blood from Arm, Left Updated: 10/05/24 1645      Blood Culture No growth at 5 days    Urine Culture - Urine, Straight Cath [[id number]]  (Abnormal)  (Susceptibility) Collected: 09/30/24 1610    Lab Status: Final result Specimen: Urine from Straight Cath Updated: 10/04/24 0858     Urine Culture >100,000 CFU/mL Escherichia coli    Narrative:      Colonization of the urinary tract without infection is common. Treatment is discouraged unless the patient is symptomatic, pregnant, or undergoing an invasive urologic procedure.    Susceptibility        Escherichia coli      TIO      Amoxicillin + Clavulanate Susceptible      Ampicillin Resistant      Ampicillin + Sulbactam Intermediate      Cefazolin Susceptible      Cefepime Susceptible      Ceftazidime Susceptible      Ceftriaxone Susceptible      Gentamicin Susceptible      Levofloxacin Susceptible      Nitrofurantoin Susceptible      Piperacillin + Tazobactam Susceptible      Trimethoprim + Sulfamethoxazole Susceptible                                       Assessment:    SHARON (acute kidney injury)  1-complicated urinary tract infection with pyelonephritis and probable renal abscess.  2-given her age and prior history of kidney stones other concomitant noninfectious processes such as malignancy and bladder stone need to be considered while being treated aggressively for renal abscess due to complicated pyelonephritis.  3-dementia with transient altered mental status overall improved  4-acute kidney injury likely on chronic kidney disease multifactorial clinically improving  5-hypertension  6-anemia  7-other diagnoses per primary team.     Recommendations/Discussions:  See my discussion and recommendation on 10/5/2024.    Plans for IR consult and aspiration/drainage of masslike collection in the left perinephric region noted.  Continue with IV Rocephin.  Follow-up on blood cultures and sample aspirated from left nephric/perinephric mass drainage.  Hugh Lowe MD  10/6/2024  20:45 EDT    Parts of this note may be an  electronic transcription/translation of spoken language to printed text using the Dragon dictation system.

## 2024-10-08 ENCOUNTER — DOCUMENTATION (OUTPATIENT)
Dept: HOME HEALTH SERVICES | Facility: HOME HEALTHCARE | Age: 84
End: 2024-10-08
Payer: MEDICARE

## 2024-10-08 PROBLEM — N17.9 ACUTE RENAL FAILURE: Status: ACTIVE | Noted: 2024-09-30

## 2024-10-08 LAB
ALBUMIN SERPL-MCNC: 3.1 G/DL (ref 3.5–5.2)
ANION GAP SERPL CALCULATED.3IONS-SCNC: 9.6 MMOL/L (ref 5–15)
BASOPHILS # BLD AUTO: 0.03 10*3/MM3 (ref 0–0.2)
BASOPHILS NFR BLD AUTO: 0.3 % (ref 0–1.5)
BUN SERPL-MCNC: 21 MG/DL (ref 8–23)
BUN/CREAT SERPL: 13.2 (ref 7–25)
CALCIUM SPEC-SCNC: 9.2 MG/DL (ref 8.6–10.5)
CHLORIDE SERPL-SCNC: 102 MMOL/L (ref 98–107)
CO2 SERPL-SCNC: 24.4 MMOL/L (ref 22–29)
CREAT SERPL-MCNC: 1.59 MG/DL (ref 0.57–1)
DEPRECATED RDW RBC AUTO: 39.8 FL (ref 37–54)
EGFRCR SERPLBLD CKD-EPI 2021: 31.5 ML/MIN/1.73
EOSINOPHIL # BLD AUTO: 0.18 10*3/MM3 (ref 0–0.4)
EOSINOPHIL NFR BLD AUTO: 2 % (ref 0.3–6.2)
ERYTHROCYTE [DISTWIDTH] IN BLOOD BY AUTOMATED COUNT: 11.8 % (ref 12.3–15.4)
GLUCOSE SERPL-MCNC: 93 MG/DL (ref 65–99)
HCT VFR BLD AUTO: 26.1 % (ref 34–46.6)
HGB BLD-MCNC: 8.5 G/DL (ref 12–15.9)
IMM GRANULOCYTES # BLD AUTO: 0.12 10*3/MM3 (ref 0–0.05)
IMM GRANULOCYTES NFR BLD AUTO: 1.4 % (ref 0–0.5)
LYMPHOCYTES # BLD AUTO: 1.28 10*3/MM3 (ref 0.7–3.1)
LYMPHOCYTES NFR BLD AUTO: 14.5 % (ref 19.6–45.3)
MAGNESIUM SERPL-MCNC: 1.7 MG/DL (ref 1.6–2.4)
MCH RBC QN AUTO: 30.1 PG (ref 26.6–33)
MCHC RBC AUTO-ENTMCNC: 32.6 G/DL (ref 31.5–35.7)
MCV RBC AUTO: 92.6 FL (ref 79–97)
MONOCYTES # BLD AUTO: 0.95 10*3/MM3 (ref 0.1–0.9)
MONOCYTES NFR BLD AUTO: 10.8 % (ref 5–12)
NEUTROPHILS NFR BLD AUTO: 6.27 10*3/MM3 (ref 1.7–7)
NEUTROPHILS NFR BLD AUTO: 71 % (ref 42.7–76)
NRBC BLD AUTO-RTO: 0 /100 WBC (ref 0–0.2)
PHOSPHATE SERPL-MCNC: 3.4 MG/DL (ref 2.5–4.5)
PLATELET # BLD AUTO: 337 10*3/MM3 (ref 140–450)
PMV BLD AUTO: 11 FL (ref 6–12)
POTASSIUM SERPL-SCNC: 3.9 MMOL/L (ref 3.5–5.2)
RBC # BLD AUTO: 2.82 10*6/MM3 (ref 3.77–5.28)
SODIUM SERPL-SCNC: 136 MMOL/L (ref 136–145)
URATE SERPL-MCNC: 8.5 MG/DL (ref 2.4–5.7)
WBC NRBC COR # BLD AUTO: 8.83 10*3/MM3 (ref 3.4–10.8)

## 2024-10-08 PROCEDURE — 80069 RENAL FUNCTION PANEL: CPT | Performed by: INTERNAL MEDICINE

## 2024-10-08 PROCEDURE — 97535 SELF CARE MNGMENT TRAINING: CPT

## 2024-10-08 PROCEDURE — 25010000002 CEFTRIAXONE PER 250 MG: Performed by: HOSPITALIST

## 2024-10-08 PROCEDURE — 83735 ASSAY OF MAGNESIUM: CPT | Performed by: INTERNAL MEDICINE

## 2024-10-08 PROCEDURE — 84550 ASSAY OF BLOOD/URIC ACID: CPT | Performed by: INTERNAL MEDICINE

## 2024-10-08 PROCEDURE — 99222 1ST HOSP IP/OBS MODERATE 55: CPT

## 2024-10-08 PROCEDURE — 85025 COMPLETE CBC W/AUTO DIFF WBC: CPT | Performed by: INTERNAL MEDICINE

## 2024-10-08 PROCEDURE — 99232 SBSQ HOSP IP/OBS MODERATE 35: CPT | Performed by: NURSE PRACTITIONER

## 2024-10-08 RX ORDER — HEPARIN SODIUM (PORCINE) LOCK FLUSH IV SOLN 100 UNIT/ML 100 UNIT/ML
3 SOLUTION INTRAVENOUS DAILY PRN
OUTPATIENT
Start: 2024-10-08

## 2024-10-08 RX ORDER — SODIUM CHLORIDE 0.9 % (FLUSH) 0.9 %
10 SYRINGE (ML) INJECTION EVERY 12 HOURS SCHEDULED
OUTPATIENT
Start: 2024-10-08

## 2024-10-08 RX ORDER — SODIUM CHLORIDE 0.9 % (FLUSH) 0.9 %
10 SYRINGE (ML) INJECTION AS NEEDED
OUTPATIENT
Start: 2024-10-08

## 2024-10-08 RX ORDER — SODIUM CHLORIDE 0.9 % (FLUSH) 0.9 %
20 SYRINGE (ML) INJECTION AS NEEDED
OUTPATIENT
Start: 2024-10-08

## 2024-10-08 RX ADMIN — ASPIRIN 81 MG: 81 TABLET, COATED ORAL at 20:53

## 2024-10-08 RX ADMIN — MEMANTINE HYDROCHLORIDE 10 MG: 10 TABLET, FILM COATED ORAL at 08:49

## 2024-10-08 RX ADMIN — MEMANTINE HYDROCHLORIDE 10 MG: 10 TABLET, FILM COATED ORAL at 20:53

## 2024-10-08 RX ADMIN — ROSUVASTATIN CALCIUM 10 MG: 10 TABLET, FILM COATED ORAL at 20:53

## 2024-10-08 RX ADMIN — FAMOTIDINE 20 MG: 20 TABLET, FILM COATED ORAL at 08:49

## 2024-10-08 RX ADMIN — SODIUM BICARBONATE 650 MG: 650 TABLET ORAL at 20:53

## 2024-10-08 RX ADMIN — RIVASTIGMINE TARTRATE 6 MG: 1.5 CAPSULE ORAL at 08:49

## 2024-10-08 RX ADMIN — RIVASTIGMINE TARTRATE 6 MG: 1.5 CAPSULE ORAL at 18:09

## 2024-10-08 RX ADMIN — FAMOTIDINE 20 MG: 20 TABLET, FILM COATED ORAL at 20:56

## 2024-10-08 RX ADMIN — CEFTRIAXONE SODIUM 1000 MG: 1 INJECTION, POWDER, FOR SOLUTION INTRAMUSCULAR; INTRAVENOUS at 16:34

## 2024-10-08 RX ADMIN — CALCIUM POLYCARBOPHIL 625 MG: 625 TABLET, FILM COATED ORAL at 08:49

## 2024-10-08 RX ADMIN — AMLODIPINE BESYLATE 10 MG: 10 TABLET ORAL at 20:53

## 2024-10-08 RX ADMIN — METOPROLOL SUCCINATE 25 MG: 25 TABLET, EXTENDED RELEASE ORAL at 08:49

## 2024-10-08 RX ADMIN — SODIUM BICARBONATE 650 MG: 650 TABLET ORAL at 16:34

## 2024-10-08 RX ADMIN — SODIUM BICARBONATE 650 MG: 650 TABLET ORAL at 08:49

## 2024-10-08 NOTE — PLAN OF CARE
Goal Outcome Evaluation:  Plan of Care Reviewed With: patient, daughter           Outcome Evaluation: Pt seen today for OT tx. Performed bed mobility w/ SBA. Performing STS transfers, functional mobility, and sink side ADLs w/ CGA/HHA, unsteadiness noted but no overt LOB. Activity limited today due to L foot pain, RN made aware. Pt continues to present w/ decreased strength, balance, and activity tolerance to perform near baseline. OT will continue to follow to maxmize performance prior to d/c. Plans home w/ HHOT and assist from dtr.      Anticipated Discharge Disposition (OT): home with home health, home with assist

## 2024-10-08 NOTE — PROGRESS NOTES
[facility] Progress Note      Patient Name: Anita Frankel  : 1940  MRN: [id number]  Primary Care Physician:  Rowan Bauer MD  Date of admission: 2024    Subjective     Interval History:   Follow-up acute kidney injury on chronic kidney disease.    The patient is feeling better, denies any chest pain or shortness of air, no orthopnea or PND, no nausea or vomiting.  He is complaining of left foot pain    Review of Systems:   As noted above    Objective     Vitals:   Temp:  [98.4 °F (36.9 °C)-99 °F (37.2 °C)] 99 °F (37.2 °C)  Heart Rate:  [83-89] 84  Resp:  [17-18] 18  BP: (127-143)/(57-65) 127/65    Intake/Output Summary (Last 24 hours) at 10/8/2024 1047  Last data filed at 10/7/2024 2320  Gross per 24 hour   Intake 660 ml   Output --   Net 660 ml       Physical Exam:    General Appearance: alert, awake, chronically, no acute distress   Skin: warm and dry  HEENT: oral mucosa normal, nonicteric sclera  Neck: No JVD  Lungs: CTA, unlabored breathing effort  Heart: RRR, no rub  Abdomen: soft, nontender, nondistended  : no palpable bladder  Extremities: no edema, cyanosis or clubbing, there is tenderness over the dorsum of the left foot but no erythema  Neuro: Moving all extremities.    Scheduled Meds:     amLODIPine, 10 mg, Oral, Nightly  aspirin, 81 mg, Oral, Nightly  cefTRIAXone, 1,000 mg, Intravenous, Q24H  famotidine, 20 mg, Oral, BID  memantine, 10 mg, Oral, Q12H  metoprolol succinate XL, 25 mg, Oral, Q24H  polycarbophil, 625 mg, Oral, Daily  rivastigmine, 6 mg, Oral, BID With Meals  rosuvastatin, 10 mg, Oral, Nightly  sodium bicarbonate, 650 mg, Oral, TID      IV Meds:        Results Reviewed:   I have personally reviewed the results from the time of this admission to 10/8/2024 10:47 EDT     Results from last 7 days   Lab Units 10/08/24  0452 10/07/24  0612 10/06/24  0448 10/03/24  0416 10/02/24  0327   SODIUM mmol/L 136 138 136   < > 137   POTASSIUM mmol/L  3.9 4.0 3.8   < > 3.9   CHLORIDE mmol/L 102 103 103   < > 108*   CO2 mmol/L 24.4 23.5 22.3   < > 17.0*   BUN mg/dL 21 22 21   < > 32*   CREATININE mg/dL 1.59* 1.57* 1.70*   < > 1.65*   CALCIUM mg/dL 9.2 9.2 9.0   < > 9.4   BILIRUBIN mg/dL  --   --   --   --  0.3   ALK PHOS U/L  --   --   --   --  123*   ALT (SGPT) U/L  --   --   --   --  12   AST (SGOT) U/L  --   --   --   --  16   GLUCOSE mg/dL 93 92 90   < > 92    < > = values in this interval not displayed.       Estimated Creatinine Clearance: 24.6 mL/min (A) (by C-G formula based on SCr of 1.59 mg/dL (H)).    Results from last 7 days   Lab Units 10/08/24  0452 10/05/24  0358 10/04/24  0448   MAGNESIUM mg/dL 1.7 1.6 1.8   PHOSPHORUS mg/dL 3.4 3.3 3.4       Results from last 7 days   Lab Units 10/08/24  0452 10/03/24  0416 10/02/24  0327   URIC ACID mg/dL 8.5* 9.1* 9.7*       Results from last 7 days   Lab Units 10/08/24  0452 10/07/24  0612 10/06/24  0448 10/05/24  0358 10/04/24  0448   WBC 10*3/mm3 8.83 9.38 12.22* 14.91* 15.93*   HEMOGLOBIN g/dL 8.5* 8.1* 8.3* 9.4* 9.7*   PLATELETS 10*3/mm3 337 309 309 385 369             Assessment / Plan     ASSESSMENT:  Acute kidney injury on chronic kidney disease associate with poor oral intake and limited autoregulation because of ARB.  Creatinine today 1.59, stable since yesterday, electrolyte within acceptable range and the patient appears to be euvolemic, uric acid is down to 8.5  Left cystic renal mass followed by   UTI, treated with improved leukocytosis  History of dementia  Hypertension with chronic kidney disease well-controlled  SVT was controlled rate at this time  He may have chronic kidney disease hemoglobin 8.5 will monitor.  Will not give CLAUDIA until the infection is cleared.      PLAN:  Continue the same treatment  Surveillance labs  After discharge she needs follow-up in our office also she need follow-up with .    I reviewed the chart and other providers notes, reviewed labs.  I discussed the case with  the patient and her daughter at the bedside  Copied text in this note has been reviewed and is accurate as of 10/08/24.       Thank you for involving us in the care of Anita Frankel.  Please feel free to call with any questions.    Harish Gregorio MD  10/08/24  10:47 EDT    [facility]  [phone]    Please note that portions of this note were completed with a voice recognition program.

## 2024-10-08 NOTE — PROGRESS NOTES
"  [facility] PROGRESS NOTE      Name: Anita Frankel  Age: 84 y.o.  Sex: female  :  1940  MRN: [id number]    Date: 10/8/2024             Subjective:  Interval History:   Doing well this AM  No fever, minimal pain  WBC wnl  Daughter at bedside       Objective:    Vital signs in last 24 hours:  Temp:  [98.4 °F (36.9 °C)-98.6 °F (37 °C)] 98.4 °F (36.9 °C)  Heart Rate:  [83-89] 83  Resp:  [17-18] 18  BP: (128-143)/(57-64) 138/57    Intake/Output:    Intake/Output Summary (Last 24 hours) at 10/8/2024 0723  Last data filed at 10/7/2024 2320  Gross per 24 hour   Intake 900 ml   Output --   Net 900 ml       Exam:  /57 (BP Location: Right arm, Patient Position: Lying)   Pulse 83   Temp 98.4 °F (36.9 °C) (Oral)   Resp 18   Ht 160 cm (63\")   Wt 74.9 kg (165 lb 2 oz)   SpO2 97%   BMI 29.25 kg/m²     General Appearance:    Alert, cooperative, no acute distress   Back:     Symmetric, no CVA tenderness   Lungs:     Respirations unlabored   Heart:    Regular rate and rhythm   Abdomen:    S,NT, ND   Genitalia:   Not examined   Extremities:   Extremities normal, atraumatic, no cyanosis or edema   Skin:   Skin color, texture, turgor normal, no rashes or lesions        Assessment:    SHARON (acute kidney injury)        Assessment plan:     84-year-old female with a  1.  Left cystic mass; new and undiagnosed  2.  Acute cystitis; chronic stable  3.  Acute pyelonephritis; chronic stable     Lab Results   Component Value Date    WBC 8.83 10/08/2024     Lab Results   Component Value Date    CREATININE 1.59 (H) 10/08/2024     Labs stable       -MRI - equivocal, likely cyst vs abscess  - Okay to eat, today   - Since leukocytosis resolving, likely no need to aspiration /perc drain  - Urine culture greater than 100,000 E. coli;  on Rocephin  - Recommend treatment course of abx for presumed renal abscess  - Will have patient f/u in 4-6 weeks with repeat imaging (CT w/ IVC vs EMILI) with Dr. Jerald Alaniz ([facility]) to assess resolution  - Please call with questions, urology will sign off at this time    Nena Gonzalez MD  10/8/2024  07:23 EDT

## 2024-10-08 NOTE — PLAN OF CARE
Goal Outcome Evaluation:  Plan of Care Reviewed With: patient, daughter        Progress: improving       Patient to be discharged today. No issues overnight. WCTM

## 2024-10-08 NOTE — CONSULTS
General Surgery     Summary:     Anita Frankel is a 84 y.o. year old with a UTI with pyelonephritis and renal abscess. Patient is requiring long term IV antibiotics, PICC line contraindicated with GFR in 30s. General surgery has been consulted for placement of a Tamayo catheter. I spoke with patient and her daughter, Rhona, I discussed the risks (bleeding, infection, damage to surrounding structures I.e. pneumothorax), benefits, and alternatives, they both wish to proceed with the plan.     Plan:  OR tomorrow for Tamayo catheter placement with Dr. Bryant.   NPO at MN    Chief Complaint:    Consult for Tamayo Catheter    History of Present Illness:     Anita Frankel is a 84 y.o. year old with dementia, CKD, and HTN who presented to the ED on 9/30 with left flank pain. She was found to have a masslike collection on the left kidney. MRI noting likely abscess. Urology has decided no need for percutaneous drain. ID is following and recommending IV antibiotics for at least 2 weeks or until the abscess has resolved on repeat imaging. Currently receiving IV Rocephin. Her WBC has normalized and her vitals are stable.     Past Medical History:      Dementia  CKD  HTN    Past Surgical History:    Past Surgical History:   Procedure Laterality Date    ABDOMINAL SURGERY      polyp removal    CHOLECYSTECTOMY      HYSTERECTOMY      TONSILLECTOMY         Family History:    History reviewed. No pertinent family history.    Social History:    Social History     Socioeconomic History    Marital status:    Tobacco Use    Smoking status: Never     Passive exposure: Past    Smokeless tobacco: Never   Vaping Use    Vaping status: Never Used   Substance and Sexual Activity    Alcohol use: Yes     Comment: very rarely    Drug use: No    Sexual activity: Defer       Allergies:   No Known Allergies    Medications:     Current Facility-Administered Medications:     amLODIPine (NORVASC) tablet 10 mg, 10 mg, Oral, Nightly, Roger Rush,  MD, 10 mg at 10/07/24 2023    aspirin EC tablet 81 mg, 81 mg, Oral, Nightly, Roger Rush MD, 81 mg at 10/07/24 2023    cefTRIAXone (ROCEPHIN) 1,000 mg in sodium chloride 0.9 % 100 mL MBP, 1,000 mg, Intravenous, Q24H, Roger Rush MD, Last Rate: 200 mL/hr at 10/07/24 1719, 1,000 mg at 10/07/24 1719    famotidine (PEPCID) tablet 20 mg, 20 mg, Oral, BID, Roger Rush MD, 20 mg at 10/08/24 0849    memantine (NAMENDA) tablet 10 mg, 10 mg, Oral, Q12H, Roger Rush MD, 10 mg at 10/08/24 0849    metoprolol succinate XL (TOPROL-XL) 24 hr tablet 25 mg, 25 mg, Oral, Q24H, Jeny Rodriguez APRN, 25 mg at 10/08/24 0849    polycarbophil tablet 625 mg, 625 mg, Oral, Daily, Roger Rush MD, 625 mg at 10/08/24 0849    polyethylene glycol (MIRALAX) packet 17 g, 17 g, Oral, BID PRN, Roger Rush MD, 17 g at 10/06/24 1730    rivastigmine (EXELON) capsule 6 mg, 6 mg, Oral, BID With Meals, Roger Rush MD, 6 mg at 10/08/24 0849    rosuvastatin (CRESTOR) tablet 10 mg, 10 mg, Oral, Nightly, Jeny Rodriguez APRN, 10 mg at 10/07/24 2023    sodium bicarbonate tablet 650 mg, 650 mg, Oral, TID, Gomez Mesa MD, 650 mg at 10/08/24 0849    [COMPLETED] Insert Peripheral IV, , , Once **AND** sodium chloride 0.9 % flush 10 mL, 10 mL, Intravenous, PRN, Alberto, Baldo ZACARIAS MD    temazepam (RESTORIL) capsule 30 mg, 30 mg, Oral, Nightly PRN, Roger Rush MD, 30 mg at 10/03/24 2040      Radiology/Endoscopy:     MRI abdomen with and without contrast 10/4/24  IMPRESSION:         The left renal mass represents a complex left renal intraparenchymal   fluid collection with an exophytic component. It is suspicious for an   intraparenchymal renal abscess.  Necrotic cystic renal neoplasm is a less   likely differential consideration.  If appropriate, percutaneous   aspiration could be attempted for both diagnostic and therapeutic   purposes.    Labs:    Results from last 7 days   Lab Units 10/08/24  0452 10/07/24  0612 10/06/24  0448  10/05/24  0358 10/04/24  0448 10/03/24  0416 10/02/24  0327   WBC 10*3/mm3 8.83 9.38 12.22* 14.91* 15.93* 13.06* 14.14*   HEMOGLOBIN g/dL 8.5* 8.1* 8.3* 9.4* 9.7* 10.0* 9.3*   HEMATOCRIT % 26.1* 24.9* 25.3* 28.5* 28.9* 30.6* 28.4*   PLATELETS 10*3/mm3 337 309 309 385 369 306 288     Results from last 7 days   Lab Units 10/08/24  0452 10/07/24  0612 10/06/24  0448 10/03/24  0416 10/02/24  0327   SODIUM mmol/L 136 138 136   < > 137   POTASSIUM mmol/L 3.9 4.0 3.8   < > 3.9   CHLORIDE mmol/L 102 103 103   < > 108*   CO2 mmol/L 24.4 23.5 22.3   < > 17.0*   BUN mg/dL 21 22 21   < > 32*   CREATININE mg/dL 1.59* 1.57* 1.70*   < > 1.65*   CALCIUM mg/dL 9.2 9.2 9.0   < > 9.4   BILIRUBIN mg/dL  --   --   --   --  0.3   ALK PHOS U/L  --   --   --   --  123*   ALT (SGPT) U/L  --   --   --   --  12   AST (SGOT) U/L  --   --   --   --  16   GLUCOSE mg/dL 93 92 90   < > 92    < > = values in this interval not displayed.   BMI 29.25  Wt 74.9 kg    Vitals  Temp 99  HR 82  RR 18  /53  SpO2 97    Review of Systems   Constitutional:  Negative for chills and fever.   HENT:  Negative for sore throat and trouble swallowing.    Respiratory:  Negative for chest tightness and shortness of breath.    Cardiovascular:  Negative for chest pain and palpitations.   Gastrointestinal:  Negative for nausea and vomiting.   Skin:  Negative for rash and wound.   Neurological:  Negative for light-headedness.   Psychiatric/Behavioral:  The patient is not nervous/anxious.         Physical Exam  Constitutional:       General: She is not in acute distress.     Appearance: Normal appearance. She is not ill-appearing.   HENT:      Head: Normocephalic.   Eyes:      Pupils: Pupils are equal, round, and reactive to light.   Cardiovascular:      Pulses: Normal pulses.   Pulmonary:      Effort: Pulmonary effort is normal.   Abdominal:      Palpations: Abdomen is soft.   Skin:     General: Skin is warm and dry.      Coloration: Skin is not jaundiced.    Neurological:      Mental Status: She is alert. Mental status is at baseline.   Psychiatric:         Mood and Affect: Mood normal.         Behavior: Behavior normal.                   GLENN Patel   General and Endoscopic Surgery  [facility]    [address], [address]  [place], [place], [address]  P: [phone]  F: [phone]

## 2024-10-08 NOTE — PROGRESS NOTES
[facility] to provide Home Care services. [facility] to provide ARIES supplies, to bedside before hospital discharge.  Patient and family agreeable. PCP and contact information confirmed.     Patient will discharge to daughter Rhona's home: [address], [place] [address]. Please call her to schedule HH visits, [phone].

## 2024-10-08 NOTE — DISCHARGE PLACEMENT REQUEST
"Frankel, Anita (84 y.o. Female)       Date of Birth   1940    Social Security Number       Address   [address] [place] [address]    Home Phone   [phone]    MRN   [id number]       [employer]ism   [employer]    Marital Status                               Admission Date   9/30/24    Admission Type   Emergency    Admitting Provider   Roger Rush MD    Attending Provider   Roger Rush MD    Department, Room/Bed   [address], [address]1       Discharge Date       Discharge Disposition       Discharge Destination                                 Attending Provider: Roger Rush MD    Allergies: No Known Allergies    Isolation: None   Infection: None   Code Status: No CPR    Ht: 160 cm (63\")   Wt: 74.9 kg (165 lb 2 oz)    Admission Cmt: None   Principal Problem: SHARON (acute kidney injury) [N17.9]                   Active Insurance as of 9/30/2024       Primary Coverage       Payor Plan Insurance Group Employer/Plan Group    MEDICARE MEDICARE A & B        Payor Plan Address Payor Plan Phone Number Payor Plan Fax Number Effective Dates    [address] [phone]  3/1/2003 - None Entered    [facility] [id number]         Subscriber Name Subscriber Birth Date Member ID       FRANKEL,ANITA 1940 [id number]               Secondary Coverage       Payor Plan Insurance Group Employer/Plan Group    [place] [id number]       Payor Plan Address Payor Plan Phone Number Payor Plan Fax Number Effective Dates    [address]   8/19/2014 - None Entered    [facility] [id number]         Subscriber Name Subscriber Birth Date Member ID       FRANKEL,ANITA 1940 [id number]                     Emergency Contacts        (Rel.) Home Phone Work Phone Mobile Phone    Rhona Castillo (Daughter) [phone] -- --    Frankel,Mark (Son) [phone] -- --                "

## 2024-10-08 NOTE — THERAPY TREATMENT NOTE
Patient Name: Anita Frankel  : 1940    MRN: [id number]                              Today's Date: 10/8/2024       Admit Date: 2024    Visit Dx:     ICD-10-CM ICD-9-CM   1. Left flank pain  R10.9 789.09   2. Acute renal failure, unspecified acute renal failure type  N17.9 584.9   3. Leukocytosis, unspecified type  D72.829 288.60   4. Abnormal CT of the abdomen  R93.5 793.6   5. SHARON (acute kidney injury)  N17.9 584.9     Patient Active Problem List   Diagnosis    SHARON (acute kidney injury)     Past Medical History:   Diagnosis Date    Hypertension     Kidney stone      Past Surgical History:   Procedure Laterality Date    ABDOMINAL SURGERY      polyp removal    CHOLECYSTECTOMY      HYSTERECTOMY      TONSILLECTOMY        General Information       Row Name 10/08/24 1211          OT Time and Intention    Document Type therapy note (daily note)  -KG     Mode of Treatment individual therapy;occupational therapy  -KG       Row Name 10/08/24 1211          General Information    Patient Profile Reviewed yes  -KG     Existing Precautions/Restrictions fall  -KG       Row Name 10/08/24 1211          Safety Issues, Functional Mobility    Impairments Affecting Function (Mobility) balance;endurance/activity tolerance;pain;strength  -KG               User Key  (r) = Recorded By, (t) = Taken By, (c) = Cosigned By      Initials Name Provider Type    KG Osman Austin OT Occupational Therapist                     Mobility/ADL's       Row Name 10/08/24 1211          Bed Mobility    Bed Mobility supine-sit;sit-supine  -KG     Supine-Sit [place] (Bed Mobility) standby assist  -KG     Sit-Supine [place] (Bed Mobility) standby assist  -KG       Row Name 10/08/24 1211          Transfers    Transfers sit-stand transfer;stand-sit transfer  -KG       Row Name 10/08/24 1211          Sit-Stand Transfer    Sit-Stand [place] (Transfers) contact guard  -KG     Assistive Device (Sit-Stand Transfers) walker, front-wheeled   -KG       Row Name 10/08/24 1211          Stand-Sit Transfer    Stand-Sit [place] (Transfers) contact guard  -KG     Assistive Device (Stand-Sit Transfers) walker, front-wheeled  -KG       Row Name 10/08/24 1211          Functional Mobility    Functional Mobility- Ind. Level --  Performed functional mobility from EOB to sink, and then back to EOB w/ CGA/HHA. Unsteady but no overt LOB. Also w/ increased L foot pain  -KG       Row Name 10/08/24 1211          Activities of Daily Living    BADL Assessment/Intervention grooming  -KG       Row Name 10/08/24 1211          Grooming Assessment/Training    [place] Level (Grooming) oral care regimen;wash face, hands;contact guard assist  -KG     Position (Grooming) sink side  -KG               User Key  (r) = Recorded By, (t) = Taken By, (c) = Cosigned By      Initials Name Provider Type    Osman Nogueira OT Occupational Therapist                   Obj/Interventions       Row Name 10/08/24 1213          Balance    Balance Assessment sitting static balance;sitting dynamic balance;standing static balance;standing dynamic balance  -KG     Static Sitting Balance standby assist  -KG     Dynamic Sitting Balance standby assist  -KG     Position, Sitting Balance sitting edge of bed  -KG     Static Standing Balance contact guard  -KG     Dynamic Standing Balance contact guard  -KG     Position/Device Used, Standing Balance supported  -KG     Balance Interventions sitting;standing;supported;static;dynamic;occupation based/functional task  -KG               User Key  (r) = Recorded By, (t) = Taken By, (c) = Cosigned By      Initials Name Provider Type    Osman Nogueira OT Occupational Therapist                   Goals/Plan    No documentation.                  Clinical Impression       Row Name 10/08/24 1214          Pain Assessment    Pretreatment Pain Rating 6/10  -KG     Posttreatment Pain Rating 6/10  -KG     Pain Location - Side/Orientation Left  -KG     Pain  Location - foot  -KG     Pain Intervention(s) Repositioned;Rest;Nursing Notified  -KG       Row Name 10/08/24 1214          Plan of Care Review    Plan of Care Reviewed With patient;daughter  -KG     Outcome Evaluation Pt seen today for OT tx. Performed bed mobility w/ SBA. Performing STS transfers, functional mobility, and sink side ADLs w/ CGA/HHA, unsteadiness noted but no overt LOB. Activity limited today due to L foot pain, RN made aware. Pt continues to present w/ decreased strength, balance, and activity tolerance to perform near baseline. OT will continue to follow to maxmize performance prior to d/c. Plans home w/ HHOT and assist from dtr.  -KG       Row Name 10/08/24 1214          Therapy Plan Review/Discharge Plan (OT)    Anticipated Discharge Disposition (OT) home with home health;home with assist  -KG       Row Name 10/08/24 1214          Vital Signs    Pre Patient Position Supine  -KG     Intra Patient Position Standing  -KG     Post Patient Position Supine  -KG       Row Name 10/08/24 1214          Positioning and Restraints    Pre-Treatment Position in bed  -KG     Post Treatment Position bed  -KG     In Bed notified nsg;supine;call light within reach;with family/caregiver;exit alarm on;encouraged to call for assist  -KG               User Key  (r) = Recorded By, (t) = Taken By, (c) = Cosigned By      Initials Name Provider Type    Osman Nogueira OT Occupational Therapist                   Outcome Measures    No documentation.                   Occupational Therapy Education       Title: PT OT SLP Therapies (In Progress)       Topic: Occupational Therapy (In Progress)       Point: ADL training (Done)       Description:   Instruct learner(s) on proper safety adaptation and remediation techniques during self care or transfers.   Instruct in proper use of assistive devices.                  Learning Progress Summary             Patient Acceptance, E, VU by CHANTELL at 10/4/2024 1957    Acceptance, E, VU  by MM at 10/1/2024 1300    Comment: role of OT   Family Acceptance, E, VU by KK at 10/4/2024 1957                         Point: Home exercise program (Not Started)       Description:   Instruct learner(s) on appropriate technique for monitoring, assisting and/or progressing therapeutic exercises/activities.                  Learner Progress:  Not documented in this visit.              Point: Precautions (Done)       Description:   Instruct learner(s) on prescribed precautions during self-care and functional transfers.                  Learning Progress Summary             Patient Acceptance, E, VU by KK at 10/4/2024 1957    Acceptance, E, VU by MM at 10/1/2024 1300    Comment: role of OT   Family Acceptance, E, VU by  at 10/4/2024 1957                         Point: Body mechanics (Done)       Description:   Instruct learner(s) on proper positioning and spine alignment during self-care, functional mobility activities and/or exercises.                  Learning Progress Summary             Patient Acceptance, E, VU by KK at 10/4/2024 1957    Acceptance, E, VU by MM at 10/1/2024 1300    Comment: role of OT   Family Acceptance, E, VU by  at 10/4/2024 1957                                         User Key       Initials Effective Dates Name Provider Type Discipline     11/18/21 -  Camryn Patino RN Registered Nurse Nurse     05/31/24 -  Jackie Moya OT Occupational Therapist OT                  OT Recommendation and Plan     Plan of Care Review  Plan of Care Reviewed With: patient, daughter  Outcome Evaluation: Pt seen today for OT tx. Performed bed mobility w/ SBA. Performing STS transfers, functional mobility, and sink side ADLs w/ CGA/HHA, unsteadiness noted but no overt LOB. Activity limited today due to L foot pain, RN made aware. Pt continues to present w/ decreased strength, balance, and activity tolerance to perform near baseline. OT will continue to follow to maxmize performance prior to d/c. Plans  home w/ HHOT and assist from dtr.     Time Calculation:         Time Calculation- OT       Row Name 10/08/24 1217             Time Calculation- OT    OT Start Time 1030  -KG      OT Stop Time 1047  -KG      OT Time Calculation (min) 17 min  -KG      Total Timed Code Minutes- OT 17 minute(s)  -KG      OT Received On 10/08/24  -KG      OT - Next Appointment 10/10/24  -KG         Timed Charges    51130 - OT Self Care/Mgmt Minutes 17  -KG         Total Minutes    Timed Charges Total Minutes 17  -KG       Total Minutes 17  -KG                User Key  (r) = Recorded By, (t) = Taken By, (c) = Cosigned By      Initials Name Provider Type    KG Osman Austin OT Occupational Therapist                  Therapy Charges for Today       Code Description Service Date Service Provider Modifiers Qty    [id number] HC OT SELF CARE/MGMT/TRAIN EA 15 MIN 10/8/2024 Osman Austin OT GO 1                 Osman Austin OT  10/8/2024

## 2024-10-08 NOTE — PROGRESS NOTES
"Daily progress note    Primary care physician  Dr. Bauer    Subjective  Doing same with no new complaint family at bedside    History of present illness  84-year-old female with history of hypertension and dementia who lives by herself presented to [facility] emergency room with left flank pain for last few days which is getting worse.  Patient also complained of nausea but no vomiting diarrhea.  Patient also denies any fever chills chest pain shortness of breath palpitation.  Patient workup in ER revealed acute kidney injury and left cystic renal mass admitted for management.      REVIEW OF SYSTEMS  Unremarkable except generalized weakness     PHYSICAL EXAM   Blood pressure 132/53, pulse 82, temperature 99 °F (37.2 °C), temperature source Oral, resp. rate 18, height 160 cm (63\"), weight 74.9 kg (165 lb 2 oz), SpO2 97%.    GENERAL: Alert well-appearing female no obvious distress.   SKIN: Warm, dry  HENT: Normocephalic, atraumatic  EYES: no scleral icterus  CV: regular rhythm, regular rate  RESPIRATORY: normal effort, moving air bilaterally  ABDOMEN: soft, nontender, nondistended bowel sounds positive  MUSCULOSKELETAL: no deformity  NEURO: alert, moves all extremities, follows commands     LAB RESULTS  Lab Results (last 24 hours)       Procedure Component Value Units Date/Time    Renal Function Panel [[id number]]  (Abnormal) Collected: 10/08/24 0452    Specimen: Blood Updated: 10/08/24 0553     Glucose 93 mg/dL      BUN 21 mg/dL      Creatinine 1.59 mg/dL      Sodium 136 mmol/L      Potassium 3.9 mmol/L      Chloride 102 mmol/L      CO2 24.4 mmol/L      Calcium 9.2 mg/dL      Albumin 3.1 g/dL      Phosphorus 3.4 mg/dL      Anion Gap 9.6 mmol/L      BUN/Creatinine Ratio 13.2     eGFR 31.5 mL/min/1.73     Narrative:      GFR Normal >60  Chronic Kidney Disease <60  Kidney Failure <15    The GFR formula is only valid for adults with stable renal function between ages 18 and 70.    Uric Acid [[id number]]  " (Abnormal) Collected: 10/08/24 0452    Specimen: Blood Updated: 10/08/24 0553     Uric Acid 8.5 mg/dL     Magnesium [[id number]]  (Normal) Collected: 10/08/24 0452    Specimen: Blood Updated: 10/08/24 0553     Magnesium 1.7 mg/dL     CBC & Differential [[id number]]  (Abnormal) Collected: 10/08/24 0452    Specimen: Blood Updated: 10/08/24 0537    Narrative:      The following orders were created for panel order CBC & Differential.  Procedure                               Abnormality         Status                     ---------                               -----------         ------                     CBC Auto Differential[[id number]]        Abnormal            Final result                 Please view results for these tests on the individual orders.    CBC Auto Differential [[id number]]  (Abnormal) Collected: 10/08/24 0452    Specimen: Blood Updated: 10/08/24 0537     WBC 8.83 10*3/mm3      RBC 2.82 10*6/mm3      Hemoglobin 8.5 g/dL      Hematocrit 26.1 %      MCV 92.6 fL      MCH 30.1 pg      MCHC 32.6 g/dL      RDW 11.8 %      RDW-SD 39.8 fl      MPV 11.0 fL      Platelets 337 10*3/mm3      Neutrophil % 71.0 %      Lymphocyte % 14.5 %      Monocyte % 10.8 %      Eosinophil % 2.0 %      Basophil % 0.3 %      Immature Grans % 1.4 %      Neutrophils, Absolute 6.27 10*3/mm3      Lymphocytes, Absolute 1.28 10*3/mm3      Monocytes, Absolute 0.95 10*3/mm3      Eosinophils, Absolute 0.18 10*3/mm3      Basophils, Absolute 0.03 10*3/mm3      Immature Grans, Absolute 0.12 10*3/mm3      nRBC 0.0 /100 WBC           Imaging Results (Last 24 Hours)       ** No results found for the last 24 hours. **            Current Facility-Administered Medications:     amLODIPine (NORVASC) tablet 10 mg, 10 mg, Oral, Nightly, Roger Rush MD, 10 mg at 10/07/24 2023    aspirin EC tablet 81 mg, 81 mg, Oral, Nightly, Roger Rush MD, 81 mg at 10/07/24 2023    cefTRIAXone (ROCEPHIN) 1,000 mg in sodium chloride 0.9 % 100 mL MBP, 1,000 mg,  Intravenous, Q24H, Latonia Rush MD, Last Rate: 200 mL/hr at 10/07/24 1719, 1,000 mg at 10/07/24 1719    famotidine (PEPCID) tablet 20 mg, 20 mg, Oral, BID, Latonia Rush MD, 20 mg at 10/08/24 0849    memantine (NAMENDA) tablet 10 mg, 10 mg, Oral, Q12H, Latonia Rush MD, 10 mg at 10/08/24 0849    metoprolol succinate XL (TOPROL-XL) 24 hr tablet 25 mg, 25 mg, Oral, Q24H, Jeny Rodriguez APRN, 25 mg at 10/08/24 0849    polycarbophil tablet 625 mg, 625 mg, Oral, Daily, Latonia Rush MD, 625 mg at 10/08/24 0849    polyethylene glycol (MIRALAX) packet 17 g, 17 g, Oral, BID PRN, Latonia Rush MD, 17 g at 10/06/24 1730    rivastigmine (EXELON) capsule 6 mg, 6 mg, Oral, BID With Meals, Latonia Rush MD, 6 mg at 10/08/24 0849    rosuvastatin (CRESTOR) tablet 10 mg, 10 mg, Oral, Nightly, Jeny Rodriguez APRN, 10 mg at 10/07/24 2023    sodium bicarbonate tablet 650 mg, 650 mg, Oral, TID, Gomez Mesa MD, 650 mg at 10/08/24 0849    [COMPLETED] Insert Peripheral IV, , , Once **AND** sodium chloride 0.9 % flush 10 mL, 10 mL, Intravenous, PRN, AlbertoBaldo ortiz MD    temazepam (RESTORIL) capsule 30 mg, 30 mg, Oral, Nightly PRN, Latonia Rush MD, 30 mg at 10/03/24 2040     ASSESSMENT  Acute kidney injury resolving  Left renal mass suspicious for renal abscess  Supraventricular tachycardia  Acute E. coli UTI  Hypertension  Dementia    PLAN  CPM  Continue IV antibiotics per infectious disease  No need for aspiration or percutaneous drain per urology  Patient not candidate for PICC line or midline per nephrology  Need Tamayo placement to complete IV antibiotics as an outpatient  General Surgery consult for Tamayo placement  Control the heart rate  Cardiology nephrology and urology to follow patient  Adjust home medications  Stress ulcer DVT prophylaxis  Supportive care  PT/OT  Discussed with family and nursing staff  Discharge planning    LATONIA RUSH MD    Copied text in this note has been reviewed and is  accurate as of 10/08/24

## 2024-10-08 NOTE — PROGRESS NOTES
"  Infectious Diseases Progress Note    Hugh Lowe MD     [facility]  [place]: 7 days  Patient Identification:  Name: Anita Frankel  Age: 84 y.o.  Sex: female  :  1940  MRN: [id number]         Primary Care Physician: Rowan Bauer MD        Subjective: Continues to do well.  Interval History: See consultation note.    Objective:    Scheduled Meds:amLODIPine, 10 mg, Oral, Nightly  aspirin, 81 mg, Oral, Nightly  cefTRIAXone, 1,000 mg, Intravenous, Q24H  famotidine, 20 mg, Oral, BID  memantine, 10 mg, Oral, Q12H  metoprolol succinate XL, 25 mg, Oral, Q24H  polycarbophil, 625 mg, Oral, Daily  rivastigmine, 6 mg, Oral, BID With Meals  rosuvastatin, 10 mg, Oral, Nightly  sodium bicarbonate, 650 mg, Oral, TID      Continuous Infusions:     Vital signs in last 24 hours:  Temp:  [98.4 °F (36.9 °C)-99 °F (37.2 °C)] 99 °F (37.2 °C)  Heart Rate:  [82-89] 82  Resp:  [18] 18  BP: (127-143)/(53-65) 132/53    Intake/Output:    Intake/Output Summary (Last 24 hours) at 10/8/2024 1630  Last data filed at 10/8/2024 1215  Gross per 24 hour   Intake 570 ml   Output --   Net 570 ml       Exam:  /53 (BP Location: Right arm, Patient Position: Lying)   Pulse 82   Temp 99 °F (37.2 °C) (Oral)   Resp 18   Ht 160 cm (63\")   Wt 74.9 kg (165 lb 2 oz)   SpO2 97%   BMI 29.25 kg/m²   Patient is examined using the personal protective equipment as per guidelines from infection control for this particular patient as enacted.  Hand washing was performed before and after patient interaction.  General Appearance:    Alert, cooperative, no distress, AAOx3                          Head:    Normocephalic, without obvious abnormality, atraumatic                           Eyes:    PERRL, conjunctivae/corneas clear, EOM's intact, both eyes                         Throat:   Lips, tongue, gums normal; oral mucosa pink and moist                           Neck:   Supple, symmetrical, trachea midline, no JVD                 "         Lungs:    Clear to auscultation bilaterally, respirations unlabored                 Chest Wall:    No tenderness or deformity                          Heart:  S1-S2 regular                  Abdomen:   Soft nontender                 Extremities:   Extremities normal, atraumatic, no cyanosis or edema                        Pulses:   Pulses palpable in all extremities                            Skin:   Skin is warm and dry,  no rashes or palpable lesions                  Neurologic: Awake interactive and pleasant       Data Review:    I reviewed the patient's new clinical results.  Results from last 7 days   Lab Units 10/08/24  0452 10/07/24  0612 10/06/24  0448 10/05/24  0358 10/04/24  0448 10/03/24  0416 10/02/24  0327   WBC 10*3/mm3 8.83 9.38 12.22* 14.91* 15.93* 13.06* 14.14*   HEMOGLOBIN g/dL 8.5* 8.1* 8.3* 9.4* 9.7* 10.0* 9.3*   PLATELETS 10*3/mm3 337 309 309 385 369 306 288     Results from last 7 days   Lab Units 10/08/24  0452 10/07/24  0612 10/06/24  0448 10/05/24  0358 10/04/24  0448 10/03/24  0416 10/02/24  0327   SODIUM mmol/L 136 138 136 136 137 137 137   POTASSIUM mmol/L 3.9 4.0 3.8 3.8 3.5 4.0 3.9   CHLORIDE mmol/L 102 103 103 100 103 106 108*   CO2 mmol/L 24.4 23.5 22.3 23.8 20.4* 18.7* 17.0*   BUN mg/dL 21 22 21 19 21 25* 32*   CREATININE mg/dL 1.59* 1.57* 1.70* 1.49* 1.52* 1.56* 1.65*   CALCIUM mg/dL 9.2 9.2 9.0 9.5 9.3 9.4 9.4   GLUCOSE mg/dL 93 92 90 90 92 95 92   MRI Abdomen With & Without Contrast    Addendum Date: 10/5/2024    ADDENDUM: 10 05 24 07:22 Verify Receipt with Nurse Verified receipt with Nurse Mable GALEANO report going to Dr. Rush on 10 05 07:22 (-04:00)     Result Date: 10/5/2024  Communications:  Verify Receipt with Nurse Electronically signed by Demian Larry MD on 10-05-24 at 0646    US Renal Bilateral    Result Date: 9/30/2024  Multiple left renal cysts. No solid renal mass was identified by ultrasound, suggest follow-up evaluation with enhanced renal imaging if not  contraindicated. No hydronephrosis or echogenic nephrolithiasis.   This report was finalized on 9/30/2024 5:53 PM by Dr. Otf Cardenas M.D on Workstation: [id number]      CT Abdomen Pelvis Without Contrast    Result Date: 9/30/2024   1. Nonspecific complex bilobed heterogeneous predominately low-attenuation masslike collection in the upper pole of the left kidney and extending posteriorly into the retroperitoneal fat. Findings could represent a primary renal neoplasm or possible pyelonephritis with intrarenal and perirenal abscess. Correlate with urinalysis. Could consider further evaluation with focused ultrasound or dedicated pre and postcontrast CT or MRI if clinically indicated. 2. Mild diffuse anasarca. 3. Incompletely evaluated 1.5 cm low-density lesion in the right lobe of the liver, statistically representing a hepatic cyst or hemangioma. Recommend attention on follow-up. 4. Sclerotic foci in the right T10 transverse process and right side of the sacrum, possible benign bone islands. Could consider further evaluation with routine outpatient nuclear medicine bone scan if clinically indicated. 5. Mild diffuse colonic wall thickening could be accentuated by underdistention but could reflect a nonspecific colitis.  This report was finalized on 9/30/2024 3:41 PM by Dom Dalton MD on Workstation: [id number]     Microbiology Results (last 10 days)       Procedure Component Value - Date/Time    Blood Culture - Blood, Arm, Right [[id number]]  (Normal) Collected: 09/30/24 1631    Lab Status: Final result Specimen: Blood from Arm, Right Updated: 10/05/24 1645     Blood Culture No growth at 5 days    Blood Culture - Blood, Arm, Left [[id number]]  (Normal) Collected: 09/30/24 1622    Lab Status: Final result Specimen: Blood from Arm, Left Updated: 10/05/24 1645     Blood Culture No growth at 5 days    Urine Culture - Urine, Straight Cath [[id number]]  (Abnormal)  (Susceptibility) Collected: 09/30/24 1610    Lab  Status: Final result Specimen: Urine from Straight Cath Updated: 10/04/24 0858     Urine Culture >100,000 CFU/mL Escherichia coli    Narrative:      Colonization of the urinary tract without infection is common. Treatment is discouraged unless the patient is symptomatic, pregnant, or undergoing an invasive urologic procedure.    Susceptibility        Escherichia coli      TIO      Amoxicillin + Clavulanate Susceptible      Ampicillin Resistant      Ampicillin + Sulbactam Intermediate      Cefazolin Susceptible      Cefepime Susceptible      Ceftazidime Susceptible      Ceftriaxone Susceptible      Gentamicin Susceptible      Levofloxacin Susceptible      Nitrofurantoin Susceptible      Piperacillin + Tazobactam Susceptible      Trimethoprim + Sulfamethoxazole Susceptible                                       Assessment:    SHARON (acute kidney injury)    Acute renal failure  1-complicated urinary tract infection with pyelonephritis and probable renal abscess.  2-given her age and prior history of kidney stones other concomitant noninfectious processes such as malignancy and bladder stone need to be considered while being treated aggressively for renal abscess due to complicated pyelonephritis.  3-dementia with transient altered mental status overall improved  4-acute kidney injury likely on chronic kidney disease multifactorial clinically improving  5-hypertension  6-anemia  7-other diagnoses per primary team.     Recommendations/Discussions:  See my discussion and recommendation on 10/5/2024.    Plans for IR consult and aspiration/drainage of masslike collection in the left perinephric region discontinued by urology service.  Continue with IV Rocephin for 2 to 4 weeks and may be extended beyond that for the treatment of nephric abscess with further decision about continuation versus stopping antibiotic therapy based on the subsequent CAT scan of the abdomen and pelvis and progression of the left nephric/perinephric  abscess.  This concept of care is discussed with the patient and patient's daughter at the bedside.  Discussed with [facility] nurse Jennifer.  Patient would need weekly CBC CMP to monitor antibiotic toxicity with abnormal results to be called to Dr. Lowe [facility] [phone].  Patient's daughter is educated to call Dr. Lowe on once a week basis at [phone] to go over antibiotic toxicity and effectiveness of treatment.  Patient's daughter is educated about in person follow-up with urology and primary care provider with myself available for remote/phone visit to avoid multiple physician office visits.  Hugh Lowe MD  10/8/2024  16:30 EDT    Parts of this note may be an electronic transcription/translation of spoken language to printed text using the Dragon dictation system.

## 2024-10-08 NOTE — NURSING NOTE
PICC line nurse asked me to ask for nephrology approval for Mid line or PICC line. Dr Gregorio states patient will have to have Tamayo placed. Informed Dr Rush

## 2024-10-08 NOTE — PROGRESS NOTES
"  Infectious Diseases Progress Note    Hugh Lowe MD     [facility]  [place]: 6 days  Patient Identification:  Name: Anita Frankel  Age: 84 y.o.  Sex: female  :  1940  MRN: [id number]         Primary Care Physician: Rowan Bauer MD        Subjective: Doing well denies any specific complaints.  Interval History: See consultation note.    Objective:    Scheduled Meds:amLODIPine, 10 mg, Oral, Nightly  aspirin, 81 mg, Oral, Nightly  cefTRIAXone, 1,000 mg, Intravenous, Q24H  famotidine, 20 mg, Oral, BID  memantine, 10 mg, Oral, Q12H  metoprolol succinate XL, 25 mg, Oral, Q24H  polycarbophil, 625 mg, Oral, Daily  rivastigmine, 6 mg, Oral, BID With Meals  rosuvastatin, 10 mg, Oral, Nightly  sodium bicarbonate, 650 mg, Oral, TID      Continuous Infusions:     Vital signs in last 24 hours:  Temp:  [98.4 °F (36.9 °C)-99.7 °F (37.6 °C)] 98.6 °F (37 °C)  Heart Rate:  [84-91] 89  Resp:  [17-18] 18  BP: (128-143)/(54-64) 143/64    Intake/Output:    Intake/Output Summary (Last 24 hours) at 10/7/2024 2112  Last data filed at 10/7/2024 2000  Gross per 24 hour   Intake 990 ml   Output --   Net 990 ml       Exam:  /64 (BP Location: Right arm, Patient Position: Lying)   Pulse 89   Temp 98.6 °F (37 °C) (Oral)   Resp 18   Ht 160 cm (63\")   Wt 73.8 kg (162 lb 9.6 oz)   SpO2 96%   BMI 28.80 kg/m²   Patient is examined using the personal protective equipment as per guidelines from infection control for this particular patient as enacted.  Hand washing was performed before and after patient interaction.  General Appearance:    Alert, cooperative, no distress, AAOx3                          Head:    Normocephalic, without obvious abnormality, atraumatic                           Eyes:    PERRL, conjunctivae/corneas clear, EOM's intact, both eyes                         Throat:   Lips, tongue, gums normal; oral mucosa pink and moist                           Neck:   Supple, symmetrical, trachea " midline, no JVD                         Lungs:    Clear to auscultation bilaterally, respirations unlabored                 Chest Wall:    No tenderness or deformity                          Heart:  S1-S2 regular                  Abdomen:   Soft nontender                 Extremities:   Extremities normal, atraumatic, no cyanosis or edema                        Pulses:   Pulses palpable in all extremities                            Skin:   Skin is warm and dry,  no rashes or palpable lesions                  Neurologic: Awake interactive and pleasant       Data Review:    I reviewed the patient's new clinical results.  Results from last 7 days   Lab Units 10/07/24  0612 10/06/24  0448 10/05/24  0358 10/04/24  0448 10/03/24  0416 10/02/24  0327 10/01/24  0248   WBC 10*3/mm3 9.38 12.22* 14.91* 15.93* 13.06* 14.14* 15.88*   HEMOGLOBIN g/dL 8.1* 8.3* 9.4* 9.7* 10.0* 9.3* 9.4*   PLATELETS 10*3/mm3 309 309 385 369 306 288 270     Results from last 7 days   Lab Units 10/07/24  0612 10/06/24  0448 10/05/24  0358 10/04/24  0448 10/03/24  0416 10/02/24  0327 10/01/24  0248   SODIUM mmol/L 138 136 136 137 137 137 136   POTASSIUM mmol/L 4.0 3.8 3.8 3.5 4.0 3.9 3.6   CHLORIDE mmol/L 103 103 100 103 106 108* 104   CO2 mmol/L 23.5 22.3 23.8 20.4* 18.7* 17.0* 16.4*   BUN mg/dL 22 21 19 21 25* 32* 43*   CREATININE mg/dL 1.57* 1.70* 1.49* 1.52* 1.56* 1.65* 2.15*   CALCIUM mg/dL 9.2 9.0 9.5 9.3 9.4 9.4 9.2   GLUCOSE mg/dL 92 90 90 92 95 92 94   MRI Abdomen With & Without Contrast    Addendum Date: 10/5/2024    ADDENDUM: 10 05 24 07:22 Verify Receipt with Nurse Verified receipt with Nurse Mable WALL; report going to Dr. Rush on 10 05 07:22 (-04:00)     Result Date: 10/5/2024  Communications:  Verify Receipt with Nurse Electronically signed by Demian Larry MD on 10-05-24 at 0646    US Renal Bilateral    Result Date: 9/30/2024  Multiple left renal cysts. No solid renal mass was identified by ultrasound, suggest follow-up evaluation  with enhanced renal imaging if not contraindicated. No hydronephrosis or echogenic nephrolithiasis.   This report was finalized on 9/30/2024 5:53 PM by Dr. Otf Cardenas M.D on Workstation: [id number]      CT Abdomen Pelvis Without Contrast    Result Date: 9/30/2024   1. Nonspecific complex bilobed heterogeneous predominately low-attenuation masslike collection in the upper pole of the left kidney and extending posteriorly into the retroperitoneal fat. Findings could represent a primary renal neoplasm or possible pyelonephritis with intrarenal and perirenal abscess. Correlate with urinalysis. Could consider further evaluation with focused ultrasound or dedicated pre and postcontrast CT or MRI if clinically indicated. 2. Mild diffuse anasarca. 3. Incompletely evaluated 1.5 cm low-density lesion in the right lobe of the liver, statistically representing a hepatic cyst or hemangioma. Recommend attention on follow-up. 4. Sclerotic foci in the right T10 transverse process and right side of the sacrum, possible benign bone islands. Could consider further evaluation with routine outpatient nuclear medicine bone scan if clinically indicated. 5. Mild diffuse colonic wall thickening could be accentuated by underdistention but could reflect a nonspecific colitis.  This report was finalized on 9/30/2024 3:41 PM by Dom Dalton MD on Workstation: [id number]     Microbiology Results (last 10 days)       Procedure Component Value - Date/Time    Blood Culture - Blood, Arm, Right [[id number]]  (Normal) Collected: 09/30/24 1631    Lab Status: Final result Specimen: Blood from Arm, Right Updated: 10/05/24 1645     Blood Culture No growth at 5 days    Blood Culture - Blood, Arm, Left [[id number]]  (Normal) Collected: 09/30/24 1622    Lab Status: Final result Specimen: Blood from Arm, Left Updated: 10/05/24 1645     Blood Culture No growth at 5 days    Urine Culture - Urine, Straight Cath [[id number]]  (Abnormal)  (Susceptibility)  Collected: 09/30/24 1610    Lab Status: Final result Specimen: Urine from Straight Cath Updated: 10/04/24 0858     Urine Culture >100,000 CFU/mL Escherichia coli    Narrative:      Colonization of the urinary tract without infection is common. Treatment is discouraged unless the patient is symptomatic, pregnant, or undergoing an invasive urologic procedure.    Susceptibility        Escherichia coli      TIO      Amoxicillin + Clavulanate Susceptible      Ampicillin Resistant      Ampicillin + Sulbactam Intermediate      Cefazolin Susceptible      Cefepime Susceptible      Ceftazidime Susceptible      Ceftriaxone Susceptible      Gentamicin Susceptible      Levofloxacin Susceptible      Nitrofurantoin Susceptible      Piperacillin + Tazobactam Susceptible      Trimethoprim + Sulfamethoxazole Susceptible                                       Assessment:    SHARON (acute kidney injury)  1-complicated urinary tract infection with pyelonephritis and probable renal abscess.  2-given her age and prior history of kidney stones other concomitant noninfectious processes such as malignancy and bladder stone need to be considered while being treated aggressively for renal abscess due to complicated pyelonephritis.  3-dementia with transient altered mental status overall improved  4-acute kidney injury likely on chronic kidney disease multifactorial clinically improving  5-hypertension  6-anemia  7-other diagnoses per primary team.     Recommendations/Discussions:  See my discussion and recommendation on 10/5/2024.    Plans for IR consult and aspiration/drainage of masslike collection in the left perinephric region discontinued by urology service.  Continue with IV Rocephin.  Follow-up on blood cultures and sample aspirated from left nephric/perinephric mass drainage.  If no intervention and drainage is recommended by urology service then would recommend couple of weeks of IV Rocephin at 2 g every 24 for nephric abscess with making  sure that urology follows this patient with serial imaging to decide for intervention and to differentiate between various pathologies i.e. malignancy versus infection.  This concept of care is discussed with the patient and patient's daughter at the bedside.  Hugh Lowe MD  10/7/2024  21:12 EDT    Parts of this note may be an electronic transcription/translation of spoken language to printed text using the Dragon dictation system.

## 2024-10-08 NOTE — PLAN OF CARE
Goal Outcome Evaluation:               Patient given nursing care per MD orders and hospital policies and showed no signs or symptoms of distress this shift

## 2024-10-08 NOTE — PROGRESS NOTES
"[facility]  Cardiology Progress Note    Patient Identification:  Name: Anita Frankel  Age: 84 y.o.  Sex: female  :  1940  MRN: [id number]                 Follow Up / Chief Complaint: follow up for svt    Interval History: No cp or shob.       Objective:    Past Medical History:  Past Medical History:   Diagnosis Date    Hypertension     Kidney stone      Past Surgical History:  Past Surgical History:   Procedure Laterality Date    ABDOMINAL SURGERY      polyp removal    CHOLECYSTECTOMY      HYSTERECTOMY      TONSILLECTOMY          Social History:   Social History     Tobacco Use    Smoking status: Never     Passive exposure: Past    Smokeless tobacco: Never   Substance Use Topics    Alcohol use: Yes     Comment: very rarely      Family History:  History reviewed. No pertinent family history.       Allergies:  No Known Allergies  Scheduled Meds:  amLODIPine, 10 mg, Nightly  aspirin, 81 mg, Nightly  cefTRIAXone, 1,000 mg, Q24H  famotidine, 20 mg, BID  memantine, 10 mg, Q12H  metoprolol succinate XL, 25 mg, Q24H  polycarbophil, 625 mg, Daily  rivastigmine, 6 mg, BID With Meals  rosuvastatin, 10 mg, Nightly  sodium bicarbonate, 650 mg, TID            INTAKE AND OUTPUT:    Intake/Output Summary (Last 24 hours) at 10/8/2024 1505  Last data filed at 10/8/2024 1215  Gross per 24 hour   Intake 570 ml   Output --   Net 570 ml       ROS  Constitutional: awake and alert, no fever. No nosebleeds  Abdomen           no abdominal pain    Cardiac              no chest pain   Pulmonary         no shortness of breath    /53 (BP Location: Right arm, Patient Position: Lying)   Pulse 82   Temp 99 °F (37.2 °C) (Oral)   Resp 18   Ht 160 cm (63\")   Wt 74.9 kg (165 lb 2 oz)   SpO2 97%   BMI 29.25 kg/m²     Physical Exam:  General:  Appears in no acute distress, resting in bed  Eyes: eom normal no conjunctival drainage  HEENT:  No JVD. Thyroid not visibly enlarged. No mucosal pallor or " cyanosis  Respiratory: Respirations regular and unlabored at rest. BBS with good air entry in all fields. No crackles, rubs or wheezes auscultated  Cardiovascular: S1S2 Regular rate and rhythm. No murmur, rub or gallop. No carotid bruits. DP/PT pulses     . No pretibial pitting edema  Gastrointestinal: Abdomen soft, flat, non tender. Bowel sounds present. No hepatosplenomegaly. No ascites  Skin:   Skin warm and dry to touch. No rashes    Neuro: AAO x3 CN II-XII grossly intact  Psych: Mood and affect normal, pleasant and cooperative          I reviewed the patient's new clinical results, and personally reviewed and interpreted the patient's ECG and telemetry data from the last 24 hours        Cardiographics       ECG:      :                   Echocardiogram:        Interpretation Summary         Left ventricular systolic function is hyperdynamic (EF > 70%). Calculated left ventricular EF = 74.6%    The following left ventricular wall segments are hyperkinetic: basal anterior, basal anterolateral, basal inferolateral, basal inferior, basal inferoseptal, basal anteroseptal, mid anterior, mid anterolateral, mid inferolateral, mid inferior, mid inferoseptal, mid anteroseptal, apical anterior, apical lateral, apical inferior, apical septal and apex.    Left ventricular diastolic function was normal.    Moderate tricuspid valve regurgitation is present.    Estimated right ventricular systolic pressure from tricuspid regurgitation is mildly elevated (35-45 mmHg). Calculated right ventricular systolic pressure from tricuspid regurgitation is 43 mmHg.        Stress test pending     Imaging     Narrative & Impression   CT ABDOMEN PELVIS WO CONTRAST-     DATE OF EXAM: 9/30/2024 2:32 PM     INDICATION: Left flank pain, suspect kidney stone.     COMPARISON: Chest radiographs 8/21/2018.     TECHNIQUE: Multiple contiguous axial images were acquired through the  abdomen and pelvis without the intravenous administration of  contrast.  Reformatted coronal and sagittal sequences were also reviewed. Radiation  dose reduction techniques were utilized, including automated exposure  control and exposure modulation based on body size.     FINDINGS:  Multifocal bibasilar subsegmental atelectasis and/or scarring. Partially  imaged calcified coronary artery disease. Aortic valve calcifications.     Incompletely evaluated 1.5 cm low-density lesion in the right lobe of  the liver, statistically representing a hepatic cyst or hemangioma.  Status post cholecystectomy. The spleen is unremarkable in limited  noncontrast CT appearance. Fatty atrophy of the pancreas. Nonspecific  but possibly benign low-density nodular thickening of both adrenal  glands. Complex bilobed heterogeneous predominantly low-attenuation  masslike collection in the upper pole of the left kidney and extending  posteriorly into the retroperitoneal fat, measuring approximately 8.5 cm  x 5 cm in axial dimensions (axial series 2 image 51) and 7 cm  craniocaudal (coronal series 4 image 74). Tiny 2 mm nonobstructing  calcification in the lower pole the right kidney. The right kidney is  otherwise unremarkable in limited noncontrast CT appearance. No  obstructing renal or ureteral stone is identified. No hydronephrosis or  hydroureter. The urinary bladder is nondistended. Status post  hysterectomy. The adnexa not definitively identified.     Mild colorectal stool. Colonic diverticula, without CT evidence of  diverticulitis. Mild diffuse colonic wall thickening could be  accentuated by underdistention but could reflect a nonspecific colitis.  No bowel obstruction. The appendix is normal.     No free fluid in the abdomen or pelvis. No free intraperitoneal air. No  pathologically enlarged lymph nodes are identified, although evaluation  is mildly limited by the lack of intravenous contrast. Moderate to  severe calcified atherosclerotic disease in the abdominal aorta and its  distal  branches without aneurysm.     Mild diffuse anasarca. Rectus abdominis diastases. Anterior lower  abdominal wall surgical scar. Diffuse osteopenia. Transitional  lumbosacral vertebral anatomy with partially lumbarized S1 vertebral  segment and rudimentary S1-S2 disc space. Nonspecific sclerotic foci in  the right T10 transverse process and the right side of the sacrum at the  S4 vertebral level, possible benign bone islands. Mild to moderate  bilateral hip and SI joint DJD and mild to moderate DJD of the pubic  symphysis with chondrocalcinosis. No acute osseous abnormality is  identified.     IMPRESSION:     1. Nonspecific complex bilobed heterogeneous predominately  low-attenuation masslike collection in the upper pole of the left kidney  and extending posteriorly into the retroperitoneal fat. Findings could  represent a primary renal neoplasm or possible pyelonephritis with  intrarenal and perirenal abscess. Correlate with urinalysis. Could  consider further evaluation with focused ultrasound or dedicated pre and  postcontrast CT or MRI if clinically indicated.  2. Mild diffuse anasarca.  3. Incompletely evaluated 1.5 cm low-density lesion in the right lobe of  the liver, statistically representing a hepatic cyst or hemangioma.  Recommend attention on follow-up.  4. Sclerotic foci in the right T10 transverse process and right side of  the sacrum, possible benign bone islands. Could consider further  evaluation with routine outpatient nuclear medicine bone scan if  clinically indicated.  5. Mild diffuse colonic wall thickening could be accentuated by  underdistention but could reflect a nonspecific colitis.     This report was finalized on 9/30/2024 3:41 PM by Dom Dalton MD on  Workstation: [id number]     Lab Review   Results from last 7 days   Lab Units 10/03/24  0416 10/02/24  2035 10/02/24  1824   HSTROP T ng/L 25* 24* 24*     Results from last 7 days   Lab Units 10/08/24  0452   MAGNESIUM mg/dL 1.7  "    Results from last 7 days   Lab Units 10/08/24  0452   SODIUM mmol/L 136   POTASSIUM mmol/L 3.9   BUN mg/dL 21   CREATININE mg/dL 1.59*   CALCIUM mg/dL 9.2     @[id number]@  Results from last 7 days   Lab Units 10/08/24  0452 10/07/24  0612 10/06/24  0448   WBC 10*3/mm3 8.83 9.38 12.22*   HEMOGLOBIN g/dL 8.5* 8.1* 8.3*   HEMATOCRIT % 26.1* 24.9* 25.3*   PLATELETS 10*3/mm3 337 309 309      10/2/24             10/1/24    Interpretation Summary         Left ventricular systolic function is hyperdynamic (EF > 70%). Calculated left ventricular EF = 74.6%    The following left ventricular wall segments are hyperkinetic: basal anterior, basal anterolateral, basal inferolateral, basal inferior, basal inferoseptal, basal anteroseptal, mid anterior, mid anterolateral, mid inferolateral, mid inferior, mid inferoseptal, mid anteroseptal, apical anterior, apical lateral, apical inferior, apical septal and apex.    Left ventricular diastolic function was normal.    Moderate tricuspid valve regurgitation is present.    Estimated right ventricular systolic pressure from tricuspid regurgitation is mildly elevated (35-45 mmHg). Calculated right ventricular systolic pressure from tricuspid regurgitation is 43 mmHg.       Assessment:    SHARON on CKD: Nephrology following  Left renal cystic mass  SVT on 10/1  Dementia   hypertension   Anemia  hypomagnesia      Plan:  -SVT: Remaining in SR on the monitor. ZIO at discharge  -Blood pressure stable: Echo with EF 74.6%, several walls hypokinetic.  LV diastolic function normal.  Moderate TV regurgitation.  Mildly elevated RVSP.    We will sign off. Please call with any concerns. Okay to go home from our standpoint.  She will be discharged with a 7-day Holter monitor.  She will follow-up on October 30 at 1 PM      GLENN Goode10/8/2024         EMR Dragon/Transcription:   \"Dictated utilizing Dragon dictation\".     "

## 2024-10-09 ENCOUNTER — ANESTHESIA EVENT (OUTPATIENT)
Dept: PERIOP | Facility: HOSPITAL | Age: 84
End: 2024-10-09
Payer: MEDICARE

## 2024-10-09 ENCOUNTER — ANESTHESIA (OUTPATIENT)
Dept: PERIOP | Facility: HOSPITAL | Age: 84
End: 2024-10-09
Payer: MEDICARE

## 2024-10-09 ENCOUNTER — APPOINTMENT (OUTPATIENT)
Dept: CARDIOLOGY | Facility: HOSPITAL | Age: 84
End: 2024-10-09
Payer: MEDICARE

## 2024-10-09 ENCOUNTER — READMISSION MANAGEMENT (OUTPATIENT)
Dept: CALL CENTER | Facility: HOSPITAL | Age: 84
End: 2024-10-09
Payer: MEDICARE

## 2024-10-09 ENCOUNTER — HOME HEALTH ADMISSION (OUTPATIENT)
Dept: HOME HEALTH SERVICES | Facility: HOME HEALTHCARE | Age: 84
End: 2024-10-09
Payer: MEDICARE

## 2024-10-09 ENCOUNTER — APPOINTMENT (OUTPATIENT)
Dept: GENERAL RADIOLOGY | Facility: HOSPITAL | Age: 84
End: 2024-10-09
Payer: MEDICARE

## 2024-10-09 ENCOUNTER — TRANSCRIBE ORDERS (OUTPATIENT)
Dept: HOME HEALTH SERVICES | Facility: HOME HEALTHCARE | Age: 84
End: 2024-10-09
Payer: MEDICARE

## 2024-10-09 VITALS
WEIGHT: 162.04 LBS | HEART RATE: 86 BPM | OXYGEN SATURATION: 95 % | TEMPERATURE: 97.7 F | HEIGHT: 63 IN | BODY MASS INDEX: 28.71 KG/M2 | SYSTOLIC BLOOD PRESSURE: 143 MMHG | RESPIRATION RATE: 18 BRPM | DIASTOLIC BLOOD PRESSURE: 65 MMHG

## 2024-10-09 DIAGNOSIS — A41.9 SEPSIS WITH ACUTE ORGAN DYSFUNCTION, DUE TO UNSPECIFIED ORGANISM, UNSPECIFIED ORGAN DYSFUNCTION TYPE, UNSPECIFIED WHETHER SEPTIC SHOCK PRESENT: Primary | ICD-10-CM

## 2024-10-09 DIAGNOSIS — R65.20 SEPSIS WITH ACUTE ORGAN DYSFUNCTION, DUE TO UNSPECIFIED ORGANISM, UNSPECIFIED ORGAN DYSFUNCTION TYPE, UNSPECIFIED WHETHER SEPTIC SHOCK PRESENT: Primary | ICD-10-CM

## 2024-10-09 DIAGNOSIS — N12 PYELONEPHRITIS: ICD-10-CM

## 2024-10-09 DIAGNOSIS — N17.9 AKI (ACUTE KIDNEY INJURY): ICD-10-CM

## 2024-10-09 LAB
ALBUMIN SERPL-MCNC: 3 G/DL (ref 3.5–5.2)
ANION GAP SERPL CALCULATED.3IONS-SCNC: 10 MMOL/L (ref 5–15)
BASOPHILS # BLD AUTO: 0.04 10*3/MM3 (ref 0–0.2)
BASOPHILS NFR BLD AUTO: 0.5 % (ref 0–1.5)
BUN SERPL-MCNC: 19 MG/DL (ref 8–23)
BUN/CREAT SERPL: 13 (ref 7–25)
CALCIUM SPEC-SCNC: 9.4 MG/DL (ref 8.6–10.5)
CHLORIDE SERPL-SCNC: 104 MMOL/L (ref 98–107)
CO2 SERPL-SCNC: 24 MMOL/L (ref 22–29)
CREAT SERPL-MCNC: 1.46 MG/DL (ref 0.57–1)
DEPRECATED RDW RBC AUTO: 39.4 FL (ref 37–54)
EGFRCR SERPLBLD CKD-EPI 2021: 34.9 ML/MIN/1.73
EOSINOPHIL # BLD AUTO: 0.18 10*3/MM3 (ref 0–0.4)
EOSINOPHIL NFR BLD AUTO: 2.2 % (ref 0.3–6.2)
ERYTHROCYTE [DISTWIDTH] IN BLOOD BY AUTOMATED COUNT: 11.7 % (ref 12.3–15.4)
GLUCOSE SERPL-MCNC: 88 MG/DL (ref 65–99)
HCT VFR BLD AUTO: 26 % (ref 34–46.6)
HGB BLD-MCNC: 8.1 G/DL (ref 12–15.9)
IMM GRANULOCYTES # BLD AUTO: 0.09 10*3/MM3 (ref 0–0.05)
IMM GRANULOCYTES NFR BLD AUTO: 1.1 % (ref 0–0.5)
LYMPHOCYTES # BLD AUTO: 1.4 10*3/MM3 (ref 0.7–3.1)
LYMPHOCYTES NFR BLD AUTO: 17.3 % (ref 19.6–45.3)
MAGNESIUM SERPL-MCNC: 1.6 MG/DL (ref 1.6–2.4)
MCH RBC QN AUTO: 28.7 PG (ref 26.6–33)
MCHC RBC AUTO-ENTMCNC: 31.2 G/DL (ref 31.5–35.7)
MCV RBC AUTO: 92.2 FL (ref 79–97)
MONOCYTES # BLD AUTO: 0.89 10*3/MM3 (ref 0.1–0.9)
MONOCYTES NFR BLD AUTO: 11 % (ref 5–12)
NEUTROPHILS NFR BLD AUTO: 5.48 10*3/MM3 (ref 1.7–7)
NEUTROPHILS NFR BLD AUTO: 67.9 % (ref 42.7–76)
NRBC BLD AUTO-RTO: 0 /100 WBC (ref 0–0.2)
PHOSPHATE SERPL-MCNC: 3.6 MG/DL (ref 2.5–4.5)
PLATELET # BLD AUTO: 334 10*3/MM3 (ref 140–450)
PMV BLD AUTO: 10.6 FL (ref 6–12)
POTASSIUM SERPL-SCNC: 4.1 MMOL/L (ref 3.5–5.2)
QT INTERVAL: 320 MS
QT INTERVAL: 331 MS
QT INTERVAL: 334 MS
QTC INTERVAL: 409 MS
QTC INTERVAL: 412 MS
QTC INTERVAL: 418 MS
RBC # BLD AUTO: 2.82 10*6/MM3 (ref 3.77–5.28)
SODIUM SERPL-SCNC: 138 MMOL/L (ref 136–145)
URATE SERPL-MCNC: 8.7 MG/DL (ref 2.4–5.7)
WBC NRBC COR # BLD AUTO: 8.08 10*3/MM3 (ref 3.4–10.8)

## 2024-10-09 PROCEDURE — 25810000003 LACTATED RINGERS PER 1000 ML: Performed by: ANESTHESIOLOGY

## 2024-10-09 PROCEDURE — 83735 ASSAY OF MAGNESIUM: CPT | Performed by: INTERNAL MEDICINE

## 2024-10-09 PROCEDURE — 80069 RENAL FUNCTION PANEL: CPT | Performed by: INTERNAL MEDICINE

## 2024-10-09 PROCEDURE — 36558 INSERT TUNNELED CV CATH: CPT | Performed by: SURGERY

## 2024-10-09 PROCEDURE — 25010000002 CEFTRIAXONE PER 250 MG: Performed by: HOSPITALIST

## 2024-10-09 PROCEDURE — 93246 EXT ECG>7D<15D RECORDING: CPT

## 2024-10-09 PROCEDURE — 84550 ASSAY OF BLOOD/URIC ACID: CPT | Performed by: INTERNAL MEDICINE

## 2024-10-09 PROCEDURE — 25010000002 BUPIVACAINE (PF) 0.25 % SOLUTION 30 ML VIAL: Performed by: SURGERY

## 2024-10-09 PROCEDURE — 77001 FLUOROGUIDE FOR VEIN DEVICE: CPT | Performed by: SURGERY

## 2024-10-09 PROCEDURE — 25010000002 LIDOCAINE 1 % SOLUTION 20 ML VIAL: Performed by: SURGERY

## 2024-10-09 PROCEDURE — 25010000002 LIDOCAINE 2% SOLUTION: Performed by: ANESTHESIOLOGY

## 2024-10-09 PROCEDURE — 25010000002 HEPARIN (PORCINE) PER 1000 UNITS: Performed by: SURGERY

## 2024-10-09 PROCEDURE — 25010000002 FENTANYL CITRATE (PF) 50 MCG/ML SOLUTION: Performed by: ANESTHESIOLOGY

## 2024-10-09 PROCEDURE — 25010000002 CEFAZOLIN PER 500 MG: Performed by: SURGERY

## 2024-10-09 PROCEDURE — 02HV33Z INSERTION OF INFUSION DEVICE INTO SUPERIOR VENA CAVA, PERCUTANEOUS APPROACH: ICD-10-PCS | Performed by: SURGERY

## 2024-10-09 PROCEDURE — C1751 CATH, INF, PER/CENT/MIDLINE: HCPCS | Performed by: SURGERY

## 2024-10-09 PROCEDURE — 85025 COMPLETE CBC W/AUTO DIFF WBC: CPT | Performed by: INTERNAL MEDICINE

## 2024-10-09 PROCEDURE — C1894 INTRO/SHEATH, NON-LASER: HCPCS | Performed by: SURGERY

## 2024-10-09 PROCEDURE — 25010000002 PROPOFOL 200 MG/20ML EMULSION: Performed by: ANESTHESIOLOGY

## 2024-10-09 PROCEDURE — 76000 FLUOROSCOPY <1 HR PHYS/QHP: CPT

## 2024-10-09 DEVICE — 11F X 90CM DOUBLE LUMEN LT11F X CVC BASIC KITCVC BASIC KIT
Type: IMPLANTABLE DEVICE | Site: SUBCLAVIAN | Status: FUNCTIONAL
Brand: LONG TERM CVCLONG TERM CVC

## 2024-10-09 RX ORDER — NALOXONE HCL 0.4 MG/ML
0.2 VIAL (ML) INJECTION AS NEEDED
Status: DISCONTINUED | OUTPATIENT
Start: 2024-10-09 | End: 2024-10-09 | Stop reason: HOSPADM

## 2024-10-09 RX ORDER — HYDROCODONE BITARTRATE AND ACETAMINOPHEN 5; 325 MG/1; MG/1
1 TABLET ORAL ONCE AS NEEDED
Status: DISCONTINUED | OUTPATIENT
Start: 2024-10-09 | End: 2024-10-09 | Stop reason: HOSPADM

## 2024-10-09 RX ORDER — MIDAZOLAM HYDROCHLORIDE 1 MG/ML
0.5 INJECTION INTRAMUSCULAR; INTRAVENOUS
Status: DISCONTINUED | OUTPATIENT
Start: 2024-10-09 | End: 2024-10-09 | Stop reason: HOSPADM

## 2024-10-09 RX ORDER — HYDRALAZINE HYDROCHLORIDE 20 MG/ML
5 INJECTION INTRAMUSCULAR; INTRAVENOUS
Status: DISCONTINUED | OUTPATIENT
Start: 2024-10-09 | End: 2024-10-09 | Stop reason: HOSPADM

## 2024-10-09 RX ORDER — PROMETHAZINE HYDROCHLORIDE 25 MG/1
25 TABLET ORAL ONCE AS NEEDED
Status: DISCONTINUED | OUTPATIENT
Start: 2024-10-09 | End: 2024-10-09 | Stop reason: HOSPADM

## 2024-10-09 RX ORDER — LIDOCAINE HYDROCHLORIDE 10 MG/ML
0.5 INJECTION, SOLUTION INFILTRATION; PERINEURAL ONCE AS NEEDED
Status: DISCONTINUED | OUTPATIENT
Start: 2024-10-09 | End: 2024-10-09 | Stop reason: HOSPADM

## 2024-10-09 RX ORDER — SODIUM CHLORIDE, SODIUM LACTATE, POTASSIUM CHLORIDE, CALCIUM CHLORIDE 600; 310; 30; 20 MG/100ML; MG/100ML; MG/100ML; MG/100ML
9 INJECTION, SOLUTION INTRAVENOUS CONTINUOUS
Status: DISCONTINUED | OUTPATIENT
Start: 2024-10-09 | End: 2024-10-09

## 2024-10-09 RX ORDER — ROSUVASTATIN CALCIUM 10 MG/1
10 TABLET, COATED ORAL NIGHTLY
Qty: 30 TABLET | Refills: 0 | Status: SHIPPED | OUTPATIENT
Start: 2024-10-09 | End: 2024-11-08

## 2024-10-09 RX ORDER — DROPERIDOL 2.5 MG/ML
0.62 INJECTION, SOLUTION INTRAMUSCULAR; INTRAVENOUS
Status: DISCONTINUED | OUTPATIENT
Start: 2024-10-09 | End: 2024-10-09 | Stop reason: HOSPADM

## 2024-10-09 RX ORDER — LIDOCAINE HYDROCHLORIDE 20 MG/ML
INJECTION, SOLUTION INFILTRATION; PERINEURAL AS NEEDED
Status: DISCONTINUED | OUTPATIENT
Start: 2024-10-09 | End: 2024-10-09 | Stop reason: SURG

## 2024-10-09 RX ORDER — HYDROCODONE BITARTRATE AND ACETAMINOPHEN 7.5; 325 MG/1; MG/1
1 TABLET ORAL EVERY 4 HOURS PRN
Status: DISCONTINUED | OUTPATIENT
Start: 2024-10-09 | End: 2024-10-09 | Stop reason: HOSPADM

## 2024-10-09 RX ORDER — FENTANYL CITRATE 50 UG/ML
25 INJECTION, SOLUTION INTRAMUSCULAR; INTRAVENOUS
Status: DISCONTINUED | OUTPATIENT
Start: 2024-10-09 | End: 2024-10-09 | Stop reason: HOSPADM

## 2024-10-09 RX ORDER — DIPHENHYDRAMINE HYDROCHLORIDE 50 MG/ML
12.5 INJECTION INTRAMUSCULAR; INTRAVENOUS
Status: DISCONTINUED | OUTPATIENT
Start: 2024-10-09 | End: 2024-10-09 | Stop reason: HOSPADM

## 2024-10-09 RX ORDER — FAMOTIDINE 20 MG/1
20 TABLET, FILM COATED ORAL 2 TIMES DAILY
Qty: 60 TABLET | Refills: 0 | Status: SHIPPED | OUTPATIENT
Start: 2024-10-09 | End: 2024-11-08

## 2024-10-09 RX ORDER — LABETALOL HYDROCHLORIDE 5 MG/ML
5 INJECTION, SOLUTION INTRAVENOUS
Status: DISCONTINUED | OUTPATIENT
Start: 2024-10-09 | End: 2024-10-09 | Stop reason: HOSPADM

## 2024-10-09 RX ORDER — HEPARIN SODIUM (PORCINE) LOCK FLUSH IV SOLN 100 UNIT/ML 100 UNIT/ML
100 SOLUTION INTRAVENOUS ONCE
Status: DISCONTINUED | OUTPATIENT
Start: 2024-10-09 | End: 2024-10-09 | Stop reason: HOSPADM

## 2024-10-09 RX ORDER — FENTANYL CITRATE 50 UG/ML
INJECTION, SOLUTION INTRAMUSCULAR; INTRAVENOUS AS NEEDED
Status: DISCONTINUED | OUTPATIENT
Start: 2024-10-09 | End: 2024-10-09 | Stop reason: SURG

## 2024-10-09 RX ORDER — SODIUM BICARBONATE 650 MG/1
650 TABLET ORAL 3 TIMES DAILY
Qty: 90 TABLET | Refills: 0 | Status: SHIPPED | OUTPATIENT
Start: 2024-10-09 | End: 2024-11-08

## 2024-10-09 RX ORDER — PROMETHAZINE HYDROCHLORIDE 25 MG/1
25 SUPPOSITORY RECTAL ONCE AS NEEDED
Status: DISCONTINUED | OUTPATIENT
Start: 2024-10-09 | End: 2024-10-09 | Stop reason: HOSPADM

## 2024-10-09 RX ORDER — FLUMAZENIL 0.1 MG/ML
0.2 INJECTION INTRAVENOUS AS NEEDED
Status: DISCONTINUED | OUTPATIENT
Start: 2024-10-09 | End: 2024-10-09 | Stop reason: HOSPADM

## 2024-10-09 RX ORDER — ONDANSETRON 2 MG/ML
4 INJECTION INTRAMUSCULAR; INTRAVENOUS ONCE AS NEEDED
Status: DISCONTINUED | OUTPATIENT
Start: 2024-10-09 | End: 2024-10-09 | Stop reason: HOSPADM

## 2024-10-09 RX ORDER — FENTANYL CITRATE 50 UG/ML
50 INJECTION, SOLUTION INTRAMUSCULAR; INTRAVENOUS ONCE AS NEEDED
Status: DISCONTINUED | OUTPATIENT
Start: 2024-10-09 | End: 2024-10-09 | Stop reason: HOSPADM

## 2024-10-09 RX ORDER — SODIUM CHLORIDE 0.9 % (FLUSH) 0.9 %
3 SYRINGE (ML) INJECTION EVERY 12 HOURS SCHEDULED
Status: DISCONTINUED | OUTPATIENT
Start: 2024-10-09 | End: 2024-10-09 | Stop reason: HOSPADM

## 2024-10-09 RX ORDER — IPRATROPIUM BROMIDE AND ALBUTEROL SULFATE 2.5; .5 MG/3ML; MG/3ML
3 SOLUTION RESPIRATORY (INHALATION) ONCE AS NEEDED
Status: DISCONTINUED | OUTPATIENT
Start: 2024-10-09 | End: 2024-10-09 | Stop reason: HOSPADM

## 2024-10-09 RX ORDER — PROPOFOL 10 MG/ML
INJECTION, EMULSION INTRAVENOUS AS NEEDED
Status: DISCONTINUED | OUTPATIENT
Start: 2024-10-09 | End: 2024-10-09 | Stop reason: SURG

## 2024-10-09 RX ORDER — SODIUM CHLORIDE 0.9 % (FLUSH) 0.9 %
3-10 SYRINGE (ML) INJECTION AS NEEDED
Status: DISCONTINUED | OUTPATIENT
Start: 2024-10-09 | End: 2024-10-09 | Stop reason: HOSPADM

## 2024-10-09 RX ORDER — HYDROMORPHONE HYDROCHLORIDE 1 MG/ML
0.25 INJECTION, SOLUTION INTRAMUSCULAR; INTRAVENOUS; SUBCUTANEOUS
Status: DISCONTINUED | OUTPATIENT
Start: 2024-10-09 | End: 2024-10-09 | Stop reason: HOSPADM

## 2024-10-09 RX ORDER — METOPROLOL SUCCINATE 25 MG/1
25 TABLET, EXTENDED RELEASE ORAL
Qty: 30 TABLET | Refills: 0 | Status: SHIPPED | OUTPATIENT
Start: 2024-10-10 | End: 2024-11-09

## 2024-10-09 RX ORDER — EPHEDRINE SULFATE 50 MG/ML
5 INJECTION, SOLUTION INTRAVENOUS ONCE AS NEEDED
Status: DISCONTINUED | OUTPATIENT
Start: 2024-10-09 | End: 2024-10-09 | Stop reason: HOSPADM

## 2024-10-09 RX ORDER — HEPARIN SODIUM 1000 [USP'U]/ML
INJECTION, SOLUTION INTRAVENOUS; SUBCUTANEOUS AS NEEDED
Status: DISCONTINUED | OUTPATIENT
Start: 2024-10-09 | End: 2024-10-09 | Stop reason: HOSPADM

## 2024-10-09 RX ADMIN — CEFTRIAXONE 1000 MG: 1 INJECTION, POWDER, FOR SOLUTION INTRAMUSCULAR; INTRAVENOUS at 16:16

## 2024-10-09 RX ADMIN — SODIUM CHLORIDE, POTASSIUM CHLORIDE, SODIUM LACTATE AND CALCIUM CHLORIDE: 600; 310; 30; 20 INJECTION, SOLUTION INTRAVENOUS at 08:55

## 2024-10-09 RX ADMIN — PROPOFOL 50 MG: 10 INJECTION, EMULSION INTRAVENOUS at 09:02

## 2024-10-09 RX ADMIN — SODIUM CHLORIDE 2000 MG: 900 INJECTION INTRAVENOUS at 09:25

## 2024-10-09 RX ADMIN — FENTANYL CITRATE 25 MCG: 50 INJECTION, SOLUTION INTRAMUSCULAR; INTRAVENOUS at 09:10

## 2024-10-09 RX ADMIN — LIDOCAINE HYDROCHLORIDE 50 MG: 20 INJECTION, SOLUTION INFILTRATION; PERINEURAL at 09:00

## 2024-10-09 RX ADMIN — FAMOTIDINE 20 MG: 20 TABLET, FILM COATED ORAL at 12:16

## 2024-10-09 RX ADMIN — FENTANYL CITRATE 25 MCG: 50 INJECTION, SOLUTION INTRAMUSCULAR; INTRAVENOUS at 09:15

## 2024-10-09 RX ADMIN — PROPOFOL 50 MCG/KG/MIN: 10 INJECTION, EMULSION INTRAVENOUS at 09:06

## 2024-10-09 RX ADMIN — METOPROLOL SUCCINATE 25 MG: 25 TABLET, EXTENDED RELEASE ORAL at 12:16

## 2024-10-09 NOTE — PLAN OF CARE
Goal Outcome Evaluation:  Plan of Care Reviewed With: patient, daughter           Goals met pt to dc

## 2024-10-09 NOTE — PROGRESS NOTES
"Daily progress note    Primary care physician  Dr. Bauer    Subjective  Status post Tamayo catheter placement.  Patient awake and alert feeling better and wants to go home.  Patient family at bedside.    History of present illness  84-year-old female with history of hypertension and dementia who lives by herself presented to [facility] emergency room with left flank pain for last few days which is getting worse.  Patient also complained of nausea but no vomiting diarrhea.  Patient also denies any fever chills chest pain shortness of breath palpitation.  Patient workup in ER revealed acute kidney injury and left cystic renal mass admitted for management.      REVIEW OF SYSTEMS  Unremarkable except generalized weakness     PHYSICAL EXAM   Blood pressure 143/65, pulse 86, temperature 97.7 °F (36.5 °C), temperature source Oral, resp. rate 18, height 160 cm (63\"), weight 73.5 kg (162 lb 0.6 oz), SpO2 95%.    GENERAL: Alert well-appearing female no obvious distress.   SKIN: Warm, dry  HENT: Normocephalic, atraumatic  EYES: no scleral icterus  CV: regular rhythm, regular rate  RESPIRATORY: normal effort, moving air bilaterally  ABDOMEN: soft, nontender, nondistended bowel sounds positive  MUSCULOSKELETAL: no deformity  NEURO: alert, moves all extremities, follows commands     LAB RESULTS  Lab Results (last 24 hours)       Procedure Component Value Units Date/Time    Renal Function Panel [[id number]]  (Abnormal) Collected: 10/09/24 0434    Specimen: Blood Updated: 10/09/24 0525     Glucose 88 mg/dL      BUN 19 mg/dL      Creatinine 1.46 mg/dL      Sodium 138 mmol/L      Potassium 4.1 mmol/L      Chloride 104 mmol/L      CO2 24.0 mmol/L      Calcium 9.4 mg/dL      Albumin 3.0 g/dL      Phosphorus 3.6 mg/dL      Anion Gap 10.0 mmol/L      BUN/Creatinine Ratio 13.0     eGFR 34.9 mL/min/1.73     Narrative:      GFR Normal >60  Chronic Kidney Disease <60  Kidney Failure <15    The GFR formula is only valid for " adults with stable renal function between ages 18 and 70.    Uric Acid [[id number]]  (Abnormal) Collected: 10/09/24 0434    Specimen: Blood Updated: 10/09/24 0525     Uric Acid 8.7 mg/dL     Magnesium [[id number]]  (Normal) Collected: 10/09/24 0434    Specimen: Blood Updated: 10/09/24 0525     Magnesium 1.6 mg/dL     CBC & Differential [[id number]]  (Abnormal) Collected: 10/09/24 0434    Specimen: Blood Updated: 10/09/24 0502    Narrative:      The following orders were created for panel order CBC & Differential.  Procedure                               Abnormality         Status                     ---------                               -----------         ------                     CBC Auto Differential[[id number]]        Abnormal            Final result                 Please view results for these tests on the individual orders.    CBC Auto Differential [[id number]]  (Abnormal) Collected: 10/09/24 0434    Specimen: Blood Updated: 10/09/24 0502     WBC 8.08 10*3/mm3      RBC 2.82 10*6/mm3      Hemoglobin 8.1 g/dL      Hematocrit 26.0 %      MCV 92.2 fL      MCH 28.7 pg      MCHC 31.2 g/dL      RDW 11.7 %      RDW-SD 39.4 fl      MPV 10.6 fL      Platelets 334 10*3/mm3      Neutrophil % 67.9 %      Lymphocyte % 17.3 %      Monocyte % 11.0 %      Eosinophil % 2.2 %      Basophil % 0.5 %      Immature Grans % 1.1 %      Neutrophils, Absolute 5.48 10*3/mm3      Lymphocytes, Absolute 1.40 10*3/mm3      Monocytes, Absolute 0.89 10*3/mm3      Eosinophils, Absolute 0.18 10*3/mm3      Basophils, Absolute 0.04 10*3/mm3      Immature Grans, Absolute 0.09 10*3/mm3      nRBC 0.0 /100 WBC           Imaging Results (Last 24 Hours)       Procedure Component Value Units Date/Time    XR Chest Post CVA Port [[id number]] Collected: 10/09/24 1028     Updated: 10/09/24 1033    Narrative:      XR CHEST POST CVA PORT-     Clinical: Newly placed Tamayo catheter     COMPARISON examination dated 8/21/2018     FINDINGS: Central venous catheter  tip superimposes the superior vena  cava, right IJ approach. No pneumothorax.     Cardiac enlargement. Prominence of the right and left central  vasculature, possible pulmonary arterial hypertension. Unchanged.     The right lung is clear. There is a linear area of likely atelectasis  demonstrated at the left lung base. No pulmonary edema is demonstrated.  Remainder is unremarkable.     This report was finalized on 10/9/2024 10:30 AM by Dr. James Sclaes M.D on Workstation: [id number]       FL C Arm During Surgery [[id number]] Resulted: 10/09/24 0950     Updated: 10/09/24 0950    Narrative:      This procedure was auto-finalized with no dictation required.            Current Facility-Administered Medications:     amLODIPine (NORVASC) tablet 10 mg, 10 mg, Oral, Nightly, Chu Bryant MD, 10 mg at 10/08/24 2053    aspirin EC tablet 81 mg, 81 mg, Oral, Nightly, Chu Bryant MD, 81 mg at 10/08/24 2053    cefTRIAXone (ROCEPHIN) 1,000 mg in sodium chloride 0.9 % 100 mL MBP, 1,000 mg, Intravenous, Q24H, Chu Bryant MD, Last Rate: 200 mL/hr at 10/08/24 1634, 1,000 mg at 10/08/24 1634    famotidine (PEPCID) tablet 20 mg, 20 mg, Oral, BID, Chu Bryant MD, 20 mg at 10/09/24 1216    memantine (NAMENDA) tablet 10 mg, 10 mg, Oral, Q12H, Chu Bryant MD, 10 mg at 10/08/24 2053    metoprolol succinate XL (TOPROL-XL) 24 hr tablet 25 mg, 25 mg, Oral, Q24H, Chu Bryant MD, 25 mg at 10/09/24 1216    polycarbophil tablet 625 mg, 625 mg, Oral, Daily, Chu Bryant MD, 625 mg at 10/08/24 0849    polyethylene glycol (MIRALAX) packet 17 g, 17 g, Oral, BID PRN, Chu Bryant MD, 17 g at 10/06/24 1730    rivastigmine (EXELON) capsule 6 mg, 6 mg, Oral, BID With Meals, Chu Bryant MD, 6 mg at 10/08/24 1809    rosuvastatin (CRESTOR) tablet 10 mg, 10 mg, Oral, Nightly, Chu Bryant MD, 10 mg at 10/08/24 2053    sodium bicarbonate tablet 650 mg, 650 mg, Oral, TID, Chu Bryant MD, 650 mg  at 10/08/24 2053    [COMPLETED] Insert Peripheral IV, , , Once **AND** sodium chloride 0.9 % flush 10 mL, 10 mL, Intravenous, PRN, Chu Bryant MD    temazepam (RESTORIL) capsule 30 mg, 30 mg, Oral, Nightly PRN, Chu Bryant MD, 30 mg at 10/03/24 2040     ASSESSMENT  Acute kidney injury resolving  Left renal mass suspicious for renal abscess  Supraventricular tachycardia resolved  Acute E. coli UTI resolved  Status post dual-lumen Tamayo catheter placement  Hypertension  Dementia    PLAN  Discharge home on IV antibiotics with family support and home health  Discharge summary dictated    LATONIA VELASQUEZ MD    Copied text in this note has been reviewed and is accurate as of 10/09/24

## 2024-10-09 NOTE — ANESTHESIA POSTPROCEDURE EVALUATION
Patient: Anita Frankel    Procedure Summary       Date: 10/09/24 Room / Location: [facility] OR  / [facility] MAIN OR    Anesthesia Start: 0855 Anesthesia Stop: 1005    Procedure: INSERTION OF HOOVER CATHETER Diagnosis:       Acute renal failure, unspecified acute renal failure type      (Acute renal failure, unspecified acute renal failure type [N17.9])    Surgeons: Chu Bryant MD Provider: Francisco J Bains MD    Anesthesia Type: MAC ASA Status: 3            Anesthesia Type: MAC    Vitals  Vitals Value Taken Time   /62 10/09/24 1045   Temp 36.9 °C (98.4 °F) 10/09/24 1010   Pulse 84 10/09/24 1051   Resp 16 10/09/24 1030   SpO2 94 % 10/09/24 1051   Vitals shown include unfiled device data.        Anesthesia Post Evaluation

## 2024-10-09 NOTE — DISCHARGE INSTRUCTIONS
Patient's daughter is educated to call Dr. Lowe on once a week basis at [phone] to go over antibiotic toxicity and effectiveness of treatment.

## 2024-10-09 NOTE — SIGNIFICANT NOTE
10/09/24 1424   OTHER   Discipline physical therapist   Rehab Time/Intention   Session Not Performed patient unavailable for treatment  (OR in AM, then off floor to cardio in PM. Will follow up tomorrow.)   Recommendation   PT - Next Appointment 10/10/24

## 2024-10-09 NOTE — PROGRESS NOTES
[facility] Progress Note      Patient Name: Anita Frankel  : 1940  MRN: [id number]  Primary Care Physician:  Rowan Bauer MD  Date of admission: 2024    Subjective     Interval History:   Follow-up acute kidney injury on chronic kidney disease.    Patient had a orion catheter placed today, seen and examined in the PACU.  Patient is doing well, denies any chest pain, SOA, no nausea or vomiting.     Review of Systems:   As noted above    Objective     Vitals:   Temp:  [98.4 °F (36.9 °C)-100.4 °F (38 °C)] 98.4 °F (36.9 °C)  Heart Rate:  [74-86] 77  Resp:  [16-20] 16  BP: (125-142)/(53-90) 136/63  Flow (L/min):  [1-3] 3    Intake/Output Summary (Last 24 hours) at 10/9/2024 1111  Last data filed at 10/9/2024 0954  Gross per 24 hour   Intake 670 ml   Output --   Net 670 ml       Physical Exam:    General Appearance: alert, awake, chronically, no acute distress   Skin: warm and dry  HEENT: oral mucosa normal, nonicteric sclera  Neck: No JVD.  Double-lumen Tamayo catheter in place in the right IJ  Lungs: CTA, unlabored breathing effort  Heart: RRR, no rub  Abdomen: soft, nontender, nondistended  : no palpable bladder  Extremities: Trace RLE edema, no cyanosis or clubbing  Neuro: Moving all extremities.    Scheduled Meds:     amLODIPine, 10 mg, Oral, Nightly  [Transfer Hold] aspirin, 81 mg, Oral, Nightly  cefTRIAXone, 1,000 mg, Intravenous, Q24H  famotidine, 20 mg, Oral, BID  [Transfer Hold] memantine, 10 mg, Oral, Q12H  metoprolol succinate XL, 25 mg, Oral, Q24H  [Transfer Hold] polycarbophil, 625 mg, Oral, Daily  [Transfer Hold] rivastigmine, 6 mg, Oral, BID With Meals  [Transfer Hold] rosuvastatin, 10 mg, Oral, Nightly  [Transfer Hold] sodium bicarbonate, 650 mg, Oral, TID      IV Meds:   lactated ringers, 9 mL/hr, Last Rate: 9 mL/hr (10/09/24 0855)        Results Reviewed:   I have personally reviewed the results from the time of this admission to 10/9/2024  11:11 EDT     Results from last 7 days   Lab Units 10/09/24  0434 10/08/24  0452 10/07/24  0612   SODIUM mmol/L 138 136 138   POTASSIUM mmol/L 4.1 3.9 4.0   CHLORIDE mmol/L 104 102 103   CO2 mmol/L 24.0 24.4 23.5   BUN mg/dL 19 21 22   CREATININE mg/dL 1.46* 1.59* 1.57*   CALCIUM mg/dL 9.4 9.2 9.2   GLUCOSE mg/dL 88 93 92       Estimated Creatinine Clearance: 26.5 mL/min (A) (by C-G formula based on SCr of 1.46 mg/dL (H)).    Results from last 7 days   Lab Units 10/09/24  0434 10/08/24  0452 10/05/24  0358   MAGNESIUM mg/dL 1.6 1.7 1.6   PHOSPHORUS mg/dL 3.6 3.4 3.3       Results from last 7 days   Lab Units 10/09/24  0434 10/08/24  0452 10/03/24  0416   URIC ACID mg/dL 8.7* 8.5* 9.1*       Results from last 7 days   Lab Units 10/09/24  0434 10/08/24  0452 10/07/24  0612 10/06/24  0448 10/05/24  0358   WBC 10*3/mm3 8.08 8.83 9.38 12.22* 14.91*   HEMOGLOBIN g/dL 8.1* 8.5* 8.1* 8.3* 9.4*   PLATELETS 10*3/mm3 334 337 309 309 385             Assessment / Plan     ASSESSMENT:  Acute kidney injury on chronic kidney disease, associated with poor oral intake and limited autoregulation because of ARB.  The renal function is very stable, the creatinine today 1.46, electrolyte within acceptable range and the patient appears to be euvolemic, uric acid is 8.7, stable  Left cystic renal mass followed by   UTI, treated with improved leukocytosis  History of dementia  Hypertension with chronic kidney disease, well-controlled  SVT was controlled rate at this time  Anemia with chronic kidney disease, hemoglobin 8.1 will monitor.  Will not give CLAUDIA until the infection is cleared.      PLAN:  Continue the same treatment  Ok to be discharged from nephrology standpoint anytime, will need follow-up in our office, also she would need outpatient  follow-up as well  Surveillance labs      I reviewed the chart and other providers notes, reviewed labs.  I discussed the case with the patient.  Copied text in this note has been reviewed and  is accurate as of 10/09/24.       Thank you for involving us in the care of Anita Frankel.  Please feel free to call with any questions.    Harish Gregorio MD  10/09/24  11:11 EDT    [facility]  [phone]    Please note that portions of this note were completed with a voice recognition program.

## 2024-10-09 NOTE — OP NOTE
OPERATIVE REPORT     DATE OF OPERATION: 10/09/24     SURGEON: Chu Bryant MD    PREOPERATIVE DIAGNOSIS: Need for venous access, perinephric abscess    POSTOPERATIVE DIAGNOSIS: Same    PROCEDURE PERFORMED: Insertion of dual-lumen Tamayo catheter (11 Nigerian)    ANESTHESIA: General    SPECIMEN: None    DRAINS: None    BLOOD LOSS: 20 cc    INDICATIONS FOR OPERATION: Miss Anita Frankel is a 84 y.o.   lady in need of durable venous access.  She has history of chronic kidney disease.  Tamayo catheter was requested for IV antibiotics..  I discussed the risks, benefits, and alternatives to Tamayo catheter placement with her and her daughter.  Informed consent was obtained..    OPERATIVE REPORT: The patient was taken to the operating room, transferred onto the operating room table, and underwent monitored anesthesia without incident. The patient was prepped and draped in the usual sterile fashion.  Preoperative antibiotics were given, and a timeout was performed.      The right internal jugular vein was accessed with an 18-gauge needle under ultrasound guidance, and a guidewire was inserted into the superior vena cava and confirmed with fluoroscopy.  Skin incision was made on the right chest wall.  The catheter was then tunneled from the right chest wall to the incision made on the neck.   The vein dilator and sheath were introduced, and the dilator and guidewire were removed.  The port tubing was laid over the patient and trimmed at the level of the third rib space.  It was then inserted and tip of the catheter was confirmed with fluoroscopic imaging.  Skin of the neck was closed with interrupted 3-0 Vicryl suture.  The catheter was secured to the chest wall with 3-0 nylon.  Biopatch and sterile dressing was placed over the insertion site of the catheter.  The patient tolerated the procedure well, and was transferred to the recovery area in stable condition. Recovery room CXR was ordered to confirm placement.    Chu  ASHUTOSH Bryant.  General and Endoscopic Surgery  [facility]    [address], [address]  [place], [place], [address]  P: [phone]  F: [phone]               Chu Bryant M.D.  General and Endoscopic Surgery  [facility]    [address], [address]  [place], [place], [address]  P: [phone]  F: [phone]

## 2024-10-09 NOTE — ANESTHESIA PREPROCEDURE EVALUATION
Anesthesia Evaluation     Patient summary reviewed and Nursing notes reviewed   NPO Solid Status: > 8 hours  NPO Liquid Status: > 2 hours           Airway   Mallampati: II  TM distance: >3 FB  Neck ROM: full  Dental - normal exam     Pulmonary - negative pulmonary ROS   (-) decreased breath sounds, wheezes  Cardiovascular - normal exam  Exercise tolerance: good (4-7 METS)    (+) hypertension      Neuro/Psych- negative ROS  (-) seizures, CVA  GI/Hepatic/Renal/Endo    (+) renal disease-  (-) diabetes    Musculoskeletal (-) negative ROS    Abdominal    Substance History - negative use  (-) alcohol use, drug use     OB/GYN negative ob/gyn ROS         Other - negative ROS                         Anesthesia Plan    ASA 3     MAC     (LR was hanging, K 4.1, said it was OK to continue but be cognizant of fluid use)  intravenous induction     Anesthetic plan, risks, benefits, and alternatives have been provided, discussed and informed consent has been obtained with: patient.        CODE STATUS:    Medical Intervention Limits: No cardioversion; No intubation (DNI)  Code Status (Patient has no pulse and is not breathing): No CPR (Do Not Attempt to Resuscitate)  Medical Interventions (Patient has pulse or is breathing): Limited Support

## 2024-10-09 NOTE — DISCHARGE SUMMARY
Discharge summary    Date of admission 9/30/2024  Date of discharge 10/9/2024    Final diagnosis  Acute kidney injury resolved  Left renal mass probable renal abscess  Supraventricular tachycardia resolved  Acute E. coli UTI resolved  Status post dual-lumen Tamayo catheter placement  Hypertension  Dementia    Discharge medications    Current Facility-Administered Medications:     amLODIPine (NORVASC) tablet 10 mg, 10 mg, Oral, Nightly, Chu Bryant MD, 10 mg at 10/08/24 2053    aspirin EC tablet 81 mg, 81 mg, Oral, Nightly, Chu Bryant MD, 81 mg at 10/08/24 2053    cefTRIAXone (ROCEPHIN) 1,000 mg in sodium chloride 0.9 % 100 mL MBP, 1,000 mg, Intravenous, Q24H, Roger Rush MD    famotidine (PEPCID) tablet 20 mg, 20 mg, Oral, BID, Chu Bryant MD, 20 mg at 10/09/24 1216    memantine (NAMENDA) tablet 10 mg, 10 mg, Oral, Q12H, Chu Bryant MD, 10 mg at 10/08/24 2053    metoprolol succinate XL (TOPROL-XL) 24 hr tablet 25 mg, 25 mg, Oral, Q24H, Chu Bryant MD, 25 mg at 10/09/24 1216    polycarbophil tablet 625 mg, 625 mg, Oral, Daily, Chu Bryant MD, 625 mg at 10/08/24 0849    rivastigmine (EXELON) capsule 6 mg, 6 mg, Oral, BID With Meals, Chu Bryant MD, 6 mg at 10/08/24 1809    rosuvastatin (CRESTOR) tablet 10 mg, 10 mg, Oral, Nightly, Chu Bryant MD, 10 mg at 10/08/24 2053    sodium bicarbonate tablet 650 mg, 650 mg, Oral, TID, Chu Bryant MD, 650 mg at 10/08/24 2053    [COMPLETED] Insert Peripheral IV, , , Once **AND** sodium chloride 0.9 % flush 10 mL, 10 mL, Intravenous, PRN, Chu Bryant MD     Consults obtained  Urology  Nephrology  Cardiology  Infectious disease  General Surgery    Procedures  Insertion of dual-lumen Tamayo catheter    Hospital course  84-year-old female with history of hypertension osteoarthritis and dementia otherwise in good health admitted through emergency room with left flank pain.  Patient workup in ER revealed acute UTI with  acute kidney injury and also found to have left renal mass.  Patient admitted treated with IV fluid IV antibiotics and discontinue Avapro and followed by nephrology and urology.  Patient kidney function returned to baseline and her culture showed E. coli and her left renal mass further evaluated consistent with probable renal abscess but urology decided to treat conservatively with IV antibiotics and no surgical intervention at this time.  Patient improved and infectious disease recommend 4 weeks of IV antibiotics which can be finished at home.  Patient is a poor candidate for PICC line or midline per nephrology and Tamayo placed to complete IV antibiotics at home with home health services.  Patient remained DNR throughout hospital course.  Patient also followed by cardiology during hospitalization for SVT which resolved.    Discharge diet regular     Activity as tolerated    Medication as above    Follow-up with primary doctor in 1 week and follow-up with nephrology urology and infectious disease per the instructions and take medication as directed.    LATONIA VELASQUEZ MD

## 2024-10-09 NOTE — PLAN OF CARE
Goal Outcome Evaluation:  Plan of Care Reviewed With: patient, daughter        Progress: improving          NPO since midnight for mccormick placement for home antibiotic. Consent in chart.

## 2024-10-10 ENCOUNTER — HOME CARE VISIT (OUTPATIENT)
Dept: HOME HEALTH SERVICES | Facility: HOME HEALTHCARE | Age: 84
End: 2024-10-10
Payer: MEDICARE

## 2024-10-10 PROCEDURE — G0299 HHS/HOSPICE OF RN EA 15 MIN: HCPCS

## 2024-10-10 NOTE — OUTREACH NOTE
Prep Survey      Flowsheet Row Responses   [facility] facility patient discharged from? [place]   Is LACE score < 7 ? No   Eligibility Readm Mgmt   Discharge diagnosis Acute kidney injury resolved  Left renal mass probable renal abscess   Does the patient have one of the following disease processes/diagnoses(primary or secondary)? Other   Does the patient have Home health ordered? Yes   What is the Home health agency?  [facility]   Is there a DME ordered? No   Prep survey completed? Yes            NITESH MONTAÑO - Registered Nurse

## 2024-10-14 ENCOUNTER — READMISSION MANAGEMENT (OUTPATIENT)
Dept: CALL CENTER | Facility: HOSPITAL | Age: 84
End: 2024-10-14
Payer: MEDICARE

## 2024-10-14 ENCOUNTER — HOME CARE VISIT (OUTPATIENT)
Dept: HOME HEALTH SERVICES | Facility: HOME HEALTHCARE | Age: 84
End: 2024-10-14
Payer: MEDICARE

## 2024-10-14 ENCOUNTER — LAB REQUISITION (OUTPATIENT)
Dept: LAB | Facility: HOSPITAL | Age: 84
End: 2024-10-14
Payer: MEDICARE

## 2024-10-14 VITALS
TEMPERATURE: 97.4 F | SYSTOLIC BLOOD PRESSURE: 116 MMHG | BODY MASS INDEX: 27.83 KG/M2 | OXYGEN SATURATION: 97 % | HEIGHT: 64 IN | DIASTOLIC BLOOD PRESSURE: 60 MMHG | RESPIRATION RATE: 18 BRPM | HEART RATE: 82 BPM | WEIGHT: 163 LBS

## 2024-10-14 VITALS
HEART RATE: 71 BPM | DIASTOLIC BLOOD PRESSURE: 68 MMHG | SYSTOLIC BLOOD PRESSURE: 132 MMHG | RESPIRATION RATE: 18 BRPM | OXYGEN SATURATION: 97 %

## 2024-10-14 DIAGNOSIS — Z45.2 ENCOUNTER FOR ADJUSTMENT AND MANAGEMENT OF VASCULAR ACCESS DEVICE: ICD-10-CM

## 2024-10-14 LAB
ALBUMIN SERPL-MCNC: 3.5 G/DL (ref 3.5–5.2)
ALBUMIN/GLOB SERPL: 1 G/DL
ALP SERPL-CCNC: 106 U/L (ref 39–117)
ALT SERPL W P-5'-P-CCNC: 12 U/L (ref 1–33)
ANION GAP SERPL CALCULATED.3IONS-SCNC: 12.1 MMOL/L (ref 5–15)
AST SERPL-CCNC: 18 U/L (ref 1–32)
BILIRUB SERPL-MCNC: 0.2 MG/DL (ref 0–1.2)
BUN SERPL-MCNC: 18 MG/DL (ref 8–23)
BUN/CREAT SERPL: 10.9 (ref 7–25)
CALCIUM SPEC-SCNC: 9.6 MG/DL (ref 8.6–10.5)
CHLORIDE SERPL-SCNC: 103 MMOL/L (ref 98–107)
CO2 SERPL-SCNC: 26.9 MMOL/L (ref 22–29)
CREAT SERPL-MCNC: 1.65 MG/DL (ref 0.57–1)
DEPRECATED RDW RBC AUTO: 39 FL (ref 37–54)
EGFRCR SERPLBLD CKD-EPI 2021: 30.1 ML/MIN/1.73
ERYTHROCYTE [DISTWIDTH] IN BLOOD BY AUTOMATED COUNT: 11.8 % (ref 12.3–15.4)
GLOBULIN UR ELPH-MCNC: 3.4 GM/DL
GLUCOSE SERPL-MCNC: 116 MG/DL (ref 65–99)
HCT VFR BLD AUTO: 28.8 % (ref 34–46.6)
HGB BLD-MCNC: 9.3 G/DL (ref 12–15.9)
MCH RBC QN AUTO: 29.5 PG (ref 26.6–33)
MCHC RBC AUTO-ENTMCNC: 32.3 G/DL (ref 31.5–35.7)
MCV RBC AUTO: 91.4 FL (ref 79–97)
PLATELET # BLD AUTO: 287 10*3/MM3 (ref 140–450)
PMV BLD AUTO: 12 FL (ref 6–12)
POTASSIUM SERPL-SCNC: 4.1 MMOL/L (ref 3.5–5.2)
PROT SERPL-MCNC: 6.9 G/DL (ref 6–8.5)
RBC # BLD AUTO: 3.15 10*6/MM3 (ref 3.77–5.28)
SODIUM SERPL-SCNC: 142 MMOL/L (ref 136–145)
WBC NRBC COR # BLD AUTO: 8.71 10*3/MM3 (ref 3.4–10.8)

## 2024-10-14 PROCEDURE — G0299 HHS/HOSPICE OF RN EA 15 MIN: HCPCS

## 2024-10-14 PROCEDURE — 85027 COMPLETE CBC AUTOMATED: CPT | Performed by: INTERNAL MEDICINE

## 2024-10-14 PROCEDURE — 80053 COMPREHEN METABOLIC PANEL: CPT | Performed by: INTERNAL MEDICINE

## 2024-10-14 NOTE — OUTREACH NOTE
Medical Week 1 Survey      Flowsheet Row Responses   [facility] patient discharged from? [place]   Does the patient have one of the following disease processes/diagnoses(primary or secondary)? Other   Week 1 attempt successful? Yes   Call start time 1202   Call end time 1212   Discharge diagnosis Acute kidney injury resolved  Left renal mass probable renal abscess   Is patient permission given to speak with other caregiver? Yes   List who call center can speak with Dtr- Rhona   Person spoke with today (if not patient) and relationship Rhona   Meds reviewed with patient/caregiver? Yes   Is the patient having any side effects they believe may be caused by any medication additions or changes? No   Does the patient have all medications ordered at discharge? Yes   Is the patient taking all medications as directed (includes completed medication regime)? Yes   Does the patient have a primary care provider?  Yes   Does the patient have an appointment with their PCP within 7 days of discharge? Yes   What is the Home health agency?  [facility]   Has home health visited the patient within 72 hours of discharge? Yes   Comments Pt's dtr reports that she is managing the pt's infusion through the Tamayo catheter, homehealth will manage dressing changes. Pt is tolerating po intake WNL and voiding without issue.   Did the patient receive a copy of their discharge instructions? Yes   Nursing interventions Reviewed instructions with patient   What is the patient's perception of their health status since discharge? Improving   Is the patient/caregiver able to teach back signs and symptoms related to disease process for when to call PCP? Yes   Is the patient/caregiver able to teach back signs and symptoms related to disease process for when to call 911? Yes   Is the patient/caregiver able to teach back the hierarchy of who to call/visit for symptoms/problems? PCP, Specialist, Home health  nurse, Urgent Care, ED, 911 Yes   Week 1 call completed? Yes   Revoked No further contact(revokes)-requires comment   Call end time 1212            Valerie JOHNSON - Registered Nurse

## 2024-10-15 NOTE — CASE MANAGEMENT/SOCIAL WORK
Case Management Discharge Note      Final Note: Home with [facility] and [facility] with family.    Provided Post Acute Provider List?: N/A  N/A Provider List Comment: Denies need  Provided Post Acute Provider Quality & Resource List?: N/A  N/A Quality & Resource List Comment: Denies need    Selected Continued Care - Discharged on 10/9/2024 Admission date: 9/30/2024 - Discharge disposition: Home or Self Care      Destination    No services have been selected for the patient.                Durable Medical Equipment    No services have been selected for the patient.                Dialysis/Infusion    No services have been selected for the patient.                Home Medical Care    No services have been selected for the patient.                Therapy    No services have been selected for the patient.                Community Resources    No services have been selected for the patient.                Community & DME    No services have been selected for the patient.                         Final Discharge Disposition Code: 06 - home with home health care

## 2024-10-16 ENCOUNTER — HOME CARE VISIT (OUTPATIENT)
Dept: HOME HEALTH SERVICES | Facility: HOME HEALTHCARE | Age: 84
End: 2024-10-16
Payer: MEDICARE

## 2024-10-16 VITALS
RESPIRATION RATE: 18 BRPM | DIASTOLIC BLOOD PRESSURE: 82 MMHG | SYSTOLIC BLOOD PRESSURE: 126 MMHG | HEART RATE: 93 BPM | OXYGEN SATURATION: 98 % | TEMPERATURE: 97.7 F

## 2024-10-16 PROCEDURE — G0299 HHS/HOSPICE OF RN EA 15 MIN: HCPCS

## 2024-10-16 NOTE — HOME HEALTH
Central line Dressing changed with sterile technique, but was not able to change out caps as they have not arrived yet from pharmacy, will change on Wednesday.  CP assessment WNL.  Patient does have some RLE swelling, does appear very much like gout, negative for signs of DVT other than unilateral edema, daughter already has call out to PCP, will follow up on this at next visit.

## 2024-10-18 ENCOUNTER — HOME CARE VISIT (OUTPATIENT)
Dept: HOME HEALTH SERVICES | Facility: HOME HEALTHCARE | Age: 84
End: 2024-10-18
Payer: MEDICARE

## 2024-10-18 NOTE — HOME HEALTH
swelling in RLE improved dramatically with voltaren, ice and elevation, PCP also encourged compression use per daughter  CP assessment WNL  Changed end caps on central line, danita did deliver supplies  Will back patient down to weekly visits now, daughter and patient in agreement

## 2024-10-21 ENCOUNTER — HOME CARE VISIT (OUTPATIENT)
Dept: HOME HEALTH SERVICES | Facility: HOME HEALTHCARE | Age: 84
End: 2024-10-21
Payer: MEDICARE

## 2024-10-21 ENCOUNTER — LAB REQUISITION (OUTPATIENT)
Dept: LAB | Facility: HOSPITAL | Age: 84
End: 2024-10-21
Payer: MEDICARE

## 2024-10-21 VITALS
RESPIRATION RATE: 18 BRPM | TEMPERATURE: 97.6 F | SYSTOLIC BLOOD PRESSURE: 128 MMHG | OXYGEN SATURATION: 96 % | DIASTOLIC BLOOD PRESSURE: 60 MMHG | HEART RATE: 82 BPM

## 2024-10-21 DIAGNOSIS — Z45.2 ENCOUNTER FOR ADJUSTMENT AND MANAGEMENT OF VASCULAR ACCESS DEVICE: ICD-10-CM

## 2024-10-21 LAB
ALBUMIN SERPL-MCNC: 3.5 G/DL (ref 3.5–5.2)
ALBUMIN/GLOB SERPL: 1 G/DL
ALP SERPL-CCNC: 102 U/L (ref 39–117)
ALT SERPL W P-5'-P-CCNC: 6 U/L (ref 1–33)
ANION GAP SERPL CALCULATED.3IONS-SCNC: 12.7 MMOL/L (ref 5–15)
AST SERPL-CCNC: 10 U/L (ref 1–32)
BILIRUB SERPL-MCNC: 0.2 MG/DL (ref 0–1.2)
BUN SERPL-MCNC: 12 MG/DL (ref 8–23)
BUN/CREAT SERPL: 6.9 (ref 7–25)
CALCIUM SPEC-SCNC: 9.4 MG/DL (ref 8.6–10.5)
CHLORIDE SERPL-SCNC: 103 MMOL/L (ref 98–107)
CO2 SERPL-SCNC: 25.3 MMOL/L (ref 22–29)
CREAT SERPL-MCNC: 1.74 MG/DL (ref 0.57–1)
DEPRECATED RDW RBC AUTO: 39.1 FL (ref 37–54)
EGFRCR SERPLBLD CKD-EPI 2021: 28.3 ML/MIN/1.73
ERYTHROCYTE [DISTWIDTH] IN BLOOD BY AUTOMATED COUNT: 11.9 % (ref 12.3–15.4)
GLOBULIN UR ELPH-MCNC: 3.5 GM/DL
GLUCOSE SERPL-MCNC: 97 MG/DL (ref 65–99)
HCT VFR BLD AUTO: 26.6 % (ref 34–46.6)
HGB BLD-MCNC: 9 G/DL (ref 12–15.9)
MCH RBC QN AUTO: 30.6 PG (ref 26.6–33)
MCHC RBC AUTO-ENTMCNC: 33.8 G/DL (ref 31.5–35.7)
MCV RBC AUTO: 90.5 FL (ref 79–97)
PLATELET # BLD AUTO: 216 10*3/MM3 (ref 140–450)
PMV BLD AUTO: 12 FL (ref 6–12)
POTASSIUM SERPL-SCNC: 3.7 MMOL/L (ref 3.5–5.2)
PROT SERPL-MCNC: 7 G/DL (ref 6–8.5)
RBC # BLD AUTO: 2.94 10*6/MM3 (ref 3.77–5.28)
SODIUM SERPL-SCNC: 141 MMOL/L (ref 136–145)
WBC NRBC COR # BLD AUTO: 6.95 10*3/MM3 (ref 3.4–10.8)

## 2024-10-21 PROCEDURE — 80053 COMPREHEN METABOLIC PANEL: CPT | Performed by: INTERNAL MEDICINE

## 2024-10-21 PROCEDURE — 85027 COMPLETE CBC AUTOMATED: CPT | Performed by: INTERNAL MEDICINE

## 2024-10-21 PROCEDURE — G0299 HHS/HOSPICE OF RN EA 15 MIN: HCPCS

## 2024-10-22 NOTE — HOME HEALTH
Central line dressing changed in sterile fashion  labs collected and taken to [facility]  no issues reported with IV abx admin or line  CP assessment WNL  Swelling and redness in right foot much improved

## 2024-10-23 ENCOUNTER — OFFICE VISIT (OUTPATIENT)
Dept: SURGERY | Facility: CLINIC | Age: 84
End: 2024-10-23
Payer: MEDICARE

## 2024-10-23 VITALS — BODY MASS INDEX: 28.48 KG/M2 | HEIGHT: 64 IN | WEIGHT: 166.8 LBS

## 2024-10-23 DIAGNOSIS — Z09 ENCOUNTER FOR FOLLOW-UP: Primary | ICD-10-CM

## 2024-10-23 NOTE — PROGRESS NOTES
07/31/2024  Michael Paredes CC:  Postop check    S:  84-year-old lady with a Tamayo catheter in place.  She is 2 weeks into her antibiotic treatment with 2 more weeks planned and then follow-up tests.  She is having no complaints and her daughter had removed the stitch at the jugular incision.  She did not bring a sterile dressing to have this changed.  She has no pain associated with her stitches and is tolerating all therapy well.    O:  Vital signs noted  Skin: Well-healed small incision right neck, Tamayo catheter dressed and sterile dressings without overt signs of infection, nontender    A/P:  I advised her that the sutures do not need to come out as they were Vicryl and will be absorbed and fall off on their own or alternatively when they catheter is removed in the next several weeks if it is still present can be cut out then.  After she has received approval to stop her Tamayo catheter, she can contact Dr. Bryant to have it removed.  All questions were answered and she was willing to proceed with all recommendations.    Celso Corral PA-C   products? Yes  ASA Score: 3  Day of Surgery Review of History & Physical: H&P Update referred to the surgeon/provider.  Anesthesia Plan Notes: Will monitor glucose  Will continue basal insulin through his pump. May need additional boluses.     Ready For Surgery From Anesthesia Perspective.     .

## 2024-10-28 ENCOUNTER — HOME CARE VISIT (OUTPATIENT)
Dept: HOME HEALTH SERVICES | Facility: HOME HEALTHCARE | Age: 84
End: 2024-10-28
Payer: MEDICARE

## 2024-10-28 ENCOUNTER — LAB REQUISITION (OUTPATIENT)
Dept: LAB | Facility: HOSPITAL | Age: 84
End: 2024-10-28
Payer: MEDICARE

## 2024-10-28 VITALS
DIASTOLIC BLOOD PRESSURE: 70 MMHG | TEMPERATURE: 98.1 F | RESPIRATION RATE: 18 BRPM | SYSTOLIC BLOOD PRESSURE: 126 MMHG | HEART RATE: 85 BPM | OXYGEN SATURATION: 99 %

## 2024-10-28 DIAGNOSIS — Z45.2 ENCOUNTER FOR ADJUSTMENT AND MANAGEMENT OF VASCULAR ACCESS DEVICE: ICD-10-CM

## 2024-10-28 LAB
ALBUMIN SERPL-MCNC: 3.6 G/DL (ref 3.5–5.2)
ALBUMIN/GLOB SERPL: 1 G/DL
ALP SERPL-CCNC: 110 U/L (ref 39–117)
ALT SERPL W P-5'-P-CCNC: <5 U/L (ref 1–33)
ANION GAP SERPL CALCULATED.3IONS-SCNC: 11.5 MMOL/L (ref 5–15)
AST SERPL-CCNC: 13 U/L (ref 1–32)
BILIRUB SERPL-MCNC: 0.3 MG/DL (ref 0–1.2)
BUN SERPL-MCNC: 10 MG/DL (ref 8–23)
BUN/CREAT SERPL: 5.6 (ref 7–25)
CALCIUM SPEC-SCNC: 9.1 MG/DL (ref 8.6–10.5)
CHLORIDE SERPL-SCNC: 104 MMOL/L (ref 98–107)
CO2 SERPL-SCNC: 26.5 MMOL/L (ref 22–29)
CREAT SERPL-MCNC: 1.8 MG/DL (ref 0.57–1)
DEPRECATED RDW RBC AUTO: 42.7 FL (ref 37–54)
EGFRCR SERPLBLD CKD-EPI 2021: 27.2 ML/MIN/1.73
ERYTHROCYTE [DISTWIDTH] IN BLOOD BY AUTOMATED COUNT: 12.6 % (ref 12.3–15.4)
GLOBULIN UR ELPH-MCNC: 3.6 GM/DL
GLUCOSE SERPL-MCNC: 107 MG/DL (ref 65–99)
HCT VFR BLD AUTO: 30.3 % (ref 34–46.6)
HGB BLD-MCNC: 9.2 G/DL (ref 12–15.9)
MCH RBC QN AUTO: 28.1 PG (ref 26.6–33)
MCHC RBC AUTO-ENTMCNC: 30.4 G/DL (ref 31.5–35.7)
MCV RBC AUTO: 92.7 FL (ref 79–97)
PLATELET # BLD AUTO: 173 10*3/MM3 (ref 140–450)
PMV BLD AUTO: 12.8 FL (ref 6–12)
POTASSIUM SERPL-SCNC: 3.3 MMOL/L (ref 3.5–5.2)
PROT SERPL-MCNC: 7.2 G/DL (ref 6–8.5)
RBC # BLD AUTO: 3.27 10*6/MM3 (ref 3.77–5.28)
SODIUM SERPL-SCNC: 142 MMOL/L (ref 136–145)
WBC NRBC COR # BLD AUTO: 6.83 10*3/MM3 (ref 3.4–10.8)

## 2024-10-28 PROCEDURE — 80053 COMPREHEN METABOLIC PANEL: CPT | Performed by: INTERNAL MEDICINE

## 2024-10-28 PROCEDURE — 85027 COMPLETE CBC AUTOMATED: CPT | Performed by: INTERNAL MEDICINE

## 2024-10-28 PROCEDURE — G0299 HHS/HOSPICE OF RN EA 15 MIN: HCPCS

## 2024-10-28 NOTE — HOME HEALTH
labs collected and taken to [facility] lab  CP assessment WNL  Unable to change central line dressing due to no supplies delivered by pharmacy, will need to come back Wednesday rather than Tuesday per family request to change line dressing.  Called pharmacy to clarify when abx are ending and to speak about delivery.  Pharmacy states today is last day of abx, will call Dr. Lowe to see if he has any objection to line being removed, but will change central line dressing still on Wednesday as it may be some time before patient can get into office to have mccormick removed.

## 2024-10-30 ENCOUNTER — OFFICE VISIT (OUTPATIENT)
Dept: CARDIOLOGY | Facility: CLINIC | Age: 84
End: 2024-10-30
Payer: MEDICARE

## 2024-10-30 ENCOUNTER — HOME CARE VISIT (OUTPATIENT)
Dept: HOME HEALTH SERVICES | Facility: HOME HEALTHCARE | Age: 84
End: 2024-10-30
Payer: MEDICARE

## 2024-10-30 VITALS
DIASTOLIC BLOOD PRESSURE: 68 MMHG | SYSTOLIC BLOOD PRESSURE: 132 MMHG | HEART RATE: 90 BPM | BODY MASS INDEX: 29.59 KG/M2 | WEIGHT: 167 LBS | HEIGHT: 63 IN

## 2024-10-30 DIAGNOSIS — I10 PRIMARY HYPERTENSION: ICD-10-CM

## 2024-10-30 DIAGNOSIS — Z09 HOSPITAL DISCHARGE FOLLOW-UP: ICD-10-CM

## 2024-10-30 DIAGNOSIS — I47.10 SUSTAINED SVT: Primary | ICD-10-CM

## 2024-10-30 PROCEDURE — G0299 HHS/HOSPICE OF RN EA 15 MIN: HCPCS

## 2024-10-30 RX ORDER — METOPROLOL SUCCINATE 25 MG/1
37.5 TABLET, EXTENDED RELEASE ORAL
Qty: 45 TABLET | Refills: 5 | Status: SHIPPED | OUTPATIENT
Start: 2024-10-30 | End: 2025-04-28

## 2024-10-30 RX ORDER — AMLODIPINE BESYLATE 5 MG/1
5 TABLET ORAL DAILY
Qty: 30 TABLET | Refills: 11 | Status: SHIPPED | OUTPATIENT
Start: 2024-10-30

## 2024-10-30 NOTE — PATIENT INSTRUCTIONS
-stop norvasc 10 mg daily and metoprolol succinate 25 mg daily.    -start norvasc 5 mg daily and metoprolol succinate 37.5 mg daily.    -With the first few days take the blood pressure and HR prior to taking medications then monitor blood pressure 1 hour and a half after taking blood pressure medications and  write the reading down along with heart rate.  Please bring these readings with you on your follow up. Call if HR <60 sys <100, or dizzy

## 2024-10-30 NOTE — PROGRESS NOTES
Subjective:        Anita Frankel is a 84 y.o. female who here for follow up    No chief complaint on file.      HPI       Anita Frankel is an 84-year-old female who is here today for hospital follow-up for SVT.  She has a history of hypertension and kidney stones.  Holter monitor pending.    10/2/2024 echo: EF 74.6%, several walls hypokinetic.  LV diastolic was normal.  Moderate TV regurgitation.  RVSP is mildly elevated.    10/3/2024 stress test indicated Myocard perfusion imaging with a small sized, mildly severe area of ischemia located in the mid inferior and lateral wall.    The following portions of the patient's history were reviewed and updated as appropriate: allergies, current medications, past family history, past medical history, past social history, past surgical history and problem list.    Past Medical History:   Diagnosis Date    Hypertension     Kidney stone          reports that she has never smoked. She has been exposed to tobacco smoke. She has never used smokeless tobacco. She reports current alcohol use. She reports that she does not use drugs.     Family History   Problem Relation Age of Onset    Malig Hyperthermia Neg Hx        ROS     Review of Systems  Constitutional: No wt loss, fever, fatigue  Gastrointestinal: No nausea, abdominal pain  Behavioral/Psych: No insomnia or anxiety  Cardiovascular denies cp, shob, or palps      Objective:           Vitals and nursing note reviewed.   Constitutional:       Appearance: Well-developed.   HENT:      Head: Normocephalic.      Right Ear: External ear normal.      Left Ear: External ear normal.   Neck:      Vascular: No JVD.   Pulmonary:      Effort: Pulmonary effort is normal. No respiratory distress.      Breath sounds: Normal breath sounds. No stridor. No rales.   Cardiovascular:      Normal rate. Regular rhythm.      No gallop.    Pulses:     Intact distal pulses.   Edema:     Peripheral edema absent.   Abdominal:      General: Bowel sounds are  normal. There is no distension.      Palpations: Abdomen is soft.      Tenderness: There is no abdominal tenderness. There is no guarding.   Musculoskeletal: Normal range of motion.         General: No tenderness.      Cervical back: Normal range of motion. Skin:     General: Skin is warm.   Neurological:      Mental Status: Alert and oriented to person, place, and time.      Deep Tendon Reflexes: Reflexes are normal and symmetric.   Psychiatric:         Judgment: Judgment normal.           ECG 12 Lead    Date/Time: 10/30/2024 1:12 PM  Performed by: Jeny Rodriguez APRN    Authorized by: Jeny Rodriguez APRN  Comparison: compared with previous ECG from 10/3/2024  Similar to previous ECG  Rhythm: sinus rhythm  Rate: normal  BPM: 87    Clinical impression: non-specific ECG          10/3/24    Interpretation Summary         Findings consistent with a normal ECG stress test.    Impressions are consistent with an intermediate risk study.    Left ventricular ejection fraction is normal (Calculated EF = 60%).    Myocardial perfusion imaging indicates a small-sized, mildly severe area of ischemia located in the inferior wall and lateral wall.       10/2/24    Interpretation Summary         Left ventricular systolic function is hyperdynamic (EF > 70%). Calculated left ventricular EF = 74.6%    The following left ventricular wall segments are hyperkinetic: basal anterior, basal anterolateral, basal inferolateral, basal inferior, basal inferoseptal, basal anteroseptal, mid anterior, mid anterolateral, mid inferolateral, mid inferior, mid inferoseptal, mid anteroseptal, apical anterior, apical lateral, apical inferior, apical septal and apex.    Left ventricular diastolic function was normal.    Moderate tricuspid valve regurgitation is present.    Estimated right ventricular systolic pressure from tricuspid regurgitation is mildly elevated (35-45 mmHg). Calculated right ventricular systolic pressure from tricuspid  regurgitation is 43 mmHg.         Current Outpatient Medications:     acetaminophen (TYLENOL) 500 MG tablet, Take 1 tablet by mouth Every 6 (Six) Hours As Needed for Fever, Headache or Mild Pain., Disp: , Rfl:     amLODIPine (NORVASC) 10 MG tablet, Take 1 tablet by mouth Every Night., Disp: , Rfl:     aspirin 81 MG EC tablet, Take 1 tablet by mouth Every Night. Indications: Arthritis, Disp: , Rfl:     calcium polycarbophil (FIBER LAXATIVE) 625 MG tablet, Take 1 tablet by mouth Daily., Disp: , Rfl:     cholecalciferol (VITAMIN D3) 1000 units tablet, Take 2 tablets by mouth Daily., Disp: , Rfl:     desloratadine (CLARINEX REDITAB) 5 MG disintegrating tablet, Take 1 tablet by mouth Every Morning., Disp: , Rfl:     famotidine (PEPCID) 20 MG tablet, Take 1 tablet by mouth 2 (Two) Times a Day for 30 days., Disp: 60 tablet, Rfl: 0    melatonin 1 MG tablet, Take 1 tablet by mouth At Night As Needed for Sleep., Disp: , Rfl:     memantine (NAMENDA) 10 MG tablet, Take 1 tablet by mouth 2 (Two) Times a Day., Disp: , Rfl:     metoprolol succinate XL (TOPROL-XL) 25 MG 24 hr tablet, Take 1 tablet by mouth Daily for 30 days., Disp: 30 tablet, Rfl: 0    rivastigmine (EXELON) 6 MG capsule, Take 1 capsule by mouth 2 (Two) Times a Day., Disp: , Rfl:     rosuvastatin (CRESTOR) 10 MG tablet, Take 1 tablet by mouth Every Night for 30 days., Disp: 30 tablet, Rfl: 0    sodium bicarbonate 650 MG tablet, Take 1 tablet by mouth 3 (Three) Times a Day for 30 days., Disp: 90 tablet, Rfl: 0    traMADol (ULTRAM) 50 MG tablet, Take 1 tablet by mouth 2 (Two) Times a Day As Needed for Moderate Pain., Disp: , Rfl:      Assessment:        Patient Active Problem List   Diagnosis    SHARON (acute kidney injury)    Acute renal failure               Plan:   1.  Hospital follow-up for SVT: Monitor pending.  Will have to call with results.  EKG today shows sinus rhythm.    2.  Hypertension    Educated patient on exercising for at least 30 minutes a day for 2 to  3 days a week. Importance of controlling hypertension and blood pressure checkup on the regular basis has been explained. Hypertension as a silent killer has been discussed. Risk reduction of the weight and regular exercises to control the hypertension has been explained.                     No diagnosis found.    There are no diagnoses linked to this encounter.    COUNSELING: denies Anita FrankelCounseling was given to patient for the following topics: diagnostic results, risk factor reductions, impressions, risks and benefits of treatment options and importance of treatment compliance .       SMOKING COUNSELING: done     -stop norvasc 10 mg daily and metoprolol succinate 25 mg daily.    -start norvasc 5 mg daily and metoprolol succinate 37.5 mg daily.    - do blood pressure diary.     -3 months with 14 day holter prior to ov.      Sincerely,   GLENN Goode  [facility]  10/30/24  12:41 EDT    EMR Dragon/Transcription disclaimer: Dictated utilizing Dragon Dictation

## 2024-11-04 ENCOUNTER — LAB REQUISITION (OUTPATIENT)
Dept: LAB | Facility: HOSPITAL | Age: 84
End: 2024-11-04
Payer: MEDICARE

## 2024-11-04 ENCOUNTER — HOME CARE VISIT (OUTPATIENT)
Dept: HOME HEALTH SERVICES | Facility: HOME HEALTHCARE | Age: 84
End: 2024-11-04
Payer: MEDICARE

## 2024-11-04 VITALS
HEART RATE: 87 BPM | SYSTOLIC BLOOD PRESSURE: 120 MMHG | TEMPERATURE: 98.5 F | RESPIRATION RATE: 17 BRPM | DIASTOLIC BLOOD PRESSURE: 62 MMHG | OXYGEN SATURATION: 98 %

## 2024-11-04 DIAGNOSIS — Z87.448 PERSONAL HISTORY OF OTHER DISEASES OF URINARY SYSTEM: ICD-10-CM

## 2024-11-04 LAB
ALBUMIN SERPL-MCNC: 3.6 G/DL (ref 3.5–5.2)
ALBUMIN SERPL-MCNC: 3.6 G/DL (ref 3.5–5.2)
ALBUMIN/GLOB SERPL: 0.9 G/DL
ALP SERPL-CCNC: 115 U/L (ref 39–117)
ALT SERPL W P-5'-P-CCNC: 6 U/L (ref 1–33)
ANION GAP SERPL CALCULATED.3IONS-SCNC: 17.2 MMOL/L (ref 5–15)
ANION GAP SERPL CALCULATED.3IONS-SCNC: 17.2 MMOL/L (ref 5–15)
AST SERPL-CCNC: 13 U/L (ref 1–32)
BILIRUB SERPL-MCNC: <0.2 MG/DL (ref 0–1.2)
BUN SERPL-MCNC: 12 MG/DL (ref 8–23)
BUN SERPL-MCNC: 12 MG/DL (ref 8–23)
BUN/CREAT SERPL: 7.5 (ref 7–25)
BUN/CREAT SERPL: 7.5 (ref 7–25)
CALCIUM SPEC-SCNC: 9.4 MG/DL (ref 8.6–10.5)
CALCIUM SPEC-SCNC: 9.4 MG/DL (ref 8.6–10.5)
CHLORIDE SERPL-SCNC: 102 MMOL/L (ref 98–107)
CHLORIDE SERPL-SCNC: 102 MMOL/L (ref 98–107)
CO2 SERPL-SCNC: 23.8 MMOL/L (ref 22–29)
CO2 SERPL-SCNC: 23.8 MMOL/L (ref 22–29)
CREAT SERPL-MCNC: 1.6 MG/DL (ref 0.57–1)
CREAT SERPL-MCNC: 1.6 MG/DL (ref 0.57–1)
DEPRECATED RDW RBC AUTO: 42.4 FL (ref 37–54)
EGFRCR SERPLBLD CKD-EPI 2021: 31.3 ML/MIN/1.73
EGFRCR SERPLBLD CKD-EPI 2021: 31.3 ML/MIN/1.73
ERYTHROCYTE [DISTWIDTH] IN BLOOD BY AUTOMATED COUNT: 13.1 % (ref 12.3–15.4)
GLOBULIN UR ELPH-MCNC: 3.9 GM/DL
GLUCOSE SERPL-MCNC: 87 MG/DL (ref 65–99)
GLUCOSE SERPL-MCNC: 87 MG/DL (ref 65–99)
HCT VFR BLD AUTO: 30.8 % (ref 34–46.6)
HGB BLD-MCNC: 9.9 G/DL (ref 12–15.9)
MCH RBC QN AUTO: 29.1 PG (ref 26.6–33)
MCHC RBC AUTO-ENTMCNC: 32.1 G/DL (ref 31.5–35.7)
MCV RBC AUTO: 90.6 FL (ref 79–97)
PHOSPHATE SERPL-MCNC: 3.3 MG/DL (ref 2.5–4.5)
PLATELET # BLD AUTO: 173 10*3/MM3 (ref 140–450)
PMV BLD AUTO: 12.5 FL (ref 6–12)
POTASSIUM SERPL-SCNC: 3.2 MMOL/L (ref 3.5–5.2)
POTASSIUM SERPL-SCNC: 3.2 MMOL/L (ref 3.5–5.2)
PROT SERPL-MCNC: 7.5 G/DL (ref 6–8.5)
RBC # BLD AUTO: 3.4 10*6/MM3 (ref 3.77–5.28)
SODIUM SERPL-SCNC: 143 MMOL/L (ref 136–145)
SODIUM SERPL-SCNC: 143 MMOL/L (ref 136–145)
WBC NRBC COR # BLD AUTO: 5.53 10*3/MM3 (ref 3.4–10.8)

## 2024-11-04 PROCEDURE — G0299 HHS/HOSPICE OF RN EA 15 MIN: HCPCS

## 2024-11-04 PROCEDURE — 84100 ASSAY OF PHOSPHORUS: CPT | Performed by: INTERNAL MEDICINE

## 2024-11-04 PROCEDURE — 80053 COMPREHEN METABOLIC PANEL: CPT | Performed by: INTERNAL MEDICINE

## 2024-11-04 PROCEDURE — 85027 COMPLETE CBC AUTOMATED: CPT | Performed by: INTERNAL MEDICINE

## 2024-11-04 NOTE — HOME HEALTH
Patient seen for PICC line dressing change, completed with sterile technique.  CP assessment WNL.  Per Dr. Lowe adding abx until patient has scan and then at that point he will decide if she needs further abx or not.

## 2024-11-11 ENCOUNTER — LAB REQUISITION (OUTPATIENT)
Dept: LAB | Facility: HOSPITAL | Age: 84
End: 2024-11-11
Payer: MEDICARE

## 2024-11-11 ENCOUNTER — HOME CARE VISIT (OUTPATIENT)
Dept: HOME HEALTH SERVICES | Facility: HOME HEALTHCARE | Age: 84
End: 2024-11-11
Payer: MEDICARE

## 2024-11-11 VITALS
DIASTOLIC BLOOD PRESSURE: 68 MMHG | RESPIRATION RATE: 17 BRPM | OXYGEN SATURATION: 95 % | HEART RATE: 80 BPM | TEMPERATURE: 98.3 F | SYSTOLIC BLOOD PRESSURE: 152 MMHG

## 2024-11-11 DIAGNOSIS — A41.9 SEPSIS, UNSPECIFIED ORGANISM: ICD-10-CM

## 2024-11-11 LAB
ALBUMIN SERPL-MCNC: 3.7 G/DL (ref 3.5–5.2)
ALBUMIN/GLOB SERPL: 1.1 G/DL
ALP SERPL-CCNC: 116 U/L (ref 39–117)
ALT SERPL W P-5'-P-CCNC: 9 U/L (ref 1–33)
ANION GAP SERPL CALCULATED.3IONS-SCNC: 13 MMOL/L (ref 5–15)
AST SERPL-CCNC: 15 U/L (ref 1–32)
BILIRUB SERPL-MCNC: 0.5 MG/DL (ref 0–1.2)
BUN SERPL-MCNC: 10 MG/DL (ref 8–23)
BUN/CREAT SERPL: 7.8 (ref 7–25)
CALCIUM SPEC-SCNC: 9.3 MG/DL (ref 8.6–10.5)
CHLORIDE SERPL-SCNC: 105 MMOL/L (ref 98–107)
CO2 SERPL-SCNC: 25 MMOL/L (ref 22–29)
CREAT SERPL-MCNC: 1.28 MG/DL (ref 0.57–1)
DEPRECATED RDW RBC AUTO: 41 FL (ref 37–54)
EGFRCR SERPLBLD CKD-EPI 2021: 40.9 ML/MIN/1.73
ERYTHROCYTE [DISTWIDTH] IN BLOOD BY AUTOMATED COUNT: 13.2 % (ref 12.3–15.4)
GLOBULIN UR ELPH-MCNC: 3.5 GM/DL
GLUCOSE SERPL-MCNC: 78 MG/DL (ref 65–99)
HCT VFR BLD AUTO: 29 % (ref 34–46.6)
HGB BLD-MCNC: 9.4 G/DL (ref 12–15.9)
MCH RBC QN AUTO: 28 PG (ref 26.6–33)
MCHC RBC AUTO-ENTMCNC: 32.4 G/DL (ref 31.5–35.7)
MCV RBC AUTO: 86.3 FL (ref 79–97)
PLATELET # BLD AUTO: 137 10*3/MM3 (ref 140–450)
PMV BLD AUTO: 12.3 FL (ref 6–12)
POTASSIUM SERPL-SCNC: 3.7 MMOL/L (ref 3.5–5.2)
PROT SERPL-MCNC: 7.2 G/DL (ref 6–8.5)
RBC # BLD AUTO: 3.36 10*6/MM3 (ref 3.77–5.28)
SODIUM SERPL-SCNC: 143 MMOL/L (ref 136–145)
WBC NRBC COR # BLD AUTO: 4.41 10*3/MM3 (ref 3.4–10.8)

## 2024-11-11 PROCEDURE — G0299 HHS/HOSPICE OF RN EA 15 MIN: HCPCS

## 2024-11-11 PROCEDURE — 80053 COMPREHEN METABOLIC PANEL: CPT | Performed by: INTERNAL MEDICINE

## 2024-11-11 PROCEDURE — 85027 COMPLETE CBC AUTOMATED: CPT | Performed by: INTERNAL MEDICINE

## 2024-11-12 ENCOUNTER — DOCUMENTATION (OUTPATIENT)
Dept: INTERNAL MEDICINE | Facility: HOSPITAL | Age: 84
End: 2024-11-12
Payer: MEDICARE

## 2024-11-12 NOTE — PROGRESS NOTES
Call received from Rhonaemre Castillo daughter of Ms. Key Olson that patient had CT scan of the abdomen and pelvis performed at urologist office.  I do not have the official reports available but I was informed by Ms. Camp that she was told that the spot in her left kidney is unchanged compared to what was seen on 9/30/2024.  Patient has been on IV Rocephin since 10/6/2024 until last dose to be administered today.  She has a Tamayo catheter placed by general surgery service prior to her discharge from the hospital.  I reviewed her latest lab work which shows resolution of leukocytosis, improvement in her renal function and I talked to Ms. Camp about Ms. Dominguez's clinical status and according to her she is doing well in terms of discomfort in her back and does not have any fever or chills or signs of any symptoms of infection.  She is able to ambulate and eating well.  I was informed by Ms. Camp that urology service after reviewing the CAT scan recommended to continue antibiotics with plans for percutaneous aspiration/biopsy of this area and her left nephric /perinephric region.  I discussed with Ms. Camp about caveat of continued use of antibiotic therapy beyond 4 weeks that she has received for suspected nephric/perinephric abscess and recommended to discontinue antibiotic therapy after last dose today with observation off antibiotic therapy going forward until the percutaneous aspiration/biopsy is performed as recommended by urology service.  I also explained to her that patient need to be seen by Dr. Galeano for removal of Tamayo catheter.  I explained to Ms. Camp to keep an eye on Ms. Frankel's clinical course including recurrence of fever confusion altered mental status weakness and if that occurs patient need to come to the hospital for further evaluation.  Patient need to be seen in the emergency room for recurrence of the symptoms if they occur.  I explained to her that it is of paramount  importance to have some sort of biopsy/aspiration/sampling of the left nephric perinephric area seen unchanged on t the CAT scan done at urology service earlier today to make sure that we are not missing malignancy and if it turns out to be infection then obviously prolonged antibiotic therapy has not made any difference in terms of resolution or decrease in size and hence surgical intervention would be indicated besides resumption of antibiotic therapy.  Home health called me on 11/11/2024 for orders for discontinue home health visits as patient has been independent and doing well.  Impression: Clinically improved after 4 weeks of IV Rocephin for complicated E. coli pyelonephritis with ? left nephric/perinephric abscess-with stable/unchanged left nephric perinephric abscess as reported by patient's daughter who had conversation with urology service about the CT scan that was performed earlier today on 11/12/2024.  Recommendation/discussion: See above.

## 2024-11-13 ENCOUNTER — TRANSCRIBE ORDERS (OUTPATIENT)
Dept: ULTRASOUND IMAGING | Facility: HOSPITAL | Age: 84
End: 2024-11-13
Payer: MEDICARE

## 2024-11-13 DIAGNOSIS — N28.1 RENAL CYST: Primary | ICD-10-CM

## 2024-11-15 ENCOUNTER — TELEPHONE (OUTPATIENT)
Dept: SURGERY | Facility: CLINIC | Age: 84
End: 2024-11-15
Payer: MEDICARE

## 2024-11-15 NOTE — TELEPHONE ENCOUNTER
Patient's POA/daughter, Rhona Castillo is calling to see if you can remove her Tamayo Line that was inserted by you as in office procedure and if so, where/when would you like to schedule?  Please advise.

## 2024-11-18 ENCOUNTER — TELEPHONE (OUTPATIENT)
Dept: SURGERY | Facility: CLINIC | Age: 84
End: 2024-11-18
Payer: MEDICARE

## 2024-11-18 NOTE — TELEPHONE ENCOUNTER
Patient will see you in office on Thursday to have the mccormick line removed.  The last dressing change they had performed by Home Health was last Monday and they are no longer coming.  Is it OK for the patient go approximately 10 days with the current dressing in place; they were having weekly.  Do they need to have another one before seeing you in office on Thursday?  Daughter reports that dressing and area look fine.  Please advise.

## 2024-11-18 NOTE — TELEPHONE ENCOUNTER
Relayed information about dressing change not necessary prior to appointment on 11/21 from Dr. Bryant to patient POC, Rhona Castillo.

## 2024-11-21 ENCOUNTER — OFFICE VISIT (OUTPATIENT)
Dept: SURGERY | Facility: CLINIC | Age: 84
End: 2024-11-21
Payer: MEDICARE

## 2024-11-21 VITALS
OXYGEN SATURATION: 97 % | WEIGHT: 165.4 LBS | BODY MASS INDEX: 29.3 KG/M2 | HEIGHT: 63 IN | HEART RATE: 77 BPM | SYSTOLIC BLOOD PRESSURE: 146 MMHG | DIASTOLIC BLOOD PRESSURE: 68 MMHG

## 2024-11-21 DIAGNOSIS — Z98.890: Primary | ICD-10-CM

## 2024-11-21 NOTE — PROGRESS NOTES
DATE OF Procedure: 11/21/24       SURGEON:   Chu Bryant MD     PREOPERATIVE DIAGNOSIS: Unwanted Tamayo catheter     POSTOPERATIVE DIAGNOSIS: Same     PROCEDURE PERFORMED: Removal of tunneled Tamayo catheter     ANESTHESIA: Local     SPECIMEN: None     BLOOD LOSS: minimal     INDICATIONS FOR OPERATION: Miss Frankel is an 84-year-old lady status post placement of a right internal jugular Tamayo catheter on 10/9/2024.  She has history of chronic kidney disease and was not cleared for PICC placement by nephrology.  Tamayo catheter was placed for 4-week course of outpatient antibiotics.  She has completed this and Dr. Lowe has recommended removal of the Tamayo catheter as there are no plans for ongoing antibiotic therapy.  She has a biopsy/aspiration of perinephric fluid scheduled.  Risk of removal of a Tamayo catheter in the office was discussed with her and her daughter and they were in agreement with proceeding.     OPERATIVE REPORT: In the procedure room a timeout was performed identifying correct patient, procedure, and location of procedure.  Local anesthetic was injected around the insertion site of the catheter into the skin.  Dissection was carried out with tenotomy scissors freeing the tissue cuff from the underlying tissue.  Patient then Valsalva and the line was pulled while holding pressure on the neck.  Pressure was held for 5 minutes and then the open wound was packed with quarter inch iodoform gauze and covered with a bandage.    Recommendations:  - Following the procedure she was set up and after sitting up she did experience some dizziness.  Her vitals were checked and were stable with a normal heart rate and slightly hypertensive blood pressure.  She was monitored for 15 to 20 minutes and given food.  Unsure if she had eaten yet that day.  Her symptoms had fully resolved and she was no longer having any lingering effects.  Vitals remained stable.  I counseled her and her daughter that if she  had any ongoing dizziness throughout the remainder of the day that she should be evaluated in the emergency room.  -Recommend removing the Band-Aid and packing tomorrow.  She can then just keep the site covered with a Band-Aid until it is fully healed.  She can change this daily.      Chu Bryant M.D.  General and Endoscopic Surgery  [facility]     [address] [place] [address], [address]  [place], [place], [address]  P: [phone]  F: [phone]

## 2024-11-25 ENCOUNTER — TELEPHONE (OUTPATIENT)
Dept: CARDIOLOGY | Facility: CLINIC | Age: 84
End: 2024-11-25
Payer: MEDICARE

## 2024-11-25 NOTE — TELEPHONE ENCOUNTER
DAUGHTER STATES THAT PATIENT'S BP MEDICATION WAS CHANGED THE  LAST VISIT AND NOW HER BP IS RUNNING IN 'S. DAUGHTER WANTS TO KNOW IF YOU WOULD LIKE TO MAKE ANY FURTHER ADJUSTMENTS.  PLEASE CALL DAUGHTER (OLIVIA) [phone]

## 2024-11-27 ENCOUNTER — TELEPHONE (OUTPATIENT)
Dept: CARDIOLOGY | Facility: CLINIC | Age: 84
End: 2024-11-27
Payer: MEDICARE

## 2024-11-27 NOTE — TELEPHONE ENCOUNTER
I called and spoke to daughter  Rhona regarding her mother's blood pressure of sys 140's and above.  She will stop amlodipine 5 mg and start taking her 10 mg daily.  She will continue to monitor her blood pressure and heart rate and call with any systolic less than 100 or heart rate less than 60.  She verbalizes understanding.    GLENN Goode

## 2024-12-13 ENCOUNTER — TRANSCRIBE ORDERS (OUTPATIENT)
Dept: GENERAL RADIOLOGY | Facility: HOSPITAL | Age: 84
End: 2024-12-13
Payer: MEDICARE

## 2024-12-13 DIAGNOSIS — N17.9 ACUTE RENAL FAILURE, UNSPECIFIED ACUTE RENAL FAILURE TYPE: Primary | ICD-10-CM

## 2024-12-13 DIAGNOSIS — N28.1 ACQUIRED CYST OF KIDNEY: ICD-10-CM

## 2024-12-13 NOTE — PROGRESS NOTES
12/18/24 0001   Pre-Procedure Phone Call   Procedure Time Verified Yes   Arrival Time 1100   Procedure Location Verified Yes   Medical History Reviewed No   NPO Status Reinforced Yes   Ride and Caregiver Arranged Yes   Patient Knows to Bring Current Medications No   Bring Outside Films Requested No

## 2024-12-16 ENCOUNTER — HOSPITAL ENCOUNTER (OUTPATIENT)
Dept: ULTRASOUND IMAGING | Facility: HOSPITAL | Age: 84
Discharge: HOME | End: 2024-12-16
Payer: MEDICARE

## 2024-12-18 ENCOUNTER — HOSPITAL ENCOUNTER (OUTPATIENT)
Dept: CT IMAGING | Facility: HOSPITAL | Age: 84
Discharge: HOME | End: 2024-12-18
Payer: MEDICARE

## 2024-12-18 VITALS
HEART RATE: 70 BPM | OXYGEN SATURATION: 95 % | BODY MASS INDEX: 26.12 KG/M2 | TEMPERATURE: 98.1 F | RESPIRATION RATE: 26 BRPM | SYSTOLIC BLOOD PRESSURE: 135 MMHG | DIASTOLIC BLOOD PRESSURE: 72 MMHG | HEIGHT: 64 IN | WEIGHT: 153 LBS

## 2024-12-18 DIAGNOSIS — N17.9 ACUTE RENAL FAILURE, UNSPECIFIED ACUTE RENAL FAILURE TYPE: ICD-10-CM

## 2024-12-18 DIAGNOSIS — N28.1 ACQUIRED CYST OF KIDNEY: ICD-10-CM

## 2024-12-18 LAB
INR PPP: 1.1 (ref 0.8–1.2)
PLATELET # BLD AUTO: 154 10*3/MM3 (ref 140–450)
PROTHROMBIN TIME: 11.5 SECONDS (ref 12.8–15.2)

## 2024-12-18 PROCEDURE — 85610 PROTHROMBIN TIME: CPT

## 2024-12-18 PROCEDURE — 77012 CT SCAN FOR NEEDLE BIOPSY: CPT

## 2024-12-18 PROCEDURE — 99152 MOD SED SAME PHYS/QHP 5/>YRS: CPT

## 2024-12-18 PROCEDURE — 25010000002 LIDOCAINE 1 % SOLUTION: Performed by: STUDENT IN AN ORGANIZED HEALTH CARE EDUCATION/TRAINING PROGRAM

## 2024-12-18 PROCEDURE — 25010000002 FENTANYL CITRATE (PF) 50 MCG/ML SOLUTION: Performed by: RADIOLOGY

## 2024-12-18 PROCEDURE — 85049 AUTOMATED PLATELET COUNT: CPT | Performed by: RADIOLOGY

## 2024-12-18 PROCEDURE — 25010000002 MIDAZOLAM PER 1 MG: Performed by: RADIOLOGY

## 2024-12-18 RX ORDER — MIDAZOLAM HYDROCHLORIDE 1 MG/ML
INJECTION, SOLUTION INTRAMUSCULAR; INTRAVENOUS AS NEEDED
Status: COMPLETED | OUTPATIENT
Start: 2024-12-18 | End: 2024-12-18

## 2024-12-18 RX ORDER — SODIUM CHLORIDE 9 MG/ML
25 INJECTION, SOLUTION INTRAVENOUS ONCE
OUTPATIENT
Start: 2024-12-18 | End: 2024-12-18

## 2024-12-18 RX ORDER — LIDOCAINE HYDROCHLORIDE 10 MG/ML
20 INJECTION, SOLUTION INFILTRATION; PERINEURAL ONCE
Status: COMPLETED | OUTPATIENT
Start: 2024-12-18 | End: 2024-12-18

## 2024-12-18 RX ORDER — SODIUM CHLORIDE 0.9 % (FLUSH) 0.9 %
10 SYRINGE (ML) INJECTION AS NEEDED
OUTPATIENT
Start: 2024-12-18

## 2024-12-18 RX ORDER — SODIUM CHLORIDE 0.9 % (FLUSH) 0.9 %
3 SYRINGE (ML) INJECTION EVERY 12 HOURS SCHEDULED
OUTPATIENT
Start: 2024-12-18

## 2024-12-18 RX ORDER — FENTANYL CITRATE 50 UG/ML
INJECTION, SOLUTION INTRAMUSCULAR; INTRAVENOUS AS NEEDED
Status: COMPLETED | OUTPATIENT
Start: 2024-12-18 | End: 2024-12-18

## 2024-12-18 RX ADMIN — LIDOCAINE HYDROCHLORIDE 20 ML: 10 INJECTION, SOLUTION INFILTRATION; PERINEURAL at 13:04

## 2024-12-18 RX ADMIN — MIDAZOLAM 0.5 MG: 1 INJECTION INTRAMUSCULAR; INTRAVENOUS at 12:33

## 2024-12-18 RX ADMIN — FENTANYL CITRATE 25 MCG: 50 INJECTION, SOLUTION INTRAMUSCULAR; INTRAVENOUS at 12:33

## 2024-12-18 NOTE — H&P
Name: Anita Frankel ADMIT: 2024   : 1940  PCP: Rowan Bauer MD    MRN: [id number] LOS: 0 days   AGE/SEX: 84 y.o. female  ROOM: Room/bed info not found     Chief complaint   Patient is a 84 y.o. female presents with renal cyst.     History of Present Illness: renal cyst.    Past Surgical History:  Past Surgical History:   Procedure Laterality Date    ABDOMINAL SURGERY      polyp removal    CHOLECYSTECTOMY      HYSTERECTOMY      INSERTION HEMODIALYSIS CATHETER N/A 10/9/2024    Procedure: INSERTION OF HOOVER CATHETER;  Surgeon: Chu Bryant MD;  Location: [facility] OR;  Service: General;  Laterality: N/A;    TONSILLECTOMY         Past Medical History:  Past Medical History:   Diagnosis Date    Hypertension     Kidney stone        Home Medications:  (Not in a hospital admission)      Allergies:  Donepezil    Family History:  Family History   Problem Relation Age of Onset    Malig Hyperthermia Neg Hx        Social History:  Social History     Tobacco Use    Smoking status: Never     Passive exposure: Past    Smokeless tobacco: Never   Vaping Use    Vaping status: Never Used   Substance Use Topics    Alcohol use: Yes     Comment: very rarely    Drug use: No        Objective     Physical Exam:   rrr, nml resp effort    Vital Signs       Anticipated Surgical Procedure:  aspiration    The risks, benefits and alternatives of this procedure have been discussed with the patient or responsible party: Yes      Tito Mauricio MD  24  11:46 EST

## 2024-12-18 NOTE — POST-PROCEDURE NOTE
POST PROCEDURE NOTE    Procedure: renal bx    Pre-Procedure Diagnosis: cyst/lesion    Post-procedure Diagnosis: same    Findings: successful biopsy of decompressed cyst/lesion    Complications: none    Blood loss: min    Specimen Removed: 3x20G cores  Disposition:   Discharge home

## 2024-12-18 NOTE — DISCHARGE INSTRUCTIONS
EDUCATION / DISCHARGE INSTRUCTIONS  Aspiration:  An aspiration is a procedure used to take a sample of cells or tissue from a lump, mass or other area of concern.   Within a week the radiologist will send a report to your physician.  A pathologist will also examine the tissue and send a report.  THIS EDUCATION INFORMATION WAS REVIEWED PRIOR TO THE PROCEDURE AND CONSENT.  Patient initials_________________Time________________      Post Procedure:    *  Rest today (no pushing pulling or straining).   *  Slowly increase activity tomorow.    *  If you received sedation do not drive for 24 hours.   *  Keep dressing clean and dry.   *  Leave dressing on puncture site for 24 hours.    *  You may shower when dressing removed.   *  If needed, use an ice pack at the site for up to 20 minutes at a time for pain.    Call your doctor if experiencing:   *  Signs of infection such as redness, swelling, excessive pain and / or foul      smelling drainage from the puncture site.   *  Chills or fever over 101 degrees (by mouth).   *  Unrelieved pain.   *  Any new or severe symptoms.      Following the procedure:     Follow-up with the ordering physician as directed.    Continue to take other medications as directed by your physician unless  otherwise instructed.   If applicable, resume taking your blood thinners or Aspirin on ____12/19/2024_______.     If you have any concerns please call the Radiology Nurses Desk at [phone].  You are the most important factor in your recovery.  Follow the above instructions carefully.

## 2024-12-19 ENCOUNTER — TELEPHONE (OUTPATIENT)
Dept: RADIOLOGY | Facility: HOSPITAL | Age: 84
End: 2024-12-19
Payer: MEDICARE

## 2024-12-19 LAB
CYTO UR: NORMAL
DX PRELIMINARY: NORMAL
LAB AP CASE REPORT: NORMAL
LAB AP CLINICAL INFORMATION: NORMAL
LAB AP SPECIAL STAINS: NORMAL
PATH REPORT.GROSS SPEC: NORMAL

## 2024-12-30 LAB
CYTO UR: NORMAL
DX PRELIMINARY: NORMAL
LAB AP CASE REPORT: NORMAL
LAB AP CLINICAL INFORMATION: NORMAL
LAB AP SPECIAL STAINS: NORMAL
PATH REPORT.FINAL DX SPEC: NORMAL
PATH REPORT.GROSS SPEC: NORMAL

## 2025-01-30 ENCOUNTER — OFFICE VISIT (OUTPATIENT)
Dept: CARDIOLOGY | Facility: CLINIC | Age: 85
End: 2025-01-30
Payer: MEDICARE

## 2025-01-30 VITALS
DIASTOLIC BLOOD PRESSURE: 69 MMHG | SYSTOLIC BLOOD PRESSURE: 133 MMHG | HEART RATE: 70 BPM | HEIGHT: 64 IN | BODY MASS INDEX: 26.12 KG/M2 | WEIGHT: 153 LBS

## 2025-01-30 DIAGNOSIS — I10 PRIMARY HYPERTENSION: ICD-10-CM

## 2025-01-30 DIAGNOSIS — I47.10 PSVT (PAROXYSMAL SUPRAVENTRICULAR TACHYCARDIA): Primary | ICD-10-CM

## 2025-01-30 PROCEDURE — 99214 OFFICE O/P EST MOD 30 MIN: CPT

## 2025-01-30 RX ORDER — SODIUM BICARBONATE 650 MG/1
650 TABLET ORAL 3 TIMES DAILY
COMMUNITY

## 2025-01-30 NOTE — PROGRESS NOTES
Subjective:        Anita Frankel is a 84 y.o. female who here for follow up    Chief Complaint   Patient presents with    Results     Results       HPI  This is a 84 year old female who is new with this provider with hypertension, she follows up in office today in follow up for psvt noted in previous hospitalization.     Feels well, no chest pain or shortness of breath. No lightheahdedness or palpitations    Holter monitor 1/3/25 nsr with frequent pacs, pvcs, nssvt    The following portions of the patient's history were reviewed and updated as appropriate: allergies, current medications, past family history, past medical history, past social history, past surgical history and problem list.    Past Medical History:   Diagnosis Date    Hypertension     Kidney stone          reports that she has never smoked. She has been exposed to tobacco smoke. She has never used smokeless tobacco. She reports current alcohol use. She reports that she does not use drugs.     Family History   Problem Relation Age of Onset    Malig Hyperthermia Neg Hx        ROS     Review of Systems  Constitutional: No wt loss, fever, fatigue  Gastrointestinal: No nausea, abdominal pain  Behavioral/Psych: No insomnia or anxiety  Cardiovascular no chest pain or shortness of breath      Objective:           Vitals and nursing note reviewed.   Constitutional:       Appearance: Well-developed.   HENT:      Head: Normocephalic.      Right Ear: External ear normal.      Left Ear: External ear normal.   Neck:      Vascular: No JVD.   Pulmonary:      Effort: Pulmonary effort is normal. No respiratory distress.      Breath sounds: Normal breath sounds. No stridor. No rales.   Cardiovascular:      Normal rate. Regular rhythm.      No gallop.    Pulses:     Intact distal pulses.   Edema:     Peripheral edema absent.   Abdominal:      General: Bowel sounds are normal. There is no distension.      Palpations: Abdomen is soft.      Tenderness: There is no  abdominal tenderness. There is no guarding.   Musculoskeletal: Normal range of motion.         General: No tenderness.      Cervical back: Normal range of motion. Skin:     General: Skin is warm.   Neurological:      Mental Status: Alert and oriented to person, place, and time.      Deep Tendon Reflexes: Reflexes are normal and symmetric.   Psychiatric:         Judgment: Judgment normal.         Procedures        Current Outpatient Medications:     Acetaminophen (TYLENOL ARTHRITIS PAIN PO), As Needed., Disp: , Rfl:     acetaminophen (TYLENOL) 500 MG tablet, Take 1 tablet by mouth Every 6 (Six) Hours As Needed for Fever, Headache or Mild Pain. Indications: Pain, Disp: , Rfl:     amLODIPine (NORVASC) 5 MG tablet, Take 1 tablet by mouth Daily. Indications: High Blood Pressure, Disp: 30 tablet, Rfl: 11    aspirin 81 MG EC tablet, Take 1 tablet by mouth Every Night. Indications: Arthritis, Disp: , Rfl:     calcium polycarbophil (FIBER LAXATIVE) 625 MG tablet, Take 1 tablet by mouth Daily., Disp: , Rfl:     cholecalciferol (VITAMIN D3) 1000 units tablet, Take 2 tablets by mouth Daily. Indications: Vitamin D Deficiency, Disp: , Rfl:     desloratadine (CLARINEX REDITAB) 5 MG disintegrating tablet, Take 1 tablet by mouth Every Morning. Indications: Hayfever, Disp: , Rfl:     memantine (NAMENDA) 10 MG tablet, Take 1 tablet by mouth 2 (Two) Times a Day. Indications: Cognitive Dysfunction, Disp: , Rfl:     metoprolol succinate XL (TOPROL-XL) 25 MG 24 hr tablet, Take 1.5 tablets by mouth Daily for 180 days. Indications: Supraventricular Tachycardia, Disp: 45 tablet, Rfl: 5    rivastigmine (EXELON) 6 MG capsule, Take 1 capsule by mouth 2 (Two) Times a Day. Indications: Alzheimer's Disease, Disp: , Rfl:     sodium bicarbonate 650 MG tablet, Take 1 tablet by mouth 3 (Three) Times a Day., Disp: , Rfl:     melatonin 1 MG tablet, Take 1 tablet by mouth At Night As Needed for Sleep. Indications: Trouble Sleeping (Patient not taking:  Reported on 1/30/2025), Disp: , Rfl:      Assessment:        Patient Active Problem List   Diagnosis    SHARON (acute kidney injury)    Acute renal failure               Plan:   pSVT: holter benign, asymptomatic. Continue metoprolol    2. Hypertension: 133/69, controlled, continue metoprolol, amlodipine    Importance of controlling hypertension and blood pressure  checkup on the regular basis has been explained. Hypertension as a silent killer has been discussed. Risk reduction of the weight and regular exercises to control the hypertension has been explained.               No diagnosis found.    There are no diagnoses linked to this encounter.    COUNSELING: done Anita FrankelCounseling was given to patient for the following topics: diagnostic results, risk factor reductions, impressions, risks and benefits of treatment options and importance of treatment compliance .       SMOKING COUNSELING: denies    Follow up in 1 year or sooner if needed    Sincerely,   GLENN Santizo  [facility]  01/30/25  14:09 EST    EMR Dragon/Transcription disclaimer:   Much of this encounter note is an electronic transcription/translation of spoken language to printed text. The electronic translation of spoken language may permit erroneous, or at times, nonsensical words or phrases to be inadvertently transcribed; Although I have reviewed the note for such errors, some may still exist.

## 2025-04-21 RX ORDER — METOPROLOL SUCCINATE 25 MG/1
TABLET, EXTENDED RELEASE ORAL
Qty: 45 TABLET | Refills: 8 | Status: SHIPPED | OUTPATIENT
Start: 2025-04-21

## (undated) DEVICE — SYR LUERLOK 20CC BX/50

## (undated) DEVICE — Device

## (undated) DEVICE — ANTIBACTERIAL UNDYED BRAIDED (POLYGLACTIN 910), SYNTHETIC ABSORBABLE SUTURE: Brand: COATED VICRYL

## (undated) DEVICE — BIOPATCH™ ANTIMICROBIAL DRESSING WITH CHLORHEXIDINE GLUCONATE IS A HYDROPHILLIC POLYURETHANE ABSORPTIVE FOAM WITH CHLORHEXIDINE GLUCONATE (CHG) WHICH INHIBITS BACTERIAL GROWTH UNDER THE DRESSING. THE DRESSING IS INTENDED TO BE USED TO ABSORB EXUDATE, COVER A WOUND CAUSED BY VASCULAR AND NONVASCULAR PERCUTANEOUS MEDICAL DEVICES DURING SURGERY, AS WELL AS REDUCE LOCAL INFECTION AND COLONIZATION OF MICROORGANISMS.: Brand: BIOPATCH

## (undated) DEVICE — DECANTER BAG 9": Brand: MEDLINE INDUSTRIES, INC.

## (undated) DEVICE — SUT ETHLN 2/0 PS 18IN 585H

## (undated) DEVICE — LOU MINOR PROCEDURE: Brand: MEDLINE INDUSTRIES, INC.

## (undated) DEVICE — SYR LL TP 10ML STRL

## (undated) DEVICE — ST. SORBAVIEW ULTIMATE IJ SYSTEM A,C: Brand: CENTURION

## (undated) DEVICE — NDL HYPO PRECISIONGLIDE REG 25G 1 1/2

## (undated) DEVICE — CVR PROB 96IN LF STRL

## (undated) DEVICE — ST ACC MICROPUNCTURE STFF .018 ECHO/PLAT/TP 4F/10CM 21G/7CM

## (undated) DEVICE — GLV SURG BIOGEL LTX PF 7 1/2

## (undated) DEVICE — SYR LUERLOK 5CC

## (undated) DEVICE — ADHS SKIN SURG TISS VISC PREMIERPRO EXOFIN HI/VISC FAST/DRY